# Patient Record
Sex: FEMALE | Race: WHITE | NOT HISPANIC OR LATINO | Employment: FULL TIME | URBAN - METROPOLITAN AREA
[De-identification: names, ages, dates, MRNs, and addresses within clinical notes are randomized per-mention and may not be internally consistent; named-entity substitution may affect disease eponyms.]

---

## 2017-09-21 ENCOUNTER — OFFICE VISIT (OUTPATIENT)
Dept: OBSTETRICS AND GYNECOLOGY | Facility: CLINIC | Age: 27
End: 2017-09-21
Attending: OBSTETRICS & GYNECOLOGY
Payer: COMMERCIAL

## 2017-09-21 VITALS
DIASTOLIC BLOOD PRESSURE: 80 MMHG | BODY MASS INDEX: 39.02 KG/M2 | SYSTOLIC BLOOD PRESSURE: 126 MMHG | HEIGHT: 61 IN | WEIGHT: 206.69 LBS

## 2017-09-21 DIAGNOSIS — Z12.4 ENCOUNTER FOR PAPANICOLAOU SMEAR FOR CERVICAL CANCER SCREENING: Primary | ICD-10-CM

## 2017-09-21 DIAGNOSIS — Z01.419 ENCOUNTER FOR GYNECOLOGICAL EXAMINATION (GENERAL) (ROUTINE) WITHOUT ABNORMAL FINDINGS: ICD-10-CM

## 2017-09-21 PROCEDURE — 88141 CYTOPATH C/V INTERPRET: CPT | Mod: ,,, | Performed by: PATHOLOGY

## 2017-09-21 PROCEDURE — 99999 PR PBB SHADOW E&M-EST. PATIENT-LVL III: CPT | Mod: PBBFAC,,, | Performed by: OBSTETRICS & GYNECOLOGY

## 2017-09-21 PROCEDURE — 88175 CYTOPATH C/V AUTO FLUID REDO: CPT | Performed by: PATHOLOGY

## 2017-09-21 PROCEDURE — 99395 PREV VISIT EST AGE 18-39: CPT | Mod: S$GLB,,, | Performed by: OBSTETRICS & GYNECOLOGY

## 2017-09-21 NOTE — PROGRESS NOTES
Subjective:       Patient ID: Rosa Limon is a 27 y.o. female.    Chief Complaint:  Annual Exam (last pap 2016 normal)      Patient Active Problem List   Diagnosis    Cerebral venous thrombosis    HA (headache)    Headache    Dural sinus thrombosis    Cephalalgia    Migraine aura, persistent, intractable, with status migrainosus    Major depressive disorder, recurrent episode, unspecified    Protracted URI    Encounter for therapeutic drug monitoring    Abnormal albumin    Cerebral venous sinus thrombosis    Long term (current) use of anticoagulants    Factor II deficiency       History of Present Illness  27 y.o. yo  here for annual exam. No gyn complaints. Doing well. No periods with Mirena. Previous CVA on Nuvaring, dx with Factor II deficiency. Mirena placed in       Past Medical History:   Diagnosis Date    Abnormal Pap smear of cervix 2015    (+) Low Grade Squamous Intraepithelial Lesion     Depression     Factor II deficiency     CVA on Nuvaring    Insomnia     Stroke     CVA on Nuvaring. Factor II deficiency        Past Surgical History:   Procedure Laterality Date    FRACTURE SURGERY      ORIF wrist left Left 2015    TONSILLECTOMY         OB History    Para Term  AB Living   0 0 0 0 0 0   SAB TAB Ectopic Multiple Live Births   0 0 0 0               No LMP recorded. Patient is not currently having periods (Reason: Birth Control).   Date of Last Pap: 2016    Review of Systems  Review of Systems   Constitutional: Negative for fatigue and unexpected weight change.   Respiratory: Negative for shortness of breath.    Cardiovascular: Negative for chest pain.   Gastrointestinal: Negative for abdominal pain, constipation, diarrhea, nausea and vomiting.   Genitourinary: Negative for dysuria.   Musculoskeletal: Negative for back pain.   Skin: Negative for rash.   Neurological: Negative for headaches.   Hematological: Does not bruise/bleed  easily.   Psychiatric/Behavioral: Negative for behavioral problems.        Objective:   Physical Exam:   Constitutional: She is oriented to person, place, and time. Vital signs are normal. She appears well-developed and well-nourished. No distress.        Pulmonary/Chest: She exhibits no mass. Right breast exhibits no mass, no nipple discharge, no skin change, no tenderness, no bleeding and no swelling. Left breast exhibits no mass, no nipple discharge, no skin change, no tenderness, no bleeding and no swelling. Breasts are symmetrical.        Abdominal: Soft. Normal appearance and bowel sounds are normal. She exhibits no distension and no mass. There is no tenderness. There is no rebound.     Genitourinary: Vagina normal and uterus normal. There is no rash, tenderness, lesion or injury on the right labia. There is no rash, tenderness, lesion or injury on the left labia. Uterus is not deviated, not enlarged, not fixed, not tender, not hosting fibroids and not experiencing uterine prolapse. Cervix is normal. Right adnexum displays no mass, no tenderness and no fullness. Left adnexum displays no mass, no tenderness and no fullness. No erythema, tenderness, rectocele, cystocele or unspecified prolapse of vaginal walls in the vagina. No vaginal discharge found. Cervix exhibits no motion tenderness, no discharge and no friability.           Musculoskeletal: Normal range of motion and moves all extremeties.      Lymphadenopathy:     She has no axillary adenopathy.        Right: No supraclavicular adenopathy present.        Left: No supraclavicular adenopathy present.    Neurological: She is alert and oriented to person, place, and time.    Skin: Skin is warm and dry.    Psychiatric: She has a normal mood and affect. Her behavior is normal. Judgment normal.        Assessment/ Plan:     1. Encounter for Papanicolaou smear for cervical cancer screening  Liquid-based pap smear, screening   2. Encounter for gynecological  examination (general) (routine) without abnormal findings       Strings not seen on exam, unofficial U/S done which confirmed Mirena is in place in uterus.   Follow-up with me in 1 year

## 2017-09-29 ENCOUNTER — TELEPHONE (OUTPATIENT)
Dept: OBSTETRICS AND GYNECOLOGY | Facility: CLINIC | Age: 27
End: 2017-09-29

## 2017-09-29 NOTE — TELEPHONE ENCOUNTER
----- Message from Radha Nino MD sent at 9/29/2017  2:28 PM CDT -----  I called pt. She did not answer. I left msg-- detailed, explaining results, and sent portal msg that she needs colpo. Call pt to schedule colpo

## 2017-10-03 ENCOUNTER — LAB VISIT (OUTPATIENT)
Dept: LAB | Facility: HOSPITAL | Age: 27
End: 2017-10-03
Attending: INTERNAL MEDICINE
Payer: COMMERCIAL

## 2017-10-03 ENCOUNTER — OFFICE VISIT (OUTPATIENT)
Dept: INTERNAL MEDICINE | Facility: CLINIC | Age: 27
End: 2017-10-03
Payer: COMMERCIAL

## 2017-10-03 VITALS
SYSTOLIC BLOOD PRESSURE: 126 MMHG | WEIGHT: 205.5 LBS | DIASTOLIC BLOOD PRESSURE: 98 MMHG | HEART RATE: 120 BPM | HEIGHT: 61 IN | BODY MASS INDEX: 38.8 KG/M2

## 2017-10-03 DIAGNOSIS — M79.661 BILATERAL CALF PAIN: Primary | ICD-10-CM

## 2017-10-03 DIAGNOSIS — M79.661 BILATERAL CALF PAIN: ICD-10-CM

## 2017-10-03 DIAGNOSIS — D68.52 PROTHROMBIN G20210A MUTATION: ICD-10-CM

## 2017-10-03 DIAGNOSIS — E55.9 VITAMIN D DEFICIENCY: ICD-10-CM

## 2017-10-03 DIAGNOSIS — M79.662 BILATERAL CALF PAIN: ICD-10-CM

## 2017-10-03 DIAGNOSIS — M79.662 BILATERAL CALF PAIN: Primary | ICD-10-CM

## 2017-10-03 LAB
25(OH)D3+25(OH)D2 SERPL-MCNC: 22 NG/ML
ALBUMIN SERPL BCP-MCNC: 3.5 G/DL
ALP SERPL-CCNC: 122 U/L
ALT SERPL W/O P-5'-P-CCNC: 19 U/L
ANION GAP SERPL CALC-SCNC: 10 MMOL/L
AST SERPL-CCNC: 17 U/L
BASOPHILS # BLD AUTO: 0.05 K/UL
BASOPHILS NFR BLD: 0.5 %
BILIRUB SERPL-MCNC: 0.7 MG/DL
BUN SERPL-MCNC: 9 MG/DL
CALCIUM SERPL-MCNC: 9.3 MG/DL
CHLORIDE SERPL-SCNC: 104 MMOL/L
CO2 SERPL-SCNC: 22 MMOL/L
CREAT SERPL-MCNC: 0.7 MG/DL
DIFFERENTIAL METHOD: ABNORMAL
EOSINOPHIL # BLD AUTO: 0.2 K/UL
EOSINOPHIL NFR BLD: 1.7 %
ERYTHROCYTE [DISTWIDTH] IN BLOOD BY AUTOMATED COUNT: 13.9 %
EST. GFR  (AFRICAN AMERICAN): >60 ML/MIN/1.73 M^2
EST. GFR  (NON AFRICAN AMERICAN): >60 ML/MIN/1.73 M^2
GLUCOSE SERPL-MCNC: 92 MG/DL
HCT VFR BLD AUTO: 42.1 %
HGB BLD-MCNC: 14 G/DL
INR PPP: 0.8
LYMPHOCYTES # BLD AUTO: 3 K/UL
LYMPHOCYTES NFR BLD: 30.1 %
MAGNESIUM SERPL-MCNC: 2.2 MG/DL
MCH RBC QN AUTO: 28.7 PG
MCHC RBC AUTO-ENTMCNC: 33.3 G/DL
MCV RBC AUTO: 86 FL
MONOCYTES # BLD AUTO: 0.7 K/UL
MONOCYTES NFR BLD: 7 %
NEUTROPHILS # BLD AUTO: 6 K/UL
NEUTROPHILS NFR BLD: 60.4 %
PLATELET # BLD AUTO: 370 K/UL
PMV BLD AUTO: 9.4 FL
POTASSIUM SERPL-SCNC: 4.1 MMOL/L
PROT SERPL-MCNC: 7.5 G/DL
PROTHROMBIN TIME: 9.4 SEC
RBC # BLD AUTO: 4.88 M/UL
SODIUM SERPL-SCNC: 136 MMOL/L
TSH SERPL DL<=0.005 MIU/L-ACNC: 1.14 UIU/ML
WBC # BLD AUTO: 9.93 K/UL

## 2017-10-03 PROCEDURE — 99214 OFFICE O/P EST MOD 30 MIN: CPT | Mod: S$GLB,,, | Performed by: INTERNAL MEDICINE

## 2017-10-03 PROCEDURE — 85025 COMPLETE CBC W/AUTO DIFF WBC: CPT

## 2017-10-03 PROCEDURE — 80053 COMPREHEN METABOLIC PANEL: CPT

## 2017-10-03 PROCEDURE — 84443 ASSAY THYROID STIM HORMONE: CPT

## 2017-10-03 PROCEDURE — 36415 COLL VENOUS BLD VENIPUNCTURE: CPT

## 2017-10-03 PROCEDURE — 99999 PR PBB SHADOW E&M-EST. PATIENT-LVL III: CPT | Mod: PBBFAC,,, | Performed by: INTERNAL MEDICINE

## 2017-10-03 PROCEDURE — 83735 ASSAY OF MAGNESIUM: CPT

## 2017-10-03 PROCEDURE — 82306 VITAMIN D 25 HYDROXY: CPT

## 2017-10-03 PROCEDURE — 85610 PROTHROMBIN TIME: CPT

## 2017-10-03 NOTE — PROGRESS NOTES
"Subjective:       Patient ID: Rosa Limon is a 27 y.o. female.    Chief Complaint: Leg Pain (Calves)    HPI   27-year-old female previously seen by me in 2015 with history of sagittal sinus thrombosis found to have prothrombin mutation anticoagulated on Coumadin (May-2015-Jan 2016, completed course per hematology) with presents for urgent eval of leg pain.    Walking causes her calves to cramp. Tried drinking lots of water and MVI without relief. Whole calf bilaterally cramps with short distances (often in store.)     Previous PCP Dr. Sammy Patino. Hasn't been seeing anyone for primary care.   wellbutrin  Duloxetine  Vit d  mirena      Review of Systems   Constitutional: Negative for fever.   Respiratory: Negative for shortness of breath.    Cardiovascular: Negative for chest pain.        Fast heart rate currently when nervous, reports has been normal at home   Musculoskeletal:        Calf pain with walking.      Skin: Negative.        Objective:   BP (!) 126/98 (BP Location: Right arm, Patient Position: Sitting, BP Method: Medium (Manual))   Pulse (!) 120   Ht 5' 1" (1.549 m)   Wt 93.2 kg (205 lb 7.5 oz)   BMI 38.82 kg/m²      Physical Exam   Constitutional: She is oriented to person, place, and time. She appears well-developed and well-nourished. No distress.   HENT:   Head: Normocephalic and atraumatic.   Cardiovascular: Normal rate and regular rhythm.    Pulmonary/Chest: Effort normal. No respiratory distress. She has no wheezes. She has no rales.   Musculoskeletal:   Somewhat tight musculature of calves bilaterally.  No asymmetry appreciated.  Palpable pulses bilateral lower extremities.  Left foot has several healing scars she states are mosquito bites   Neurological: She is alert and oriented to person, place, and time.   Skin: Skin is warm and dry. She is not diaphoretic.   Psychiatric: She has a normal mood and affect. Her behavior is normal.       Assessment:       1. Bilateral calf pain  "   2. 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 Vitamin D deficiency        Plan:       Rosa was seen today for leg pain.    Diagnoses and all orders for this visit:    Bilateral calf pain with exertion  Prothrombin [id number] mutation  -     Cardiology Lab Ankle Brachial Indices W Stress; Future  -     US Lower Extremity Veins Bilateral; Future  -     CBC auto differential; Future  -     Comprehensive metabolic panel; Future  -     TSH; Future  -     Magnesium; Future  -     Protime-INR; Future    Vitamin D deficiency  -     Vitamin D; Future   Taking 50 k weekly sporadically, rx by psyc    Tachycardia  Reports is nervous, has been normal range at home

## 2017-10-06 ENCOUNTER — HOSPITAL ENCOUNTER (OUTPATIENT)
Dept: RADIOLOGY | Facility: HOSPITAL | Age: 27
Discharge: HOME | End: 2017-10-06
Attending: INTERNAL MEDICINE
Payer: COMMERCIAL

## 2017-10-06 DIAGNOSIS — M79.661 BILATERAL CALF PAIN: ICD-10-CM

## 2017-10-06 DIAGNOSIS — M79.662 BILATERAL CALF PAIN: ICD-10-CM

## 2017-10-06 DIAGNOSIS — D68.52 PROTHROMBIN G20210A MUTATION: ICD-10-CM

## 2017-10-06 PROCEDURE — 93970 EXTREMITY STUDY: CPT | Mod: TC

## 2017-10-06 PROCEDURE — 93970 EXTREMITY STUDY: CPT | Mod: 26,,, | Performed by: RADIOLOGY

## 2017-10-09 ENCOUNTER — CLINICAL SUPPORT (OUTPATIENT)
Dept: CARDIOLOGY | Facility: CLINIC | Age: 27
End: 2017-10-09
Payer: COMMERCIAL

## 2017-10-09 DIAGNOSIS — M79.662 BILATERAL CALF PAIN: ICD-10-CM

## 2017-10-09 DIAGNOSIS — D68.52 PROTHROMBIN G20210A MUTATION: ICD-10-CM

## 2017-10-09 DIAGNOSIS — M79.661 BILATERAL CALF PAIN: ICD-10-CM

## 2017-10-09 LAB — VASCULAR ANKLE BRACHIAL INDEX (ABI) RIGHT: 1.1 (ref 0.9–1.2)

## 2017-10-09 PROCEDURE — 93924 LWR XTR VASC STDY BILAT: CPT | Mod: S$GLB,,, | Performed by: INTERNAL MEDICINE

## 2017-10-12 ENCOUNTER — PROCEDURE VISIT (OUTPATIENT)
Dept: OBSTETRICS AND GYNECOLOGY | Facility: CLINIC | Age: 27
End: 2017-10-12
Attending: OBSTETRICS & GYNECOLOGY
Payer: COMMERCIAL

## 2017-10-12 VITALS
SYSTOLIC BLOOD PRESSURE: 110 MMHG | HEIGHT: 61 IN | DIASTOLIC BLOOD PRESSURE: 64 MMHG | WEIGHT: 202.5 LBS | BODY MASS INDEX: 38.23 KG/M2

## 2017-10-12 DIAGNOSIS — R87.611 PAP SMEAR OF CERVIX WITH ASCUS, CANNOT EXCLUDE HGSIL: Primary | ICD-10-CM

## 2017-10-12 PROCEDURE — 88305 TISSUE EXAM BY PATHOLOGIST: CPT | Performed by: PATHOLOGY

## 2017-10-12 PROCEDURE — 88305 TISSUE EXAM BY PATHOLOGIST: CPT | Mod: 26,,, | Performed by: PATHOLOGY

## 2017-10-12 PROCEDURE — 57454 BX/CURETT OF CERVIX W/SCOPE: CPT | Mod: S$GLB,,, | Performed by: OBSTETRICS & GYNECOLOGY

## 2017-10-12 NOTE — PROCEDURES
"Procedures     COLPOSCOPY:    Rosa Limon is a 27 y.o. female   presents for colposcopy.  No LMP recorded (lmp unknown). Patient is not currently having periods (Reason: Birth Control)..  Her most recent pap smear shows ASC cannot exclude high grade lesion (ASCH).      The abnormal test results were discussed.  We also discussed HPV infection and its association with abnormal Pap tests and cervical cancer.  The risks and benefits of colposcopy were reviewed.  She agrees to proceed.      UPT is negative    Vitals:    10/12/17 1107   BP: 110/64   Weight: 91.8 kg (202 lb 7.9 oz)   Height: 5' 1" (1.549 m)   PainSc: 0-No pain       COLPOSCOPY EXAM:   TIME OUT PERFORMED.     no visible lesions, no mosaicism, no punctation and no abnormal vasculature    Biopsy was taken at 12 o'clock. Even though no AWFL. I took one biopsy because of favor high grade. ECC was performed    Hemostasis was adequate with application of Monsel's solution.  The speculum was removed.  The patient did tolerate the procedure well.    All collected specimens sent to pathology for histologic analysis.    Pap smear of cervix with ASCUS, cannot exclude HGSIL  -     Tissue Specimen To Pathology, Obstetrics/Gynecology  -     Tissue Specimen To Pathology, Obstetrics/Gynecology        Post-colposcopy counseling:  For cramping take NSAIDs, Tylenol, or a prescription pain medication per the medication card.    Avoid intercourse, douching, or tampons in the vagina for at least 7 days.   Expect a clumpy brown, orange, yellow, or black discharge due to Monsel's solution application for several days.   Call the office for heavy bleeding, significant pain, or a  foul-smelling vaginal discharge.  The HPV vaccine was  recommended according to FDA age guidelines.  The importance of keeping follow-up appointments was discussed.    Final plan and follow up based on colposcopy results.    "

## 2017-10-17 DIAGNOSIS — R79.89 HIGH PLATELET COUNT: Primary | ICD-10-CM

## 2017-10-17 DIAGNOSIS — E55.9 VITAMIN D INSUFFICIENCY: ICD-10-CM

## 2017-10-23 ENCOUNTER — PATIENT MESSAGE (OUTPATIENT)
Dept: INTERNAL MEDICINE | Facility: CLINIC | Age: 27
End: 2017-10-23

## 2017-10-24 ENCOUNTER — PATIENT MESSAGE (OUTPATIENT)
Dept: INTERNAL MEDICINE | Facility: CLINIC | Age: 27
End: 2017-10-24

## 2017-10-24 DIAGNOSIS — D68.52 PROTHROMBIN G20210A MUTATION: ICD-10-CM

## 2017-10-24 DIAGNOSIS — D68.2 FACTOR II DEFICIENCY: Primary | ICD-10-CM

## 2017-10-24 DIAGNOSIS — R52 PAIN AGGRAVATED BY WALKING: ICD-10-CM

## 2017-11-01 ENCOUNTER — PATIENT MESSAGE (OUTPATIENT)
Dept: INTERNAL MEDICINE | Facility: CLINIC | Age: 27
End: 2017-11-01

## 2017-11-01 DIAGNOSIS — Z00.00 HEALTH CARE MAINTENANCE: Primary | ICD-10-CM

## 2017-11-03 ENCOUNTER — TELEPHONE (OUTPATIENT)
Dept: OBSTETRICS AND GYNECOLOGY | Facility: CLINIC | Age: 27
End: 2017-11-03

## 2017-11-16 ENCOUNTER — HOSPITAL ENCOUNTER (EMERGENCY)
Facility: HOSPITAL | Age: 27
Discharge: HOME | End: 2017-11-16
Attending: EMERGENCY MEDICINE
Payer: COMMERCIAL

## 2017-11-16 VITALS
HEIGHT: 61 IN | SYSTOLIC BLOOD PRESSURE: 135 MMHG | OXYGEN SATURATION: 100 % | DIASTOLIC BLOOD PRESSURE: 80 MMHG | TEMPERATURE: 99 F | RESPIRATION RATE: 20 BRPM | BODY MASS INDEX: 37.76 KG/M2 | HEART RATE: 75 BPM | WEIGHT: 200 LBS

## 2017-11-16 DIAGNOSIS — R45.851 SUICIDAL THOUGHTS: ICD-10-CM

## 2017-11-16 DIAGNOSIS — D68.2 FACTOR II DEFICIENCY: ICD-10-CM

## 2017-11-16 DIAGNOSIS — F32.A DEPRESSION, UNSPECIFIED DEPRESSION TYPE: Primary | ICD-10-CM

## 2017-11-16 DIAGNOSIS — G08 CEREBRAL VENOUS SINUS THROMBOSIS: ICD-10-CM

## 2017-11-16 LAB
ALBUMIN SERPL BCP-MCNC: 3.7 G/DL
ALP SERPL-CCNC: 120 U/L
ALT SERPL W/O P-5'-P-CCNC: 23 U/L
AMPHET+METHAMPHET UR QL: NEGATIVE
ANION GAP SERPL CALC-SCNC: 15 MMOL/L
APAP SERPL-MCNC: <3 UG/ML
AST SERPL-CCNC: 21 U/L
B-HCG UR QL: NEGATIVE
BARBITURATES UR QL SCN>200 NG/ML: NEGATIVE
BASOPHILS # BLD AUTO: 0.03 K/UL
BASOPHILS NFR BLD: 0.4 %
BENZODIAZ UR QL SCN>200 NG/ML: NEGATIVE
BILIRUB SERPL-MCNC: 0.2 MG/DL
BILIRUB UR QL STRIP: NEGATIVE
BUN SERPL-MCNC: 7 MG/DL
BZE UR QL SCN: NEGATIVE
CALCIUM SERPL-MCNC: 8.7 MG/DL
CANNABINOIDS UR QL SCN: NEGATIVE
CHLORIDE SERPL-SCNC: 110 MMOL/L
CLARITY UR: ABNORMAL
CO2 SERPL-SCNC: 17 MMOL/L
COLOR UR: YELLOW
CORTIS SERPL-MCNC: 53.1 UG/DL
CREAT SERPL-MCNC: 0.7 MG/DL
CREAT UR-MCNC: 145.9 MG/DL
CTP QC/QA: YES
DIFFERENTIAL METHOD: ABNORMAL
EOSINOPHIL # BLD AUTO: 0.1 K/UL
EOSINOPHIL NFR BLD: 0.7 %
ERYTHROCYTE [DISTWIDTH] IN BLOOD BY AUTOMATED COUNT: 13.6 %
EST. GFR  (AFRICAN AMERICAN): >60 ML/MIN/1.73 M^2
EST. GFR  (NON AFRICAN AMERICAN): >60 ML/MIN/1.73 M^2
ETHANOL SERPL-MCNC: 130 MG/DL
GLUCOSE SERPL-MCNC: 93 MG/DL
GLUCOSE UR QL STRIP: NEGATIVE
HCT VFR BLD AUTO: 42.8 %
HGB BLD-MCNC: 14.4 G/DL
HGB UR QL STRIP: NEGATIVE
KETONES UR QL STRIP: NEGATIVE
LEUKOCYTE ESTERASE UR QL STRIP: NEGATIVE
LYMPHOCYTES # BLD AUTO: 2.5 K/UL
LYMPHOCYTES NFR BLD: 33.8 %
MCH RBC QN AUTO: 29.5 PG
MCHC RBC AUTO-ENTMCNC: 33.6 G/DL
MCV RBC AUTO: 88 FL
METHADONE UR QL SCN>300 NG/ML: NEGATIVE
MONOCYTES # BLD AUTO: 0.3 K/UL
MONOCYTES NFR BLD: 4.1 %
NEUTROPHILS # BLD AUTO: 4.6 K/UL
NEUTROPHILS NFR BLD: 60.6 %
NITRITE UR QL STRIP: NEGATIVE
OPIATES UR QL SCN: NEGATIVE
PCP UR QL SCN>25 NG/ML: NEGATIVE
PH UR STRIP: 6 [PH] (ref 5–8)
PLATELET # BLD AUTO: 319 K/UL
PMV BLD AUTO: 9.1 FL
POTASSIUM SERPL-SCNC: 4.8 MMOL/L
PROT SERPL-MCNC: 7.9 G/DL
PROT UR QL STRIP: NEGATIVE
RBC # BLD AUTO: 4.88 M/UL
SODIUM SERPL-SCNC: 142 MMOL/L
SP GR UR STRIP: >=1.03 (ref 1–1.03)
TOXICOLOGY INFORMATION: NORMAL
TSH SERPL DL<=0.005 MIU/L-ACNC: 1.08 UIU/ML
URN SPEC COLLECT METH UR: ABNORMAL
UROBILINOGEN UR STRIP-ACNC: NEGATIVE EU/DL
WBC # BLD AUTO: 7.52 K/UL

## 2017-11-16 PROCEDURE — 81025 URINE PREGNANCY TEST: CPT

## 2017-11-16 PROCEDURE — 80307 DRUG TEST PRSMV CHEM ANLYZR: CPT

## 2017-11-16 PROCEDURE — 80329 ANALGESICS NON-OPIOID 1 OR 2: CPT

## 2017-11-16 PROCEDURE — 99285 EMERGENCY DEPT VISIT HI MDM: CPT | Mod: 25

## 2017-11-16 PROCEDURE — 85025 COMPLETE CBC W/AUTO DIFF WBC: CPT

## 2017-11-16 PROCEDURE — 82533 TOTAL CORTISOL: CPT

## 2017-11-16 PROCEDURE — 80053 COMPREHEN METABOLIC PANEL: CPT

## 2017-11-16 PROCEDURE — 84443 ASSAY THYROID STIM HORMONE: CPT

## 2017-11-16 PROCEDURE — 80320 DRUG SCREEN QUANTALCOHOLS: CPT

## 2017-11-16 PROCEDURE — 81003 URINALYSIS AUTO W/O SCOPE: CPT

## 2017-11-16 RX ORDER — CLONAZEPAM 0.5 MG/1
0.5 TABLET ORAL 3 TIMES DAILY PRN
Qty: 5 TABLET | Refills: 0 | Status: SHIPPED | OUTPATIENT
Start: 2017-11-16 | End: 2017-11-29

## 2017-11-16 RX ORDER — BUPROPION HYDROCHLORIDE 200 MG/1
400 TABLET, EXTENDED RELEASE ORAL DAILY
Qty: 60 TABLET | Refills: 0 | Status: SHIPPED | OUTPATIENT
Start: 2017-11-16 | End: 2017-12-16

## 2017-11-16 RX ORDER — DULOXETIN HYDROCHLORIDE 60 MG/1
60 CAPSULE, DELAYED RELEASE ORAL 2 TIMES DAILY
Qty: 60 CAPSULE | Refills: 0 | Status: SHIPPED | OUTPATIENT
Start: 2017-11-16 | End: 2018-01-03 | Stop reason: SDUPTHER

## 2017-11-16 NOTE — ED PROVIDER NOTES
Encounter Date: 11/16/2017       History     Chief Complaint   Patient presents with    Suicidal     told boyfriend over the phone that she wanted to kill herself with plans to hang herself     27-year-old female with insomnia, CVA, factor II deficiency and depression with previous hospitalization and suicide attempts presents to the ED with worsening depression for the past some time.  She states that she recently had a breakup with her boyfriend and that since this time has been experiencing more depression.  She states that she has dealt with depression for some time and that previous suicide attempt was 2 years ago.  She does state that she is followed by psychiatrist and had an appointment yesterday however did not relay her increased depression to her.  She does report suicide ideation and a plan.  Per EMS, patient called boyfriend and told him that she had a plan to hang herself.  She denies any attempts today or recently.  She does report compliance with Cymbalta and Wellbutrin.      The history is provided by the patient.     Review of patient's allergies indicates:  No Known Allergies  Past Medical History:   Diagnosis Date    Abnormal Pap smear of cervix 09/14/2015    (+) Low Grade Squamous Intraepithelial Lesion     ASCUS with positive high risk HPV cervical 09/21/2017    Depression     Factor II deficiency     CVA on Nuvaring    Insomnia     Stroke     CVA on Nuvaring. Factor II deficiency      Past Surgical History:   Procedure Laterality Date    COLPOSCOPY  10/12/2017    FRACTURE SURGERY      ORIF wrist left Left 5/1/2015    TONSILLECTOMY       Family History   Problem Relation Age of Onset    Hypertension Maternal Grandmother     Diabetes Maternal Grandfather     Diabetes Paternal Uncle     Cancer Neg Hx     Heart disease Neg Hx     Stroke Neg Hx      Social History   Substance Use Topics    Smoking status: Never Smoker    Smokeless tobacco: Never Used    Alcohol use Yes       Comment: social     Review of Systems   Constitutional: Negative for activity change, appetite change, chills and fever.   HENT: Negative for congestion and sore throat.    Respiratory: Negative for cough and shortness of breath.    Cardiovascular: Negative for chest pain.   Gastrointestinal: Negative for nausea and vomiting.   Genitourinary: Negative for dysuria.   Musculoskeletal: Negative for back pain.   Skin: Negative for rash.   Neurological: Negative for dizziness, weakness, light-headedness and headaches.   Hematological: Does not bruise/bleed easily.   Psychiatric/Behavioral: Positive for dysphoric mood and suicidal ideas. Negative for self-injury.       Physical Exam     Initial Vitals [11/16/17 0940]   BP Pulse Resp Temp SpO2   128/86 (!) 120 (!) 22 98.7 °F (37.1 °C) 100 %      MAP       100         Physical Exam    Nursing note and vitals reviewed.  Constitutional: Vital signs are normal. She appears well-developed and well-nourished. She is cooperative.  Non-toxic appearance. She does not appear ill. No distress.   HENT:   Head: Normocephalic and atraumatic.   Eyes: Conjunctivae and lids are normal.   Neck: Neck supple. No neck rigidity.   Cardiovascular: Normal rate and regular rhythm.   Pulmonary/Chest: Breath sounds normal. No respiratory distress. She has no wheezes. She has no rhonchi.   Abdominal: Soft. Normal appearance and bowel sounds are normal. There is no tenderness. There is no rigidity and no guarding.   Musculoskeletal: Normal range of motion.   Neurological: She is alert and oriented to person, place, and time. She has normal strength. GCS eye subscore is 4. GCS verbal subscore is 5. GCS motor subscore is 6.   Skin: Skin is warm, dry and intact. No rash noted.   Psychiatric: Her speech is normal and behavior is normal. She exhibits a depressed mood. She expresses suicidal ideation. She expresses no homicidal ideation. She expresses suicidal plans.         ED Course   Procedures  Labs  Reviewed   CBC W/ AUTO DIFFERENTIAL   COMPREHENSIVE METABOLIC PANEL   TSH   URINALYSIS   DRUG SCREEN PANEL, URINE EMERGENCY   ALCOHOL,MEDICAL (ETHANOL)   ACETAMINOPHEN LEVEL   POCT URINE PREGNANCY             Medical Decision Making:   Initial Assessment:   19-year-old female with history of depression currently followed by psychiatrists and on Wellbutrin and Cymbalta presents to the ED with suicidal ideation and plan of suicide.  She does work as a  and states that she has a good support system.  That she has been more depressed since a recent breakup.  Per EMS, she did call her boyfriend and informed him that she had a plan.  She denies any attempt today or recently.  Physical exam reveals tearful patient expresses hopelessness, suicidal ideation and plan.  She does exhibit insight and does have a good support system and understanding of depression as she herself is a .  She denies any HI.  She does not appear to have any delusions or hallucinations or lesions.  No other significant findings at this time  Differential Diagnosis:   Suicidal ideation, plan of suicide, depression, UTI, acute intracranial abnormality  Clinical Tests:   Lab Tests: Ordered and Reviewed  Radiological Study: Ordered and Reviewed  Medical Tests: Ordered and Reviewed  ED Management:  Initial assessment by myself.  PEC was ordered until evaluation by psychiatrist.  The psychiatrist evaluate the patient in the emergency room and feels that a PEC is not necessary at this time and the patient is not a threat to herself at this time.  Recommendations are that patient should be discharged after cleared from a medical standpoint.  He will send patient home with increased Wellbutrin and Cymbalta.  Patient will be sent home was short course of Klonopin and instruct the patient to follow up with psychiatrist tomorrow. Patient was cautioned on when to return to ED. Patient verbalized understanding and agreement with the  treatment plan                     ED Course      Clinical Impression:   The primary encounter diagnosis was Depression, unspecified depression type. Diagnoses of Factor II deficiency, Suicidal thoughts, and Cerebral venous sinus thrombosis were also pertinent to this visit.                           SHERRY Hamilton  11/16/17 7634

## 2017-11-16 NOTE — CONSULTS
Discharge this patient to [facility] when medically stable  Increase Cymbalta to 60 mg po bid.  Increase Wellbutrin 200 mg po bid.  Klonopin 0.5 mg po prn q 12

## 2017-11-16 NOTE — ED NOTES
APPEARANCE: Alert, oriented and in no acute distress.  PERIPHERAL VASCULAR: peripheral pulses present. Normal cap refill. No edema. Warm to touch.    RESPIRATORY:Normal rate and effort, breath sounds clear bilaterally throughout chest. Respirations are equal and unlabored no obvious signs of distress.  GASTRO: soft, bowel sounds normal, no tenderness, no abdominal distention.  MUSC: Full ROM. No bony tenderness or soft tissue tenderness. No obvious deformity.  SKIN: Skin is warm and dry, normal skin turgor, mucous membranes moist.  EYE: PERRL, both eyes: pupils brisk and reactive to light. Normal size.  ENT: EARS: no obvious drainage. NOSE: no active bleeding.

## 2017-11-17 NOTE — PSYCH
"IDENTIFICATION DATA:  This is a 27-year-old single white female who was brought   in by EMS at the request of ex-boyfriend.  This consult is requested by Dr. Lane for psych evaluation.  The patient is not on a PEC status.    CHIEF COMPLAINT:  "My ex-boyfriend called because I was making suicidal   statements."    HISTORY OF PRESENT ILLNESS:  The patient states that she broke up with her   boyfriend last month and she started talking with another new boyfriend who   found her depressed and states that her depression is too much and she needs   help.  The patient states that she has been depressed since teenage.  She states   that she was seen by psychologist when she was 13.  Her depression was off and   on.  She has noted increase in depression for the last month.  She is depressed,   sad, tearful and getting suicidal thoughts for the last two days.  She broke up   with new boyfriend today, and she told that she wanted to kill herself.  She   had no plans.  The patient has a strong family history of depression.  The   patient's mom, brother, dad are all on antidepressants, and they have depression    The patient states that she is compliant with her Cymbalta and Wellbutrin   combination which works.  She was on Zoloft, Paxil, and Prozac in the past.  She   was drinking a bottle full of wine every day, but she quit in April.  She drank   a bottle of wine last night.  She does not get withdrawals.  She was clean   since April.  The patient is now focusing at work and she is distracted at work.    She denies flashbacks, nightmares or obsessive-compulsive features or social   phobia.  She picks on her skin.  The patient does not believe that she gets   nervous easily.  She denies any major panic attacks; however, she stays anxious   and she believes that her depression and anxiety go side by side.  She has no   phobias.  She denies use of drugs.  She is happy with her current job.  She has   some financial strains.  " She gets along well with supportive family.  She broke   up with boyfriend today.  She knows that depression affects her relationship.    She was diagnosed with  sinus and had two clots in her brain.  She has no   seizures.  She is consistent with her Cymbalta and Wellbutrin combination.  She   is not on any benzos and nothing for anxiety.  She still has sores over her face   and legs.  She denies hearing voices or seeing things.    PAST PSYCHIATRIC HISTORY:  The patient has no previous inpatient psychiatric   treatment.  She has been seen by a psychologist and psychiatrist.  She is under   the care of   who prescribed Cymbalta 90 mg p.o. per day and Wellbutrin   300 mg per day.  She was on Prozac, Paxil and Zoloft in the past.  She was tried   on Zoloft 150 and it was changed.  She has no history of suicide attempt, but   had suicidal thoughts in the past.  The patient has no history of arrest, DWIs   or rehabilitation.    SOCIAL AND FAMILY HISTORY:  The patient was born and raised in [place].  She   described her childhood as good.  She was raised by her parents.  She is a    by profession.  She has a good job.  She lives by herself.  She   has supportive family.  Her brother, dad, and mom suffer from depression, but no   suicide, psychosis and addiction in family.    MEDICAL HISTORY:  The patient has a history of VSD.  She is not on any medicine.    She has no history of seizures.  She had a stroke two years ago.  She is on   Cymbalta and Wellbutrin combination.  She is not allergic to any medicine or   food.  She has no endocrine workup in the past in spite of her cushingoid face.    Her blood pressure is 128/86, with 120 pulse and normal temperature.    MENTAL STATUS EXAMINATION:  This is a 27-year-old, short-statured, obese with   cushingoid face and with very good eye contact.  She is alert, cooperative and   oriented to day, date, month and year.  Mood is depressed with sad affect.  She    has tearful eyes.  She has sores on her leg and few on neck.  She is attentive   and able to concentrate.  She never had manic episode before.  She is able to recall 3 objects   out of 3 immediately, 3 out of 3 after 5 minutes and events of the past.  She   denies hearing voices or seeing things.  No delusional content noted.  She has   thoughts of harm to self and had passive suicidal statement like she wanted to   kill herself with no plans.  She has no thoughts of harm to self at this time   and has no plans.  She is willing to increase her medicines.  Insight and   judgment are better.  The patient knows her impulsive statement because she was   hurting this morning.  She has good fund of knowledge.  She had impaired   judgment this morning when she thought that she has no hope.  The patient is now   saying that she has good job and has supportive family and she would go to her   parents.  The patient has good abstract thinking.  She does not want to go to   the hospital because it may affect her career.  She is of average intelligence   person.    PSYCHIATRIC DIAGNOSES:  AXIS I:  Major depressive disorder, recurrent without psychotic features;   anxiety disorder NOS; adjustment disorder with depressed mood and anxiety.  AXIS II:  No diagnosis.  AXIS III:  Cerebrovascular accident with no residual deficit.  AXIS IV:  Broke up with boyfriend, chronic mental illness, partial response to   medication, poor coping skills.  AXIS V:  35.    RECOMMENDATIONS:  1.  We will check the patient's cortisone, thyroid function test and CT to rule   out medical causes.  2.  We will increase the patient's Wellbutrin SR to 200 mg in two divided doses   and Cymbalta 60 mg p.o. b.i.d.  We will use Klonopin 0.5 mg as a p.r.n. q. 6   hourly.  The patient has supportive family and family is comfortable taking her   home.  The patient is comfortable living with family as things are better.  The   patient has an appointment.  The  patient saw her psychiatrist just recently.    The patient will also continue to follow up with previously treating MD for medications.  Poor   coping skills discussed and case discussed with the patient's family.  The   patient could be discharged back to home as the patient has supportive family   and has no intention to kill herself and worried about her future with inpatient   treatment.  The patient has regrets about her statement and wanted to go back   to her mom's home.    ASSETS:  The patient is verbal, employed, compliant with medicine, and has   supportive family at home.            /felix [id number] eugenia(s)        AI/HN  dd: 11/16/2017 10:58:51 (CST)  td: 11/16/2017 21:53:46 (CST)  Doc ID   #[id number]  Job ID #[id number]    CC:

## 2017-11-22 ENCOUNTER — PATIENT MESSAGE (OUTPATIENT)
Dept: INTERNAL MEDICINE | Facility: CLINIC | Age: 27
End: 2017-11-22

## 2017-11-22 DIAGNOSIS — R79.89 ELEVATED CORTISOL LEVEL: Primary | ICD-10-CM

## 2017-11-27 ENCOUNTER — PATIENT MESSAGE (OUTPATIENT)
Dept: INTERNAL MEDICINE | Facility: CLINIC | Age: 27
End: 2017-11-27

## 2017-11-27 NOTE — TELEPHONE ENCOUNTER
Good morning Dr. John,    I went to [facility] ER on 11/16 for SI, and Dr. Romo, the psychiatrist, suggested I may have Cushing's syndrome. He ordered to have my cortisol level tested and the result came back 53.1 ug/dL. Thoughts? Let me know if you need more info.    Thanks,   Rosa Limon

## 2017-11-29 ENCOUNTER — PROCEDURE VISIT (OUTPATIENT)
Dept: OBSTETRICS AND GYNECOLOGY | Facility: CLINIC | Age: 27
End: 2017-11-29
Attending: OBSTETRICS & GYNECOLOGY
Payer: COMMERCIAL

## 2017-11-29 VITALS
DIASTOLIC BLOOD PRESSURE: 70 MMHG | WEIGHT: 195.69 LBS | BODY MASS INDEX: 36.94 KG/M2 | HEIGHT: 61 IN | SYSTOLIC BLOOD PRESSURE: 106 MMHG

## 2017-11-29 DIAGNOSIS — R87.810 ASCUS WITH POSITIVE HIGH RISK HPV CERVICAL: Primary | ICD-10-CM

## 2017-11-29 DIAGNOSIS — R87.610 ASCUS WITH POSITIVE HIGH RISK HPV CERVICAL: Primary | ICD-10-CM

## 2017-11-29 PROCEDURE — 88305 TISSUE EXAM BY PATHOLOGIST: CPT | Mod: 26,,, | Performed by: PATHOLOGY

## 2017-11-29 PROCEDURE — 57461 CONZ OF CERVIX W/SCOPE LEEP: CPT | Mod: S$GLB,,, | Performed by: OBSTETRICS & GYNECOLOGY

## 2017-11-29 PROCEDURE — 88307 TISSUE EXAM BY PATHOLOGIST: CPT | Mod: 26,,, | Performed by: PATHOLOGY

## 2017-11-29 PROCEDURE — 88307 TISSUE EXAM BY PATHOLOGIST: CPT | Performed by: PATHOLOGY

## 2017-11-29 PROCEDURE — 88305 TISSUE EXAM BY PATHOLOGIST: CPT | Performed by: PATHOLOGY

## 2017-11-29 RX ORDER — BUPROPION HYDROCHLORIDE 300 MG/1
450 TABLET ORAL DAILY
COMMUNITY
Start: 2017-11-06 | End: 2021-10-22

## 2017-11-30 NOTE — PROCEDURES
Procedures     LEEP in office procedure note:    Written consent obtained. The procedure was explained in detail. All questions answered.     The patient's pap smear was ASCUS favor high grade  The colposcopy biopsy was moderate dysplasia    Procedure in detail:    The patient was placed in lithotomy position. A protected coated speculum was placed without difficulty. Lugol's solution was placed on the cervix and there were no non-staining portions of the cervix.     Approximately 10 cc of 1% lidocaine without epinephrine was placed at the cervix at 12,2,4,6,8, and 10 o'clock. Patient tolerated well. No unusual side effects like ringing in the ear or metallic taste in mouth.    The Loop device was then hooked up to cautery and set at 70 cut. One pass was made over the cervix without difficulty. ECC was performed. The rollerball was used at 30 coag setting to cauterize the bed of the cervix.    There was an arterial vessel that was bleeding at   12 o'clock position. Patient had more bleeding than average. Approximately 100 cc total from whole procedure. Cauterized multiple times. 5 more cc of 1% lidocaine was placed for more comfort. Still had bleeding despite rollerball. Therefore one figure of eight suture of 2-0 chromic was placed at the 12 o'clock position which slowed the bleeding. Monsel's was placed and pressure was held. She had no active bleeding. After monitoring for a few minutes.     The patient tolerated the procedure well.   The speculum was removed from the vagina.     I had the patient sit in a room, clothed and dressed for 30 minutes to make sure she did not continue to bleed. After 30 minutes the patient went to the bathroom and had minimal bleeding therefore I did not examine her again.      The specimen was sent to pathology. I will call the patient with the results.     Instructed to go to ER if heavy vaginal bleeding. Pt aware. Answered all questions.     Follow up in 1 month for post op visit.    All questions were answered  Post-op directions explained.

## 2017-12-05 ENCOUNTER — PATIENT MESSAGE (OUTPATIENT)
Dept: OBSTETRICS AND GYNECOLOGY | Facility: CLINIC | Age: 27
End: 2017-12-05

## 2017-12-06 ENCOUNTER — LAB VISIT (OUTPATIENT)
Dept: LAB | Facility: HOSPITAL | Age: 27
End: 2017-12-06
Attending: INTERNAL MEDICINE
Payer: COMMERCIAL

## 2017-12-06 DIAGNOSIS — R79.89 ELEVATED CORTISOL LEVEL: ICD-10-CM

## 2017-12-06 LAB — CORTIS SERPL-MCNC: 13.8 UG/DL

## 2017-12-06 PROCEDURE — 82533 TOTAL CORTISOL: CPT

## 2017-12-06 PROCEDURE — 36415 COLL VENOUS BLD VENIPUNCTURE: CPT

## 2018-01-03 ENCOUNTER — OFFICE VISIT (OUTPATIENT)
Dept: INTERNAL MEDICINE | Facility: CLINIC | Age: 28
End: 2018-01-03
Payer: COMMERCIAL

## 2018-01-03 VITALS
DIASTOLIC BLOOD PRESSURE: 88 MMHG | HEIGHT: 61 IN | SYSTOLIC BLOOD PRESSURE: 117 MMHG | WEIGHT: 191 LBS | OXYGEN SATURATION: 98 % | BODY MASS INDEX: 36.06 KG/M2 | HEART RATE: 105 BPM

## 2018-01-03 DIAGNOSIS — D68.52 PROTHROMBIN G20210A MUTATION: ICD-10-CM

## 2018-01-03 DIAGNOSIS — R00.0 TACHYCARDIA: ICD-10-CM

## 2018-01-03 DIAGNOSIS — F33.0 MILD EPISODE OF RECURRENT MAJOR DEPRESSIVE DISORDER: Primary | ICD-10-CM

## 2018-01-03 DIAGNOSIS — Z23 NEED FOR TDAP VACCINATION: ICD-10-CM

## 2018-01-03 DIAGNOSIS — G08 CEREBRAL VENOUS SINUS THROMBOSIS: ICD-10-CM

## 2018-01-03 PROCEDURE — 90715 TDAP VACCINE 7 YRS/> IM: CPT | Mod: S$GLB,,, | Performed by: INTERNAL MEDICINE

## 2018-01-03 PROCEDURE — 93005 ELECTROCARDIOGRAM TRACING: CPT | Mod: S$GLB,,, | Performed by: INTERNAL MEDICINE

## 2018-01-03 PROCEDURE — 93010 ELECTROCARDIOGRAM REPORT: CPT | Mod: S$GLB,,, | Performed by: INTERNAL MEDICINE

## 2018-01-03 PROCEDURE — 90471 IMMUNIZATION ADMIN: CPT | Mod: S$GLB,,, | Performed by: INTERNAL MEDICINE

## 2018-01-03 PROCEDURE — 99214 OFFICE O/P EST MOD 30 MIN: CPT | Mod: 25,S$GLB,, | Performed by: INTERNAL MEDICINE

## 2018-01-03 PROCEDURE — 99999 PR PBB SHADOW E&M-EST. PATIENT-LVL III: CPT | Mod: PBBFAC,,, | Performed by: INTERNAL MEDICINE

## 2018-01-03 RX ORDER — BUPROPION HYDROCHLORIDE 150 MG/1
150 TABLET ORAL EVERY MORNING
Refills: 0 | COMMUNITY
Start: 2017-12-30 | End: 2021-10-22

## 2018-01-03 RX ORDER — DULOXETIN HYDROCHLORIDE 60 MG/1
CAPSULE, DELAYED RELEASE ORAL
Qty: 60 CAPSULE | Refills: 0 | COMMUNITY
Start: 2018-01-03 | End: 2020-07-15

## 2018-01-03 RX ORDER — TRAZODONE HYDROCHLORIDE 50 MG/1
50 TABLET ORAL NIGHTLY
Refills: 0 | COMMUNITY
Start: 2017-12-30 | End: 2020-07-15

## 2018-01-03 NOTE — PROGRESS NOTES
"Subjective:       Patient ID: Rosa Limon is a 27 y.o. female.    Chief Complaint: Follow-up (3 mo follow up.)    HPI 28 yo F with hx of sagittal sinus thrombosis found to have prothrombin mutation anticoagulated on Coumadin (May-2015-Jan 2016, completed course per hematology) presents for follow up of this.   Was previously having calf cramps with walking.   ADRIANE stress - nl except drop after exercise  US veins - neg DVT  Feels fine walking on treadmill.    Seen in ED 12/15/17. Increased wellbutrin 450 xr and cymbalta 90.   Psychiatrist Dr. Moni Dang.   Review of Systems   Constitutional: Negative for fever.   HENT: Negative.    Eyes: Negative.    Respiratory: Negative for shortness of breath.    Cardiovascular: Negative for chest pain and leg swelling.   Gastrointestinal: Negative for abdominal pain, diarrhea, nausea and vomiting.   Genitourinary: Negative.    Musculoskeletal: Negative for arthralgias.   Skin: Negative for rash.   Psychiatric/Behavioral: Negative.        Objective:   /88 (BP Location: Right arm, Patient Position: Sitting, BP Method: Large (Manual))   Pulse 105   Ht 5' 1" (1.549 m)   Wt 86.6 kg (191 lb)   SpO2 98%   BMI 36.09 kg/m²      Physical Exam   Cardiovascular: Regular rhythm.    tachycardia       Assessment:       1. Mild episode of recurrent major depressive disorder    2. Prothrombin [id number] mutation    3. Cerebral venous sinus thrombosis    4. Need for Tdap vaccination    5. Tachycardia        Plan:       Rosa was seen today for follow-up.    Diagnoses and all orders for this visit:    Mild episode of recurrent major depressive disorder  Doing well on current meds    Prothrombin [id number] mutation  Cerebral venous sinus thrombosis  Monitor    Need for Tdap vaccination  -     (In Office Administered) Tdap Vaccine    Tachycardia  -     EKG 12-lead; Future  Bupropion side effect?          "

## 2018-01-04 ENCOUNTER — OFFICE VISIT (OUTPATIENT)
Dept: OBSTETRICS AND GYNECOLOGY | Facility: CLINIC | Age: 28
End: 2018-01-04
Attending: OBSTETRICS & GYNECOLOGY
Payer: COMMERCIAL

## 2018-01-04 VITALS
DIASTOLIC BLOOD PRESSURE: 68 MMHG | WEIGHT: 192.88 LBS | SYSTOLIC BLOOD PRESSURE: 102 MMHG | HEIGHT: 61 IN | BODY MASS INDEX: 36.42 KG/M2

## 2018-01-04 DIAGNOSIS — N87.1 MODERATE DYSPLASIA OF CERVIX: Primary | ICD-10-CM

## 2018-01-04 PROCEDURE — 99024 POSTOP FOLLOW-UP VISIT: CPT | Mod: S$GLB,,, | Performed by: OBSTETRICS & GYNECOLOGY

## 2018-01-04 PROCEDURE — 99999 PR PBB SHADOW E&M-EST. PATIENT-LVL III: CPT | Mod: PBBFAC,,, | Performed by: OBSTETRICS & GYNECOLOGY

## 2018-01-04 NOTE — PROGRESS NOTES
"Subjective:       Rosa Limon is a 27 y.o. female who presents to the clinic 4 weeks status post LEEP for DONALD 2. . Eating a regular diet without difficulty. Bowel movements are normal. The patient is not having any pain.    Path- benign. No dysplasia    Objective:      /68   Ht 5' 1" (1.549 m)   Wt 87.5 kg (192 lb 14.4 oz)   LMP  (LMP Unknown)   BMI 36.45 kg/m²   General:  alert and cooperative   Abdomen: soft, bowel sounds active, non-tender   Cervix :       healing well, no drainage, no erythema         Assessment:      Doing well postoperatively.  Operative findings again reviewed. Pathology report discussed.      Plan:      1. Continue any current medications.  2. Wound care discussed.  3. Activity restrictions: none  4. Anticipated return to work: now.  5. Follow up: . 4-6 months for repeat pap  "

## 2018-05-03 ENCOUNTER — OFFICE VISIT (OUTPATIENT)
Dept: URGENT CARE | Facility: CLINIC | Age: 28
End: 2018-05-03
Payer: COMMERCIAL

## 2018-05-03 VITALS
DIASTOLIC BLOOD PRESSURE: 84 MMHG | TEMPERATURE: 99 F | OXYGEN SATURATION: 97 % | WEIGHT: 185 LBS | RESPIRATION RATE: 18 BRPM | HEIGHT: 61 IN | BODY MASS INDEX: 34.93 KG/M2 | HEART RATE: 108 BPM | SYSTOLIC BLOOD PRESSURE: 122 MMHG

## 2018-05-03 DIAGNOSIS — J32.9 SINOBRONCHITIS: Primary | ICD-10-CM

## 2018-05-03 DIAGNOSIS — J40 SINOBRONCHITIS: Primary | ICD-10-CM

## 2018-05-03 PROCEDURE — 96372 THER/PROPH/DIAG INJ SC/IM: CPT | Mod: S$GLB,,, | Performed by: FAMILY MEDICINE

## 2018-05-03 PROCEDURE — 99214 OFFICE O/P EST MOD 30 MIN: CPT | Mod: 25,S$GLB,, | Performed by: PHYSICIAN ASSISTANT

## 2018-05-03 RX ORDER — AZITHROMYCIN 250 MG/1
TABLET, FILM COATED ORAL
Qty: 6 TABLET | Refills: 0 | Status: SHIPPED | OUTPATIENT
Start: 2018-05-03 | End: 2018-07-31

## 2018-05-03 RX ORDER — BETAMETHASONE SODIUM PHOSPHATE AND BETAMETHASONE ACETATE 3; 3 MG/ML; MG/ML
6 INJECTION, SUSPENSION INTRA-ARTICULAR; INTRALESIONAL; INTRAMUSCULAR; SOFT TISSUE
Status: COMPLETED | OUTPATIENT
Start: 2018-05-03 | End: 2018-05-03

## 2018-05-03 RX ADMIN — BETAMETHASONE SODIUM PHOSPHATE AND BETAMETHASONE ACETATE 6 MG: 3; 3 INJECTION, SUSPENSION INTRA-ARTICULAR; INTRALESIONAL; INTRAMUSCULAR; SOFT TISSUE at 09:05

## 2018-05-03 NOTE — PROGRESS NOTES
"Subjective:       Patient ID: Rosa Limon is a 28 y.o. female.    Vitals:  height is 5' 1" (1.549 m) and weight is 83.9 kg (185 lb). Her temperature is 98.7 °F (37.1 °C). Her blood pressure is 122/84 and her pulse is 108. Her respiration is 18 and oxygen saturation is 97%.     Chief Complaint: Sinus Problem    Sinus Problem   This is a new problem. The current episode started in the past 7 days. The problem has been gradually worsening since onset. The maximum temperature recorded prior to her arrival was 101 - 101.9 F. The fever has been present for less than 1 day. Her pain is at a severity of 0/10. She is experiencing no pain. Associated symptoms include congestion, coughing and a sore throat. Pertinent negatives include no chills, ear pain, headaches, hoarse voice or shortness of breath. Treatments tried: Ibuprofen  The treatment provided significant relief.     Review of Systems   Constitution: Positive for fever. Negative for chills and malaise/fatigue.   HENT: Positive for congestion and sore throat. Negative for ear pain and hoarse voice.    Eyes: Negative for discharge and redness.   Cardiovascular: Negative for chest pain, dyspnea on exertion and leg swelling.   Respiratory: Positive for cough and sputum production. Negative for shortness of breath and wheezing.    Musculoskeletal: Negative for myalgias.   Gastrointestinal: Negative for abdominal pain and nausea.   Neurological: Negative for headaches.       Objective:      Physical Exam   Constitutional: She is oriented to person, place, and time. She appears well-developed and well-nourished. She is cooperative.  Non-toxic appearance. She does not appear ill. No distress.   HENT:   Head: Normocephalic and atraumatic.   Right Ear: Hearing, tympanic membrane, external ear and ear canal normal.   Left Ear: Hearing, tympanic membrane, external ear and ear canal normal.   Nose: Mucosal edema, rhinorrhea and sinus tenderness present. No nasal " deformity. No epistaxis. Right sinus exhibits maxillary sinus tenderness. Right sinus exhibits no frontal sinus tenderness. Left sinus exhibits maxillary sinus tenderness. Left sinus exhibits no frontal sinus tenderness.   Mouth/Throat: Uvula is midline, oropharynx is clear and moist and mucous membranes are normal. No trismus in the jaw. Normal dentition. No uvula swelling. No posterior oropharyngeal erythema. Tonsils are 0 on the right. Tonsils are 0 on the left.   Eyes: Conjunctivae and lids are normal. No scleral icterus.   Sclera clear bilat   Neck: Trachea normal, full passive range of motion without pain and phonation normal. Neck supple.   Cardiovascular: Normal rate, regular rhythm, normal heart sounds, intact distal pulses and normal pulses.    Pulmonary/Chest: Effort normal. No accessory muscle usage. No respiratory distress. She has no decreased breath sounds. She has no wheezes. She has no rhonchi. She has no rales.   Moderate upper airway congestion with occasional nonproductive cough   Abdominal: Soft. Normal appearance and bowel sounds are normal. She exhibits no distension. There is no tenderness.   Musculoskeletal: Normal range of motion. She exhibits no edema or deformity.   Neurological: She is alert and oriented to person, place, and time. She exhibits normal muscle tone. Coordination normal.   Skin: Skin is warm, dry and intact. She is not diaphoretic. No pallor.   Psychiatric: She has a normal mood and affect. Her speech is normal and behavior is normal. Judgment and thought content normal. Cognition and memory are normal.   Nursing note and vitals reviewed.      Assessment:       1. Sinobronchitis        Plan:         Sinobronchitis  -     betamethasone acetate-betamethasone sodium phosphate injection 6 mg; Inject 1 mL (6 mg total) into the muscle one time.  -     azithromycin (ZITHROMAX Z-ALANNA) 250 MG tablet; Take 2 tablets (500 mg) on  Day 1,  followed by 1 tablet (250 mg) once daily on Days  2 through 5.  Dispense: 6 tablet; Refill: 0        Sinusitis (Antibiotic Treatment)    The sinuses are air-filled spaces within the bones of the face. They connect to the inside of the nose. Sinusitis is an inflammation of the tissue lining the sinus cavity. Sinus inflammation can occur during a cold. It can also be due to allergies to pollens and other particles in the air. Sinusitis can cause symptoms of sinus congestion and fullness. A sinus infection causes fever, headache and facial pain. There is often green or yellow drainage from the nose or into the back of the throat (post-nasal drip). You have been given antibiotics to treat this condition.  Home care:  · Take the full course of antibiotics as instructed. Do not stop taking them, even if you feel better.  · Drink plenty of water, hot tea, and other liquids. This may help thin mucus. It also may promote sinus drainage.  · Heat may help soothe painful areas of the face. Use a towel soaked in hot water. Or,  the shower and direct the hot spray onto your face. Using a vaporizer along with a menthol rub at night may also help.   · An expectorant containing guaifenesin may help thin the mucus and promote drainage from the sinuses.  · Over-the-counter decongestants may be used unless a similar medicine was prescribed. Nasal sprays work the fastest. Use one that contains phenylephrine or oxymetazoline. First blow the nose gently. Then use the spray. Do not use these medicines more often than directed on the label or symptoms may get worse. You may also use tablets containing pseudoephedrine. Avoid products that combine ingredients, because side effects may be increased. Read labels. You can also ask the pharmacist for help. (NOTE: Persons with high blood pressure should not use decongestants. They can raise blood pressure.)  · Over-the-counter antihistamines may help if allergies contributed to your sinusitis.    · Do not use nasal rinses or irrigation  during an acute sinus infection, unless told to by your health care provider. Rinsing may spread the infection to other sinuses.  · Use acetaminophen or ibuprofen to control pain, unless another pain medicine was prescribed. (If you have chronic liver or kidney disease or ever had a stomach ulcer, talk with your doctor before using these medicines. Aspirin should never be used in anyone under 18 years of age who is ill with a fever. It may cause severe liver damage.)  · Don't smoke. This can worsen symptoms.  Follow-up care  Follow up with your healthcare provider or our staff if you are not improving within the next week.  When to seek medical advice  Call your healthcare provider if any of these occur:  · Facial pain or headache becoming more severe  · Stiff neck  · Unusual drowsiness or confusion  · Swelling of the forehead or eyelids  · Vision problems, including blurred or double vision  · Fever of 100.4ºF (38ºC) or higher, or as directed by your healthcare provider  · Seizure  · Breathing problems  · Symptoms not resolving within 10 days  Date Last Reviewed: 4/13/2015  © 4009-0930 [employer]. [address] [place] [address]. All rights reserved. This information is not intended as a substitute for professional medical care. Always follow your healthcare professional's instructions.        Bronchitis, Antibiotic Treatment (Adult)    Bronchitis is an infection of the air passages (bronchial tubes) in your lungs. It often occurs when you have a cold. This illness is contagious during the first few days and is spread through the air by coughing and sneezing, or by direct contact (touching the sick person and then touching your own eyes, nose, or mouth).  Symptoms of bronchitis include cough with mucus (phlegm) and low-grade fever. Bronchitis usually lasts 7 to 14 days. Mild cases can be treated with simple home remedies. More severe infection is treated with an antibiotic.  Home  care  Follow these guidelines when caring for yourself at home:  · If your symptoms are severe, rest at home for the first 2 to 3 days. When you go back to your usual activities, don't let yourself get too tired.  · Do not smoke. Also avoid being exposed to secondhand smoke.  · You may use over-the-counter medicines to control fever or pain, unless another medicine was prescribed. (Note: If you have chronic liver or kidney disease or have ever had a stomach ulcer or gastrointestinal bleeding, talk with your healthcare provider before using these medicines. Also talk to your provider if you are taking medicine to prevent blood clots.) Aspirin should never be given to anyone younger than 18 years of age who is ill with a viral infection or fever. It may cause severe liver or brain damage.  · Your appetite may be poor, so a light diet is fine. Avoid dehydration by drinking 6 to 8 glasses of fluids per day (such as water, soft drinks, sports drinks, juices, tea, or soup). Extra fluids will help loosen secretions in the nose and lungs.  · Over-the-counter cough, cold, and sore-throat medicines will not shorten the length of the illness, but they may be helpful to reduce symptoms. (Note: Do not use decongestants if you have high blood pressure.)  · Finish all antibiotic medicine. Do this even if you are feeling better after only a few days.  Follow-up care  Follow up with your healthcare provider, or as advised. If you had an X-ray or ECG (electrocardiogram), a specialist will review it. You will be notified of any new findings that may affect your care.  Note: If you are age 65 or older, or if you have a chronic lung disease or condition that affects your immune system, or you smoke, talk to your healthcare provider about having pneumococcal vaccinations and a yearly influenza vaccination (flu shot).  When to seek medical advice  Call your healthcare provider right away if any of these occur:  · Fever of 100.4°F (38°C)  or higher  · Coughing up increased amounts of colored sputum  · Weakness, drowsiness, headache, facial pain, ear pain, or a stiff neck  Call 911, or get immediate medical care  Contact emergency services right away if any of these occur.  · Coughing up blood  · Worsening weakness, drowsiness, headache, or stiff neck  · Trouble breathing, wheezing, or pain with breathing  Date Last Reviewed: 9/13/2015  © 9890-5725 [employer]. [address] [place] [address]. All rights reserved. This information is not intended as a substitute for professional medical care. Always follow your healthcare professional's instructions.      Please follow up with your Primary care provider within 2-5 days if your signs and symptoms have not resolved or worsen.     If your condition worsens or fails to improve we recommend that you receive another evaluation at the emergency room immediately or contact your primary medical clinic to discuss your concerns.   You must understand that you have received an Urgent Care treatment only and that you may be released before all of your medical problems are known or treated. You, the patient, will arrange for follow up care as instructed.

## 2018-05-03 NOTE — PATIENT INSTRUCTIONS
Sinusitis (Antibiotic Treatment)    The sinuses are air-filled spaces within the bones of the face. They connect to the inside of the nose. Sinusitis is an inflammation of the tissue lining the sinus cavity. Sinus inflammation can occur during a cold. It can also be due to allergies to pollens and other particles in the air. Sinusitis can cause symptoms of sinus congestion and fullness. A sinus infection causes fever, headache and facial pain. There is often green or yellow drainage from the nose or into the back of the throat (post-nasal drip). You have been given antibiotics to treat this condition.  Home care:  · Take the full course of antibiotics as instructed. Do not stop taking them, even if you feel better.  · Drink plenty of water, hot tea, and other liquids. This may help thin mucus. It also may promote sinus drainage.  · Heat may help soothe painful areas of the face. Use a towel soaked in hot water. Or,  the shower and direct the hot spray onto your face. Using a vaporizer along with a menthol rub at night may also help.   · An expectorant containing guaifenesin may help thin the mucus and promote drainage from the sinuses.  · Over-the-counter decongestants may be used unless a similar medicine was prescribed. Nasal sprays work the fastest. Use one that contains phenylephrine or oxymetazoline. First blow the nose gently. Then use the spray. Do not use these medicines more often than directed on the label or symptoms may get worse. You may also use tablets containing pseudoephedrine. Avoid products that combine ingredients, because side effects may be increased. Read labels. You can also ask the pharmacist for help. (NOTE: Persons with high blood pressure should not use decongestants. They can raise blood pressure.)  · Over-the-counter antihistamines may help if allergies contributed to your sinusitis.    · Do not use nasal rinses or irrigation during an acute sinus infection, unless told to by  your health care provider. Rinsing may spread the infection to other sinuses.  · Use acetaminophen or ibuprofen to control pain, unless another pain medicine was prescribed. (If you have chronic liver or kidney disease or ever had a stomach ulcer, talk with your doctor before using these medicines. Aspirin should never be used in anyone under 18 years of age who is ill with a fever. It may cause severe liver damage.)  · Don't smoke. This can worsen symptoms.  Follow-up care  Follow up with your healthcare provider or our staff if you are not improving within the next week.  When to seek medical advice  Call your healthcare provider if any of these occur:  · Facial pain or headache becoming more severe  · Stiff neck  · Unusual drowsiness or confusion  · Swelling of the forehead or eyelids  · Vision problems, including blurred or double vision  · Fever of 100.4ºF (38ºC) or higher, or as directed by your healthcare provider  · Seizure  · Breathing problems  · Symptoms not resolving within 10 days  Date Last Reviewed: 4/13/2015  © 9669-7050 [employer]. [address] [place] [address]. All rights reserved. This information is not intended as a substitute for professional medical care. Always follow your healthcare professional's instructions.        Bronchitis, Antibiotic Treatment (Adult)    Bronchitis is an infection of the air passages (bronchial tubes) in your lungs. It often occurs when you have a cold. This illness is contagious during the first few days and is spread through the air by coughing and sneezing, or by direct contact (touching the sick person and then touching your own eyes, nose, or mouth).  Symptoms of bronchitis include cough with mucus (phlegm) and low-grade fever. Bronchitis usually lasts 7 to 14 days. Mild cases can be treated with simple home remedies. More severe infection is treated with an antibiotic.  Home care  Follow these guidelines when caring for yourself at  home:  · If your symptoms are severe, rest at home for the first 2 to 3 days. When you go back to your usual activities, don't let yourself get too tired.  · Do not smoke. Also avoid being exposed to secondhand smoke.  · You may use over-the-counter medicines to control fever or pain, unless another medicine was prescribed. (Note: If you have chronic liver or kidney disease or have ever had a stomach ulcer or gastrointestinal bleeding, talk with your healthcare provider before using these medicines. Also talk to your provider if you are taking medicine to prevent blood clots.) Aspirin should never be given to anyone younger than 18 years of age who is ill with a viral infection or fever. It may cause severe liver or brain damage.  · Your appetite may be poor, so a light diet is fine. Avoid dehydration by drinking 6 to 8 glasses of fluids per day (such as water, soft drinks, sports drinks, juices, tea, or soup). Extra fluids will help loosen secretions in the nose and lungs.  · Over-the-counter cough, cold, and sore-throat medicines will not shorten the length of the illness, but they may be helpful to reduce symptoms. (Note: Do not use decongestants if you have high blood pressure.)  · Finish all antibiotic medicine. Do this even if you are feeling better after only a few days.  Follow-up care  Follow up with your healthcare provider, or as advised. If you had an X-ray or ECG (electrocardiogram), a specialist will review it. You will be notified of any new findings that may affect your care.  Note: If you are age 65 or older, or if you have a chronic lung disease or condition that affects your immune system, or you smoke, talk to your healthcare provider about having pneumococcal vaccinations and a yearly influenza vaccination (flu shot).  When to seek medical advice  Call your healthcare provider right away if any of these occur:  · Fever of 100.4°F (38°C) or higher  · Coughing up increased amounts of colored  sputum  · Weakness, drowsiness, headache, facial pain, ear pain, or a stiff neck  Call 911, or get immediate medical care  Contact emergency services right away if any of these occur.  · Coughing up blood  · Worsening weakness, drowsiness, headache, or stiff neck  · Trouble breathing, wheezing, or pain with breathing  Date Last Reviewed: 9/13/2015  © 5284-2786 [employer]. [address] [place] [address], [place] [address]. All rights reserved. This information is not intended as a substitute for professional medical care. Always follow your healthcare professional's instructions.      Please follow up with your Primary care provider within 2-5 days if your signs and symptoms have not resolved or worsen.     If your condition worsens or fails to improve we recommend that you receive another evaluation at the emergency room immediately or contact your primary medical clinic to discuss your concerns.   You must understand that you have received an Urgent Care treatment only and that you may be released before all of your medical problems are known or treated. You, the patient, will arrange for follow up care as instructed.

## 2018-05-03 NOTE — LETTER
May 3, 2018      [facility] [place] [address]  Phone: [phone]  Fax: [phone]       Patient: Rosa Limon   YOB: 1990  Date of Visit: 05/03/2018    To Whom It May Concern:    Mary Limon  was at [facility] on 05/03/2018. She may return to work/school on 5/4/2018 with no restrictions. If you have any questions or concerns, or if I can be of further assistance, please do not hesitate to contact me.    Sincerely,    Sherine Ruiz PA-C

## 2018-05-10 ENCOUNTER — TELEPHONE (OUTPATIENT)
Dept: OBSTETRICS AND GYNECOLOGY | Facility: CLINIC | Age: 28
End: 2018-05-10

## 2018-05-10 NOTE — TELEPHONE ENCOUNTER
Called pt to see if she forgot appt this morning and see if she would like to come or be rescheduled. Left message.

## 2018-07-31 ENCOUNTER — OFFICE VISIT (OUTPATIENT)
Dept: OBSTETRICS AND GYNECOLOGY | Facility: CLINIC | Age: 28
End: 2018-07-31
Attending: OBSTETRICS & GYNECOLOGY
Payer: COMMERCIAL

## 2018-07-31 VITALS
HEIGHT: 61 IN | BODY MASS INDEX: 36.98 KG/M2 | SYSTOLIC BLOOD PRESSURE: 110 MMHG | DIASTOLIC BLOOD PRESSURE: 70 MMHG | WEIGHT: 195.88 LBS

## 2018-07-31 DIAGNOSIS — N87.1 MODERATE DYSPLASIA OF CERVIX (CIN II): ICD-10-CM

## 2018-07-31 DIAGNOSIS — Z12.4 ENCOUNTER FOR PAPANICOLAOU SMEAR FOR CERVICAL CANCER SCREENING: Primary | ICD-10-CM

## 2018-07-31 DIAGNOSIS — Z20.2 POSSIBLE EXPOSURE TO STD: ICD-10-CM

## 2018-07-31 PROCEDURE — 3008F BODY MASS INDEX DOCD: CPT | Mod: CPTII,S$GLB,, | Performed by: OBSTETRICS & GYNECOLOGY

## 2018-07-31 PROCEDURE — 99213 OFFICE O/P EST LOW 20 MIN: CPT | Mod: S$GLB,,, | Performed by: OBSTETRICS & GYNECOLOGY

## 2018-07-31 PROCEDURE — 87491 CHLMYD TRACH DNA AMP PROBE: CPT

## 2018-07-31 PROCEDURE — 99999 PR PBB SHADOW E&M-EST. PATIENT-LVL III: CPT | Mod: PBBFAC,,, | Performed by: OBSTETRICS & GYNECOLOGY

## 2018-07-31 PROCEDURE — 88175 CYTOPATH C/V AUTO FLUID REDO: CPT

## 2018-07-31 RX ORDER — DULOXETIN HYDROCHLORIDE 30 MG/1
30 CAPSULE, DELAYED RELEASE ORAL DAILY
Refills: 2 | COMMUNITY
Start: 2018-05-15 | End: 2020-07-15

## 2018-07-31 NOTE — PROGRESS NOTES
Patient presents for follow up pap #1 of 3. Had LEEP for moderate dysplasia on colpo, LEEP was benign. Mirena in place no periods, string lost with LEEP. Wants meds for removal and replacement. Patient is without complaints today.    ROS - pertinent ROS is negative    Exam:  Gen NAD  Vulva - no lesions  Cervix - no lesions  Uterus - small, nontender  Adnexa - no masses, nontender    Assessment:  Encounter Diagnoses   Name Primary?    Encounter for Papanicolaou smear for cervical cancer screening Yes    Possible exposure to STD     Moderate dysplasia of cervix (DONALD II)          Plan  Pap in 6 months unless today's pap is abnormal

## 2018-08-01 ENCOUNTER — PATIENT MESSAGE (OUTPATIENT)
Dept: OBSTETRICS AND GYNECOLOGY | Facility: CLINIC | Age: 28
End: 2018-08-01

## 2018-08-01 LAB
C TRACH DNA SPEC QL NAA+PROBE: NOT DETECTED
N GONORRHOEA DNA SPEC QL NAA+PROBE: NOT DETECTED

## 2018-08-18 ENCOUNTER — PATIENT MESSAGE (OUTPATIENT)
Dept: OBSTETRICS AND GYNECOLOGY | Facility: CLINIC | Age: 28
End: 2018-08-18

## 2019-03-28 ENCOUNTER — OFFICE VISIT (OUTPATIENT)
Dept: OBSTETRICS AND GYNECOLOGY | Facility: CLINIC | Age: 29
End: 2019-03-28
Attending: OBSTETRICS & GYNECOLOGY
Payer: COMMERCIAL

## 2019-03-28 ENCOUNTER — LAB VISIT (OUTPATIENT)
Dept: LAB | Facility: OTHER | Age: 29
End: 2019-03-28
Attending: OBSTETRICS & GYNECOLOGY
Payer: COMMERCIAL

## 2019-03-28 VITALS
DIASTOLIC BLOOD PRESSURE: 86 MMHG | HEIGHT: 61 IN | WEIGHT: 190.56 LBS | BODY MASS INDEX: 35.98 KG/M2 | SYSTOLIC BLOOD PRESSURE: 112 MMHG

## 2019-03-28 DIAGNOSIS — Z11.51 ENCOUNTER FOR SCREENING FOR HUMAN PAPILLOMAVIRUS (HPV): ICD-10-CM

## 2019-03-28 DIAGNOSIS — Z11.3 SCREENING EXAMINATION FOR STD (SEXUALLY TRANSMITTED DISEASE): ICD-10-CM

## 2019-03-28 DIAGNOSIS — Z12.4 ENCOUNTER FOR PAPANICOLAOU SMEAR FOR CERVICAL CANCER SCREENING: Primary | ICD-10-CM

## 2019-03-28 DIAGNOSIS — Z01.419 ENCOUNTER FOR GYNECOLOGICAL EXAMINATION (GENERAL) (ROUTINE) WITHOUT ABNORMAL FINDINGS: ICD-10-CM

## 2019-03-28 DIAGNOSIS — Z20.2 POSSIBLE EXPOSURE TO STD: ICD-10-CM

## 2019-03-28 PROCEDURE — 99395 PREV VISIT EST AGE 18-39: CPT | Mod: S$GLB,,, | Performed by: OBSTETRICS & GYNECOLOGY

## 2019-03-28 PROCEDURE — 99999 PR PBB SHADOW E&M-EST. PATIENT-LVL III: ICD-10-PCS | Mod: PBBFAC,,, | Performed by: OBSTETRICS & GYNECOLOGY

## 2019-03-28 PROCEDURE — 86592 SYPHILIS TEST NON-TREP QUAL: CPT

## 2019-03-28 PROCEDURE — 99395 PR PREVENTIVE VISIT,EST,18-39: ICD-10-PCS | Mod: S$GLB,,, | Performed by: OBSTETRICS & GYNECOLOGY

## 2019-03-28 PROCEDURE — 88141 LIQUID-BASED PAP SMEAR, SCREENING: ICD-10-PCS | Mod: ,,, | Performed by: PATHOLOGY

## 2019-03-28 PROCEDURE — 88141 CYTOPATH C/V INTERPRET: CPT | Mod: ,,, | Performed by: PATHOLOGY

## 2019-03-28 PROCEDURE — 87340 HEPATITIS B SURFACE AG IA: CPT

## 2019-03-28 PROCEDURE — 36415 COLL VENOUS BLD VENIPUNCTURE: CPT

## 2019-03-28 PROCEDURE — 86696 HERPES SIMPLEX TYPE 2 TEST: CPT

## 2019-03-28 PROCEDURE — 87624 HPV HI-RISK TYP POOLED RSLT: CPT

## 2019-03-28 PROCEDURE — 88175 CYTOPATH C/V AUTO FLUID REDO: CPT | Performed by: PATHOLOGY

## 2019-03-28 PROCEDURE — 99999 PR PBB SHADOW E&M-EST. PATIENT-LVL III: CPT | Mod: PBBFAC,,, | Performed by: OBSTETRICS & GYNECOLOGY

## 2019-03-28 PROCEDURE — 87491 CHLMYD TRACH DNA AMP PROBE: CPT

## 2019-03-28 PROCEDURE — 86703 HIV-1/HIV-2 1 RESULT ANTBDY: CPT

## 2019-03-28 NOTE — PROGRESS NOTES
Subjective:       Patient ID: Rosa Limon is a 29 y.o. female.    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 yo  here for annual exam. No gyn complaints. Doing well. Had LEEP for DONALD on colpo and the LEEP was normal. Will repeat pap and HPV today. Has Mirena, lost strings with LEEP. 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 Recently she and her mom did [place] and were told BrCA positive. After confirmation, test for mom came back negative. All questions answered.       Past Medical History:   Diagnosis Date    Abnormal Pap smear of cervix 2015    (+) Low Grade Squamous Intraepithelial Lesion     ASCUS with positive high risk HPV cervical 2017    Depression     Factor II deficiency     CVA on Nuvaring    HPV in female     Insomnia     Stroke     CVA on Nuvaring. Factor II deficiency        Past Surgical History:   Procedure Laterality Date    CERVICAL BIOPSY  W/ LOOP ELECTRODE EXCISION  2017    COLPOSCOPY  10/12/2017    FRACTURE SURGERY      ORIF wrist left Left 2015    TONSILLECTOMY         OB History    Para Term  AB Living   0 0 0 0 0 0   SAB TAB Ectopic Multiple Live Births   0 0 0 0         No LMP recorded. (Menstrual status: Birth Control).   Date of Last Pap: 8/3/2018    Review of Systems  Review of Systems   Constitutional: Negative for fatigue and  unexpected weight change.   Respiratory: Negative for shortness of breath.    Cardiovascular: Negative for chest pain.   Gastrointestinal: Negative for abdominal pain, constipation, diarrhea, nausea and vomiting.   Genitourinary: Negative for dysuria.   Musculoskeletal: Negative for back pain.   Skin: Negative for rash.   Neurological: Negative for headaches.   Hematological: Does not bruise/bleed easily.   Psychiatric/Behavioral: Negative for behavioral problems.        Objective:   Physical Exam:   Constitutional: She is oriented to person, place, and time. Vital signs are normal. She appears well-developed and well-nourished. No distress.        Pulmonary/Chest: She exhibits no mass. Right breast exhibits no mass, no nipple discharge, no skin change, no tenderness, no bleeding and no swelling. Left breast exhibits no mass, no nipple discharge, no skin change, no tenderness, no bleeding and no swelling. Breasts are symmetrical.        Abdominal: Soft. Normal appearance and bowel sounds are normal. She exhibits no distension and no mass. There is no tenderness. There is no rebound.     Genitourinary: Vagina normal and uterus normal. There is no rash, tenderness, lesion or injury on the right labia. There is no rash, tenderness, lesion or injury on the left labia. Uterus is not deviated, not enlarged, not fixed, not tender, not hosting fibroids and not experiencing uterine prolapse. Cervix is normal. Right adnexum displays no mass, no tenderness and no fullness. Left adnexum displays no mass, no tenderness and no fullness. No erythema, tenderness, rectocele, cystocele or unspecified prolapse of vaginal walls in the vagina. No vaginal discharge found. Cervix exhibits no motion tenderness, no discharge and no friability.           Musculoskeletal: Normal range of motion and moves all extremeties.      Lymphadenopathy:     She has no axillary adenopathy.        Right: No supraclavicular adenopathy present.        Left:  No supraclavicular adenopathy present.    Neurological: She is alert and oriented to person, place, and time.    Skin: Skin is warm and dry.    Psychiatric: She has a normal mood and affect. Her behavior is normal. Judgment normal.        Assessment/ Plan:     1. Encounter for Papanicolaou smear for cervical cancer screening  Liquid-based pap smear, screening   2. Encounter for screening for human papillomavirus (HPV)  HPV High Risk Genotypes, PCR   3. Screening examination for STD (sexually transmitted disease)  C. trachomatis/N. gonorrhoeae by AMP DNA    RPR    Hepatitis B surface antigen    HIV 1/2 Ag/Ab (4th Gen)   4. Encounter for gynecological examination (general) (routine) without abnormal findings         Follow-up with me in 1 year

## 2019-03-29 LAB
C TRACH DNA SPEC QL NAA+PROBE: NOT DETECTED
HBV SURFACE AG SERPL QL IA: NEGATIVE
HIV 1+2 AB+HIV1 P24 AG SERPL QL IA: NEGATIVE
HSV1 IGG SERPL QL IA: NEGATIVE
HSV2 IGG SERPL QL IA: NEGATIVE
N GONORRHOEA DNA SPEC QL NAA+PROBE: NOT DETECTED

## 2019-04-01 LAB — RPR SER QL: NORMAL

## 2019-04-03 LAB
HPV HR 12 DNA CVX QL NAA+PROBE: POSITIVE
HPV16 AG SPEC QL: NEGATIVE
HPV18 DNA SPEC QL NAA+PROBE: NEGATIVE

## 2019-05-13 ENCOUNTER — PROCEDURE VISIT (OUTPATIENT)
Dept: OBSTETRICS AND GYNECOLOGY | Facility: CLINIC | Age: 29
End: 2019-05-13
Payer: COMMERCIAL

## 2019-05-13 VITALS
BODY MASS INDEX: 35.29 KG/M2 | WEIGHT: 186.94 LBS | HEIGHT: 61 IN | DIASTOLIC BLOOD PRESSURE: 80 MMHG | SYSTOLIC BLOOD PRESSURE: 120 MMHG

## 2019-05-13 DIAGNOSIS — Z32.02 NEGATIVE PREGNANCY TEST: Primary | ICD-10-CM

## 2019-05-13 DIAGNOSIS — R87.612 LGSIL ON PAP SMEAR OF CERVIX: ICD-10-CM

## 2019-05-13 LAB
B-HCG UR QL: NEGATIVE
CTP QC/QA: YES

## 2019-05-13 PROCEDURE — 57454 COLPOSCOPY: ICD-10-PCS | Mod: S$GLB,,, | Performed by: OBSTETRICS & GYNECOLOGY

## 2019-05-13 PROCEDURE — 81025 URINE PREGNANCY TEST: CPT | Mod: S$GLB,,, | Performed by: OBSTETRICS & GYNECOLOGY

## 2019-05-13 PROCEDURE — 81025 POCT URINE PREGNANCY: ICD-10-PCS | Mod: S$GLB,,, | Performed by: OBSTETRICS & GYNECOLOGY

## 2019-05-13 PROCEDURE — 88305 TISSUE EXAM BY PATHOLOGIST: CPT | Performed by: PATHOLOGY

## 2019-05-13 PROCEDURE — 88305 TISSUE SPECIMEN TO PATHOLOGY, OBSTETRICS/GYNECOLOGY: ICD-10-PCS | Mod: 26,,, | Performed by: PATHOLOGY

## 2019-05-13 PROCEDURE — 57454 BX/CURETT OF CERVIX W/SCOPE: CPT | Mod: S$GLB,,, | Performed by: OBSTETRICS & GYNECOLOGY

## 2019-05-13 PROCEDURE — 88305 TISSUE EXAM BY PATHOLOGIST: CPT | Mod: 26,,, | Performed by: PATHOLOGY

## 2019-05-13 NOTE — PROCEDURES
Colposcopy  Date/Time: 5/13/2019 11:24 AM  Performed by: Radha Nino MD  Authorized by: Radha Nino MD     Consent Done?:  Yes (Written)  Assistants?: No      Colposcopy Site:  Cervix  Position:  Supine  Acrowhite Lesion? Yes    Atypical Vessels: No    Transformation Zone Adequate?: Yes    Biopsy?: Yes         Location:  Cervix ((8 00))  ECC Performed?: Yes    LEEP Performed?: No    Estimated blood loss (cc):  2   Patient tolerated the procedure well with no immediate complications.   Post-operative instructions were provided for the patient.   Patient was discharged and will follow up if any complications occur     Post surgical changes from LEEP. Appears to be metaplasia. Biopsy done as precaution. Vasovagal with ECC.

## 2020-03-24 ENCOUNTER — TELEPHONE (OUTPATIENT)
Dept: OBSTETRICS AND GYNECOLOGY | Facility: CLINIC | Age: 30
End: 2020-03-24

## 2020-03-24 DIAGNOSIS — Z30.014 ENCOUNTER FOR INITIAL PRESCRIPTION OF INTRAUTERINE CONTRACEPTIVE DEVICE (IUD): Primary | ICD-10-CM

## 2020-07-15 ENCOUNTER — OFFICE VISIT (OUTPATIENT)
Dept: OBSTETRICS AND GYNECOLOGY | Facility: CLINIC | Age: 30
End: 2020-07-15
Attending: OBSTETRICS & GYNECOLOGY
Payer: COMMERCIAL

## 2020-07-15 VITALS
WEIGHT: 185.88 LBS | DIASTOLIC BLOOD PRESSURE: 82 MMHG | BODY MASS INDEX: 35.09 KG/M2 | HEIGHT: 61 IN | SYSTOLIC BLOOD PRESSURE: 124 MMHG

## 2020-07-15 DIAGNOSIS — Z12.4 ENCOUNTER FOR PAPANICOLAOU SMEAR FOR CERVICAL CANCER SCREENING: Primary | ICD-10-CM

## 2020-07-15 DIAGNOSIS — Z11.3 SCREENING EXAMINATION FOR STD (SEXUALLY TRANSMITTED DISEASE): ICD-10-CM

## 2020-07-15 DIAGNOSIS — Z11.51 ENCOUNTER FOR SCREENING FOR HUMAN PAPILLOMAVIRUS (HPV): ICD-10-CM

## 2020-07-15 PROCEDURE — 99395 PR PREVENTIVE VISIT,EST,18-39: ICD-10-PCS | Mod: S$GLB,,, | Performed by: OBSTETRICS & GYNECOLOGY

## 2020-07-15 PROCEDURE — 88141 PR  CYTOPATH CERV/VAG INTERPRET: ICD-10-PCS | Mod: ,,, | Performed by: PATHOLOGY

## 2020-07-15 PROCEDURE — 88175 CYTOPATH C/V AUTO FLUID REDO: CPT | Performed by: PATHOLOGY

## 2020-07-15 PROCEDURE — 88141 CYTOPATH C/V INTERPRET: CPT | Mod: ,,, | Performed by: PATHOLOGY

## 2020-07-15 PROCEDURE — 99999 PR PBB SHADOW E&M-EST. PATIENT-LVL III: ICD-10-PCS | Mod: PBBFAC,,, | Performed by: OBSTETRICS & GYNECOLOGY

## 2020-07-15 PROCEDURE — 87624 HPV HI-RISK TYP POOLED RSLT: CPT

## 2020-07-15 PROCEDURE — 99999 PR PBB SHADOW E&M-EST. PATIENT-LVL III: CPT | Mod: PBBFAC,,, | Performed by: OBSTETRICS & GYNECOLOGY

## 2020-07-15 PROCEDURE — 99395 PREV VISIT EST AGE 18-39: CPT | Mod: S$GLB,,, | Performed by: OBSTETRICS & GYNECOLOGY

## 2020-07-15 PROCEDURE — 87491 CHLMYD TRACH DNA AMP PROBE: CPT

## 2020-07-15 RX ORDER — ASPIRIN 325 MG
TABLET, DELAYED RELEASE (ENTERIC COATED) ORAL
COMMUNITY
Start: 2020-05-19 | End: 2022-09-28

## 2020-07-15 RX ORDER — LAMOTRIGINE 25 MG/1
TABLET ORAL
COMMUNITY
Start: 2020-07-07 | End: 2020-12-10

## 2020-07-15 RX ORDER — VENLAFAXINE HYDROCHLORIDE 150 MG/1
150 CAPSULE, EXTENDED RELEASE ORAL DAILY
COMMUNITY
Start: 2020-06-19 | End: 2020-12-10

## 2020-07-15 NOTE — PROGRESS NOTES
Subjective:       Patient ID: Rosa Limon is a 30 y.o. female.    Chief Complaint:  Annual Exam (last pap/hpv  LGSIL-HPV+other; colpo normal )      Patient Active Problem List   Diagnosis    Cerebral venous thrombosis    HA (headache)    Headache    Dural sinus thrombosis    Cephalalgia    Migraine aura, persistent, intractable, with status migrainosus    Mild episode of recurrent major depressive disorder    Protracted URI    Encounter for therapeutic drug monitoring    Abnormal albumin    Cerebral venous sinus thrombosis    Long term (current) use of anticoagulants    Factor II deficiency    Prothrombin [id number] mutation    Vitamin D insufficiency       History of Present Illness  30 y.o. yo  here for annual exam. No gyn complaints. Doing well. Will repeat pap and HPV today. Happy with Mirena, rare periods.       Past Medical History:   Diagnosis Date    Abnormal Pap smear of cervix 2015    (+) Low Grade Squamous Intraepithelial Lesion     ASCUS with positive high risk HPV cervical 2017    Depression     Factor II deficiency     CVA on Nuvaring    HPV in female     Insomnia     Stroke     CVA on Nuvaring. Factor II deficiency        Past Surgical History:   Procedure Laterality Date    CERVICAL BIOPSY  W/ LOOP ELECTRODE EXCISION  2017    COLPOSCOPY  10/12/2017    FRACTURE SURGERY      ORIF wrist left Left 2015    TONSILLECTOMY         OB History    Para Term  AB Living   0 0 0 0 0 0   SAB TAB Ectopic Multiple Live Births   0 0 0 0         No LMP recorded. (Menstrual status: Birth Control).   Date of Last Pap: 2019    Review of Systems  Review of Systems   Constitutional: Negative for fatigue and unexpected weight change.   Respiratory: Negative for shortness of breath.    Cardiovascular: Negative for chest pain.   Gastrointestinal: Negative for abdominal pain, constipation, diarrhea, nausea and vomiting.   Genitourinary: Negative  for dysuria.   Musculoskeletal: Negative for back pain.   Skin: Negative for rash.   Neurological: Negative for headaches.   Hematological: Does not bruise/bleed easily.   Psychiatric/Behavioral: Negative for behavioral problems.        Objective:   Physical Exam:   Constitutional: She is oriented to person, place, and time. She appears well-developed and well-nourished. No distress.        Pulmonary/Chest: She exhibits no mass. Right breast exhibits no mass, no nipple discharge, no skin change, no tenderness, no bleeding and no swelling. Left breast exhibits no mass, no nipple discharge, no skin change, no tenderness, no bleeding and no swelling. Breasts are symmetrical.        Abdominal: Soft. Normal appearance and bowel sounds are normal. She exhibits no distension and no mass. There is no abdominal tenderness. There is no rebound.     Genitourinary:    Vagina and uterus normal.   There is no rash, tenderness, lesion or injury on the right labia. There is no rash, tenderness, lesion or injury on the left labia. Uterus is not deviated, not enlarged, not fixed, not tender, not hosting fibroids and not experiencing uterine prolapse. Cervix is normal. Right adnexum displays no mass, no tenderness and no fullness. Left adnexum displays no mass, no tenderness and no fullness. No erythema, tenderness, rectocele, cystocele or unspecified prolapse of vaginal walls in the vagina. Cervix exhibits no motion tenderness, no discharge and no friability. Additional cervical findings: IUD strings visualizednegative for vaginal discharge          Musculoskeletal: Normal range of motion and moves all extremeties.      Lymphadenopathy:        Right: No supraclavicular adenopathy present.        Left: No supraclavicular adenopathy present.    Neurological: She is alert and oriented to person, place, and time.    Skin: Skin is warm and dry.    Psychiatric: She has a normal mood and affect. Her behavior is normal. Judgment normal.         Assessment/ Plan:     1. Encounter for Papanicolaou smear for cervical cancer screening  Liquid-Based Pap Smear, Screening   2. Encounter for screening for human papillomavirus (HPV)  HPV High Risk Genotypes, PCR   3. Screening examination for STD (sexually transmitted disease)  C. trachomatis/N. gonorrhoeae by AMP DNA    Hepatitis B Surface Antigen    HIV 1/2 Ag/Ab (4th Gen)    RPR       Follow-up with me in 1 year

## 2020-07-20 LAB
C TRACH DNA SPEC QL NAA+PROBE: NOT DETECTED
N GONORRHOEA DNA SPEC QL NAA+PROBE: NOT DETECTED

## 2020-07-23 LAB
HPV HR 12 DNA SPEC QL NAA+PROBE: NEGATIVE
HPV16 AG SPEC QL: NEGATIVE
HPV18 DNA SPEC QL NAA+PROBE: NEGATIVE

## 2020-07-25 ENCOUNTER — LAB VISIT (OUTPATIENT)
Dept: LAB | Facility: HOSPITAL | Age: 30
End: 2020-07-25
Attending: OBSTETRICS & GYNECOLOGY
Payer: COMMERCIAL

## 2020-07-25 DIAGNOSIS — Z11.3 SCREENING EXAMINATION FOR STD (SEXUALLY TRANSMITTED DISEASE): ICD-10-CM

## 2020-07-25 DIAGNOSIS — Z00.00 ROUTINE MEDICAL EXAM: ICD-10-CM

## 2020-07-25 LAB
ABO + RH BLD: NORMAL
BLD GP AB SCN CELLS X3 SERPL QL: NORMAL

## 2020-07-25 PROCEDURE — 86901 BLOOD TYPING SEROLOGIC RH(D): CPT

## 2020-07-25 PROCEDURE — 86703 HIV-1/HIV-2 1 RESULT ANTBDY: CPT

## 2020-07-25 PROCEDURE — 36415 COLL VENOUS BLD VENIPUNCTURE: CPT | Mod: PO

## 2020-07-25 PROCEDURE — 87340 HEPATITIS B SURFACE AG IA: CPT

## 2020-07-25 PROCEDURE — 86592 SYPHILIS TEST NON-TREP QUAL: CPT

## 2020-07-27 LAB
FINAL PATHOLOGIC DIAGNOSIS: NORMAL
HBV SURFACE AG SERPL QL IA: NEGATIVE
HIV 1+2 AB+HIV1 P24 AG SERPL QL IA: NEGATIVE
Lab: NORMAL
RPR SER QL: NORMAL

## 2020-07-29 ENCOUNTER — TELEPHONE (OUTPATIENT)
Dept: OBSTETRICS AND GYNECOLOGY | Facility: CLINIC | Age: 30
End: 2020-07-29

## 2020-11-02 ENCOUNTER — PATIENT MESSAGE (OUTPATIENT)
Dept: OBSTETRICS AND GYNECOLOGY | Facility: CLINIC | Age: 30
End: 2020-11-02

## 2020-11-02 DIAGNOSIS — Z30.014 ENCOUNTER FOR INITIAL PRESCRIPTION OF INTRAUTERINE CONTRACEPTIVE DEVICE (IUD): Primary | ICD-10-CM

## 2020-11-24 ENCOUNTER — TELEPHONE (OUTPATIENT)
Dept: OBSTETRICS AND GYNECOLOGY | Facility: CLINIC | Age: 30
End: 2020-11-24

## 2020-11-24 DIAGNOSIS — Z30.430 ENCOUNTER FOR IUD INSERTION: Primary | ICD-10-CM

## 2020-11-25 ENCOUNTER — PATIENT MESSAGE (OUTPATIENT)
Dept: OBSTETRICS AND GYNECOLOGY | Facility: CLINIC | Age: 30
End: 2020-11-25

## 2020-11-25 NOTE — TELEPHONE ENCOUNTER
----- Message from [facility] Patient Care Assistant sent at 11/24/2020  5:06 PM CST -----  iud

## 2020-12-08 ENCOUNTER — PATIENT MESSAGE (OUTPATIENT)
Dept: OBSTETRICS AND GYNECOLOGY | Facility: CLINIC | Age: 30
End: 2020-12-08

## 2020-12-08 DIAGNOSIS — F41.9 ANXIETY DUE TO INVASIVE PROCEDURE: Primary | ICD-10-CM

## 2020-12-08 NOTE — TELEPHONE ENCOUNTER
Pt states you are sending in something for pain and Xanax?     Mother is driving her to her appointment.    See messages.  She had a concern about mirena causing people to lose vision?

## 2020-12-09 RX ORDER — MISOPROSTOL 200 UG/1
TABLET ORAL
Qty: 2 TABLET | Refills: 0 | Status: SHIPPED | OUTPATIENT
Start: 2020-12-09 | End: 2021-02-18

## 2020-12-09 RX ORDER — ALPRAZOLAM 0.5 MG/1
0.5 TABLET ORAL 3 TIMES DAILY PRN
Qty: 5 TABLET | Refills: 0 | Status: SHIPPED | OUTPATIENT
Start: 2020-12-09 | End: 2021-02-18

## 2020-12-10 ENCOUNTER — PATIENT MESSAGE (OUTPATIENT)
Dept: OBSTETRICS AND GYNECOLOGY | Facility: CLINIC | Age: 30
End: 2020-12-10

## 2020-12-10 ENCOUNTER — PROCEDURE VISIT (OUTPATIENT)
Dept: OBSTETRICS AND GYNECOLOGY | Facility: CLINIC | Age: 30
End: 2020-12-10
Attending: OBSTETRICS & GYNECOLOGY
Payer: COMMERCIAL

## 2020-12-10 VITALS
DIASTOLIC BLOOD PRESSURE: 84 MMHG | SYSTOLIC BLOOD PRESSURE: 122 MMHG | WEIGHT: 183.19 LBS | BODY MASS INDEX: 34.58 KG/M2 | HEIGHT: 61 IN

## 2020-12-10 DIAGNOSIS — Z30.432 ENCOUNTER FOR IUD REMOVAL: Primary | ICD-10-CM

## 2020-12-10 RX ORDER — LAMOTRIGINE 200 MG/1
200 TABLET ORAL DAILY
COMMUNITY
Start: 2020-11-30 | End: 2023-01-20 | Stop reason: CLARIF

## 2020-12-10 RX ORDER — ESCITALOPRAM OXALATE 5 MG/1
TABLET ORAL
COMMUNITY
End: 2021-02-18

## 2020-12-10 NOTE — Clinical Note
Requested Date: 2021  Case Length:30 minutes  Surgeon: Radha Nino      Co- Surgeon: None   Visit Type: Outpatient  PROCEDURE:Hysteroscopy IUD removal and reinsertion (lyletta)  Diagnosis:retained IUD  Anesthesia type: General   Comments/Special Equipment Needed:  Rep needed: no  U/S needed at pre-op: no  PCP clearance: Not Needed

## 2020-12-11 NOTE — PROCEDURES
Procedures     Here for IUD removal and reinsertion, not able to see strings.   Under u/s guidance, with Motrin, cytotec and xanax, attempted to grasp strings inside cervix and it was too uncomfortable. Aborted procedure. Will schedule in OR

## 2020-12-18 ENCOUNTER — TELEPHONE (OUTPATIENT)
Dept: OBSTETRICS AND GYNECOLOGY | Facility: CLINIC | Age: 30
End: 2020-12-18

## 2020-12-18 DIAGNOSIS — Z01.818 PRE-PROCEDURAL EXAMINATION: ICD-10-CM

## 2020-12-18 DIAGNOSIS — Z30.432 ENCOUNTER FOR IUD REMOVAL: Primary | ICD-10-CM

## 2020-12-18 DIAGNOSIS — Z30.430 VISIT FOR INSERTION OF INTRAUTERINE DEVICE: ICD-10-CM

## 2020-12-18 DIAGNOSIS — T83.39XA RETAINED INTRAUTERINE CONTRACEPTIVE DEVICE (IUD): ICD-10-CM

## 2021-02-18 ENCOUNTER — HOSPITAL ENCOUNTER (OUTPATIENT)
Dept: PREADMISSION TESTING | Facility: OTHER | Age: 31
Discharge: HOME | End: 2021-02-18
Attending: OBSTETRICS & GYNECOLOGY
Payer: COMMERCIAL

## 2021-02-18 ENCOUNTER — ANESTHESIA EVENT (OUTPATIENT)
Dept: SURGERY | Facility: OTHER | Age: 31
End: 2021-02-18
Payer: COMMERCIAL

## 2021-02-18 ENCOUNTER — OFFICE VISIT (OUTPATIENT)
Dept: OBSTETRICS AND GYNECOLOGY | Facility: CLINIC | Age: 31
End: 2021-02-18
Attending: OBSTETRICS & GYNECOLOGY
Payer: COMMERCIAL

## 2021-02-18 VITALS
WEIGHT: 193.81 LBS | HEIGHT: 61 IN | BODY MASS INDEX: 36.59 KG/M2 | DIASTOLIC BLOOD PRESSURE: 70 MMHG | SYSTOLIC BLOOD PRESSURE: 116 MMHG

## 2021-02-18 VITALS
SYSTOLIC BLOOD PRESSURE: 127 MMHG | HEIGHT: 61 IN | BODY MASS INDEX: 34.93 KG/M2 | WEIGHT: 185 LBS | HEART RATE: 98 BPM | DIASTOLIC BLOOD PRESSURE: 79 MMHG | TEMPERATURE: 97 F | OXYGEN SATURATION: 99 %

## 2021-02-18 DIAGNOSIS — Z30.433 ENCOUNTER FOR IUD REMOVAL AND REINSERTION: ICD-10-CM

## 2021-02-18 DIAGNOSIS — T83.39XA RETAINED INTRAUTERINE CONTRACEPTIVE DEVICE (IUD): Primary | ICD-10-CM

## 2021-02-18 DIAGNOSIS — T83.39XA RETAINED INTRAUTERINE CONTRACEPTIVE DEVICE (IUD): ICD-10-CM

## 2021-02-18 LAB
ABO + RH BLD: NORMAL
BASOPHILS # BLD AUTO: 0.07 K/UL (ref 0–0.2)
BASOPHILS NFR BLD: 0.7 % (ref 0–1.9)
BLD GP AB SCN CELLS X3 SERPL QL: NORMAL
DIFFERENTIAL METHOD: ABNORMAL
EOSINOPHIL # BLD AUTO: 0.2 K/UL (ref 0–0.5)
EOSINOPHIL NFR BLD: 1.9 % (ref 0–8)
ERYTHROCYTE [DISTWIDTH] IN BLOOD BY AUTOMATED COUNT: 13.2 % (ref 11.5–14.5)
HCT VFR BLD AUTO: 40.4 % (ref 37–48.5)
HGB BLD-MCNC: 13.3 G/DL (ref 12–16)
IMM GRANULOCYTES # BLD AUTO: 0.03 K/UL (ref 0–0.04)
IMM GRANULOCYTES NFR BLD AUTO: 0.3 % (ref 0–0.5)
LYMPHOCYTES # BLD AUTO: 3.1 K/UL (ref 1–4.8)
LYMPHOCYTES NFR BLD: 29.2 % (ref 18–48)
MCH RBC QN AUTO: 30 PG (ref 27–31)
MCHC RBC AUTO-ENTMCNC: 32.9 G/DL (ref 32–36)
MCV RBC AUTO: 91 FL (ref 82–98)
MONOCYTES # BLD AUTO: 0.8 K/UL (ref 0.3–1)
MONOCYTES NFR BLD: 7 % (ref 4–15)
NEUTROPHILS # BLD AUTO: 6.6 K/UL (ref 1.8–7.7)
NEUTROPHILS NFR BLD: 60.9 % (ref 38–73)
NRBC BLD-RTO: 0 /100 WBC
PLATELET # BLD AUTO: 335 K/UL (ref 150–350)
PMV BLD AUTO: 8.9 FL (ref 9.2–12.9)
RBC # BLD AUTO: 4.43 M/UL (ref 4–5.4)
WBC # BLD AUTO: 10.74 K/UL (ref 3.9–12.7)

## 2021-02-18 PROCEDURE — 36415 COLL VENOUS BLD VENIPUNCTURE: CPT

## 2021-02-18 PROCEDURE — 99499 NO LOS: ICD-10-PCS | Mod: S$GLB,,, | Performed by: OBSTETRICS & GYNECOLOGY

## 2021-02-18 PROCEDURE — 86900 BLOOD TYPING SEROLOGIC ABO: CPT

## 2021-02-18 PROCEDURE — 99499 UNLISTED E&M SERVICE: CPT | Mod: S$GLB,,, | Performed by: OBSTETRICS & GYNECOLOGY

## 2021-02-18 PROCEDURE — 99999 PR PBB SHADOW E&M-EST. PATIENT-LVL III: CPT | Mod: PBBFAC,,, | Performed by: OBSTETRICS & GYNECOLOGY

## 2021-02-18 PROCEDURE — 85025 COMPLETE CBC W/AUTO DIFF WBC: CPT

## 2021-02-18 PROCEDURE — 99999 PR PBB SHADOW E&M-EST. PATIENT-LVL III: ICD-10-PCS | Mod: PBBFAC,,, | Performed by: OBSTETRICS & GYNECOLOGY

## 2021-02-18 RX ORDER — SODIUM CHLORIDE, SODIUM LACTATE, POTASSIUM CHLORIDE, CALCIUM CHLORIDE 600; 310; 30; 20 MG/100ML; MG/100ML; MG/100ML; MG/100ML
INJECTION, SOLUTION INTRAVENOUS CONTINUOUS
Status: CANCELLED | OUTPATIENT
Start: 2021-02-18

## 2021-02-18 RX ORDER — LIDOCAINE HYDROCHLORIDE 10 MG/ML
0.5 INJECTION, SOLUTION EPIDURAL; INFILTRATION; INTRACAUDAL; PERINEURAL ONCE
Status: CANCELLED | OUTPATIENT
Start: 2021-02-18 | End: 2021-02-18

## 2021-02-18 RX ORDER — ACETAMINOPHEN 500 MG
1000 TABLET ORAL
Status: CANCELLED | OUTPATIENT
Start: 2021-02-18 | End: 2021-02-18

## 2021-02-18 RX ORDER — ESCITALOPRAM OXALATE 20 MG/1
20 TABLET ORAL DAILY
COMMUNITY
End: 2021-10-22

## 2021-02-23 ENCOUNTER — LAB VISIT (OUTPATIENT)
Dept: FAMILY MEDICINE | Facility: CLINIC | Age: 31
End: 2021-02-23
Payer: COMMERCIAL

## 2021-02-23 DIAGNOSIS — Z01.818 PRE-PROCEDURAL EXAMINATION: ICD-10-CM

## 2021-02-23 PROCEDURE — U0003 INFECTIOUS AGENT DETECTION BY NUCLEIC ACID (DNA OR RNA); SEVERE ACUTE RESPIRATORY SYNDROME CORONAVIRUS 2 (SARS-COV-2) (CORONAVIRUS DISEASE [COVID-19]), AMPLIFIED PROBE TECHNIQUE, MAKING USE OF HIGH THROUGHPUT TECHNOLOGIES AS DESCRIBED BY CMS-2020-01-R: HCPCS

## 2021-02-23 PROCEDURE — U0005 INFEC AGEN DETEC AMPLI PROBE: HCPCS

## 2021-02-24 LAB — SARS-COV-2 RNA RESP QL NAA+PROBE: NOT DETECTED

## 2021-02-26 ENCOUNTER — ANESTHESIA (OUTPATIENT)
Dept: SURGERY | Facility: OTHER | Age: 31
End: 2021-02-26
Payer: COMMERCIAL

## 2021-02-26 ENCOUNTER — HOSPITAL ENCOUNTER (OUTPATIENT)
Facility: OTHER | Age: 31
Discharge: HOME | End: 2021-02-26
Attending: OBSTETRICS & GYNECOLOGY | Admitting: OBSTETRICS & GYNECOLOGY
Payer: COMMERCIAL

## 2021-02-26 VITALS
BODY MASS INDEX: 34.93 KG/M2 | RESPIRATION RATE: 16 BRPM | WEIGHT: 185 LBS | OXYGEN SATURATION: 95 % | TEMPERATURE: 98 F | SYSTOLIC BLOOD PRESSURE: 110 MMHG | HEART RATE: 87 BPM | DIASTOLIC BLOOD PRESSURE: 68 MMHG | HEIGHT: 61 IN

## 2021-02-26 DIAGNOSIS — T83.39XA RETAINED INTRAUTERINE CONTRACEPTIVE DEVICE (IUD): Primary | ICD-10-CM

## 2021-02-26 DIAGNOSIS — Z30.433 ENCOUNTER FOR IUD REMOVAL AND REINSERTION: ICD-10-CM

## 2021-02-26 LAB
B-HCG UR QL: NEGATIVE
CTP QC/QA: YES

## 2021-02-26 PROCEDURE — 88300 PR  SURG PATH,GROSS,LEVEL I: ICD-10-PCS | Mod: 26,,, | Performed by: PATHOLOGY

## 2021-02-26 PROCEDURE — 58301 PR REMOVE, INTRAUTERINE DEVICE: ICD-10-PCS | Mod: ,,, | Performed by: OBSTETRICS & GYNECOLOGY

## 2021-02-26 PROCEDURE — 88300 SURGICAL PATH GROSS: CPT | Mod: 26,,, | Performed by: PATHOLOGY

## 2021-02-26 PROCEDURE — 81025 URINE PREGNANCY TEST: CPT | Performed by: OBSTETRICS & GYNECOLOGY

## 2021-02-26 PROCEDURE — 58301 REMOVE INTRAUTERINE DEVICE: CPT | Mod: ,,, | Performed by: OBSTETRICS & GYNECOLOGY

## 2021-02-26 PROCEDURE — 27201423 OPTIME MED/SURG SUP & DEVICES STERILE SUPPLY: Performed by: OBSTETRICS & GYNECOLOGY

## 2021-02-26 PROCEDURE — 71000033 HC RECOVERY, INTIAL HOUR: Performed by: OBSTETRICS & GYNECOLOGY

## 2021-02-26 PROCEDURE — 36000706: Performed by: OBSTETRICS & GYNECOLOGY

## 2021-02-26 PROCEDURE — 25000003 PHARM REV CODE 250: Performed by: OBSTETRICS & GYNECOLOGY

## 2021-02-26 PROCEDURE — 63600175 PHARM REV CODE 636 W HCPCS

## 2021-02-26 PROCEDURE — 63600175 PHARM REV CODE 636 W HCPCS: Performed by: OBSTETRICS & GYNECOLOGY

## 2021-02-26 PROCEDURE — 63600175 PHARM REV CODE 636 W HCPCS: Performed by: ANESTHESIOLOGY

## 2021-02-26 PROCEDURE — 71000016 HC POSTOP RECOV ADDL HR: Performed by: OBSTETRICS & GYNECOLOGY

## 2021-02-26 PROCEDURE — 25000003 PHARM REV CODE 250: Performed by: ANESTHESIOLOGY

## 2021-02-26 PROCEDURE — 25000003 PHARM REV CODE 250: Performed by: NURSE ANESTHETIST, CERTIFIED REGISTERED

## 2021-02-26 PROCEDURE — 58300 INSERT INTRAUTERINE DEVICE: CPT | Mod: ,,, | Performed by: OBSTETRICS & GYNECOLOGY

## 2021-02-26 PROCEDURE — 37000009 HC ANESTHESIA EA ADD 15 MINS: Performed by: OBSTETRICS & GYNECOLOGY

## 2021-02-26 PROCEDURE — 36000707: Performed by: OBSTETRICS & GYNECOLOGY

## 2021-02-26 PROCEDURE — 63600175 PHARM REV CODE 636 W HCPCS: Performed by: NURSE ANESTHETIST, CERTIFIED REGISTERED

## 2021-02-26 PROCEDURE — 58300 PR INSERT INTRAUTERINE DEVICE: ICD-10-PCS | Mod: ,,, | Performed by: OBSTETRICS & GYNECOLOGY

## 2021-02-26 PROCEDURE — 37000008 HC ANESTHESIA 1ST 15 MINUTES: Performed by: OBSTETRICS & GYNECOLOGY

## 2021-02-26 PROCEDURE — 71000015 HC POSTOP RECOV 1ST HR: Performed by: OBSTETRICS & GYNECOLOGY

## 2021-02-26 PROCEDURE — 88300 SURGICAL PATH GROSS: CPT | Performed by: PATHOLOGY

## 2021-02-26 RX ORDER — IBUPROFEN 600 MG/1
600 TABLET ORAL EVERY 6 HOURS PRN
Status: CANCELLED | OUTPATIENT
Start: 2021-02-26

## 2021-02-26 RX ORDER — ACETAMINOPHEN 500 MG
1000 TABLET ORAL
Status: COMPLETED | OUTPATIENT
Start: 2021-02-26 | End: 2021-02-26

## 2021-02-26 RX ORDER — MEPERIDINE HYDROCHLORIDE 25 MG/ML
12.5 INJECTION INTRAMUSCULAR; INTRAVENOUS; SUBCUTANEOUS ONCE AS NEEDED
Status: DISCONTINUED | OUTPATIENT
Start: 2021-02-26 | End: 2021-02-26 | Stop reason: HOSPADM

## 2021-02-26 RX ORDER — DEXTROSE, SODIUM CHLORIDE, SODIUM LACTATE, POTASSIUM CHLORIDE, AND CALCIUM CHLORIDE 5; .6; .31; .03; .02 G/100ML; G/100ML; G/100ML; G/100ML; G/100ML
INJECTION, SOLUTION INTRAVENOUS CONTINUOUS
Status: ACTIVE | OUTPATIENT
Start: 2021-02-26

## 2021-02-26 RX ORDER — OXYCODONE HYDROCHLORIDE 5 MG/1
10 TABLET ORAL EVERY 4 HOURS PRN
Status: CANCELLED | OUTPATIENT
Start: 2021-02-26

## 2021-02-26 RX ORDER — AMOXICILLIN 250 MG
1 CAPSULE ORAL 2 TIMES DAILY
Status: CANCELLED | OUTPATIENT
Start: 2021-02-26

## 2021-02-26 RX ORDER — PROPOFOL 10 MG/ML
VIAL (ML) INTRAVENOUS
Status: DISCONTINUED | OUTPATIENT
Start: 2021-02-26 | End: 2021-02-26

## 2021-02-26 RX ORDER — ONDANSETRON 8 MG/1
8 TABLET, ORALLY DISINTEGRATING ORAL EVERY 8 HOURS PRN
Status: DISCONTINUED | OUTPATIENT
Start: 2021-02-26 | End: 2021-02-26 | Stop reason: HOSPADM

## 2021-02-26 RX ORDER — ONDANSETRON 2 MG/ML
INJECTION INTRAMUSCULAR; INTRAVENOUS
Status: DISCONTINUED | OUTPATIENT
Start: 2021-02-26 | End: 2021-02-26

## 2021-02-26 RX ORDER — OXYCODONE HYDROCHLORIDE 5 MG/1
5 TABLET ORAL
Status: DISCONTINUED | OUTPATIENT
Start: 2021-02-26 | End: 2021-02-26 | Stop reason: HOSPADM

## 2021-02-26 RX ORDER — FENTANYL CITRATE 50 UG/ML
INJECTION, SOLUTION INTRAMUSCULAR; INTRAVENOUS
Status: DISCONTINUED | OUTPATIENT
Start: 2021-02-26 | End: 2021-02-26

## 2021-02-26 RX ORDER — ENOXAPARIN SODIUM 100 MG/ML
40 INJECTION SUBCUTANEOUS
Status: DISCONTINUED | OUTPATIENT
Start: 2021-02-26 | End: 2021-02-26

## 2021-02-26 RX ORDER — KETOROLAC TROMETHAMINE 30 MG/ML
INJECTION, SOLUTION INTRAMUSCULAR; INTRAVENOUS
Status: DISCONTINUED | OUTPATIENT
Start: 2021-02-26 | End: 2021-02-26

## 2021-02-26 RX ORDER — LIDOCAINE HYDROCHLORIDE 20 MG/ML
INJECTION INTRAVENOUS
Status: DISCONTINUED | OUTPATIENT
Start: 2021-02-26 | End: 2021-02-26

## 2021-02-26 RX ORDER — SODIUM CHLORIDE, SODIUM LACTATE, POTASSIUM CHLORIDE, CALCIUM CHLORIDE 600; 310; 30; 20 MG/100ML; MG/100ML; MG/100ML; MG/100ML
INJECTION, SOLUTION INTRAVENOUS CONTINUOUS
Status: DISCONTINUED | OUTPATIENT
Start: 2021-02-26 | End: 2021-02-26 | Stop reason: HOSPADM

## 2021-02-26 RX ORDER — ENOXAPARIN SODIUM 100 MG/ML
40 INJECTION SUBCUTANEOUS
Status: DISCONTINUED | OUTPATIENT
Start: 2021-02-26 | End: 2021-02-26 | Stop reason: HOSPADM

## 2021-02-26 RX ORDER — DIPHENHYDRAMINE HCL 25 MG
25 CAPSULE ORAL EVERY 4 HOURS PRN
Status: CANCELLED | OUTPATIENT
Start: 2021-02-26

## 2021-02-26 RX ORDER — ENOXAPARIN SODIUM 100 MG/ML
40 INJECTION SUBCUTANEOUS DAILY
Qty: 0.8 ML | Refills: 0 | Status: SHIPPED | OUTPATIENT
Start: 2021-02-26 | End: 2021-02-28

## 2021-02-26 RX ORDER — DEXTROSE MONOHYDRATE AND SODIUM CHLORIDE 5; .45 G/100ML; G/100ML
INJECTION, SOLUTION INTRAVENOUS CONTINUOUS
Status: CANCELLED | OUTPATIENT
Start: 2021-02-26

## 2021-02-26 RX ORDER — LIDOCAINE HYDROCHLORIDE 10 MG/ML
0.5 INJECTION, SOLUTION EPIDURAL; INFILTRATION; INTRACAUDAL; PERINEURAL ONCE
Status: DISCONTINUED | OUTPATIENT
Start: 2021-02-26 | End: 2021-02-26 | Stop reason: HOSPADM

## 2021-02-26 RX ORDER — DIPHENHYDRAMINE HYDROCHLORIDE 50 MG/ML
25 INJECTION INTRAMUSCULAR; INTRAVENOUS EVERY 6 HOURS PRN
Status: DISCONTINUED | OUTPATIENT
Start: 2021-02-26 | End: 2021-02-26 | Stop reason: HOSPADM

## 2021-02-26 RX ORDER — MIDAZOLAM HYDROCHLORIDE 1 MG/ML
INJECTION INTRAMUSCULAR; INTRAVENOUS
Status: DISCONTINUED | OUTPATIENT
Start: 2021-02-26 | End: 2021-02-26

## 2021-02-26 RX ORDER — DEXAMETHASONE SODIUM PHOSPHATE 4 MG/ML
INJECTION, SOLUTION INTRA-ARTICULAR; INTRALESIONAL; INTRAMUSCULAR; INTRAVENOUS; SOFT TISSUE
Status: DISCONTINUED | OUTPATIENT
Start: 2021-02-26 | End: 2021-02-26

## 2021-02-26 RX ORDER — OXYCODONE HYDROCHLORIDE 5 MG/1
5 TABLET ORAL EVERY 4 HOURS PRN
Status: CANCELLED | OUTPATIENT
Start: 2021-02-26

## 2021-02-26 RX ORDER — HYDROMORPHONE HYDROCHLORIDE 2 MG/ML
0.4 INJECTION, SOLUTION INTRAMUSCULAR; INTRAVENOUS; SUBCUTANEOUS EVERY 5 MIN PRN
Status: DISCONTINUED | OUTPATIENT
Start: 2021-02-26 | End: 2021-02-26 | Stop reason: HOSPADM

## 2021-02-26 RX ORDER — ONDANSETRON 2 MG/ML
4 INJECTION INTRAMUSCULAR; INTRAVENOUS DAILY PRN
Status: DISCONTINUED | OUTPATIENT
Start: 2021-02-26 | End: 2021-02-26 | Stop reason: HOSPADM

## 2021-02-26 RX ORDER — SODIUM CHLORIDE 0.9 % (FLUSH) 0.9 %
3 SYRINGE (ML) INJECTION
Status: DISCONTINUED | OUTPATIENT
Start: 2021-02-26 | End: 2021-02-26 | Stop reason: HOSPADM

## 2021-02-26 RX ADMIN — ENOXAPARIN SODIUM 40 MG: 40 INJECTION SUBCUTANEOUS at 07:02

## 2021-02-26 RX ADMIN — FENTANYL CITRATE 25 MCG: 50 INJECTION, SOLUTION INTRAMUSCULAR; INTRAVENOUS at 08:02

## 2021-02-26 RX ADMIN — FENTANYL CITRATE 75 MCG: 50 INJECTION, SOLUTION INTRAMUSCULAR; INTRAVENOUS at 08:02

## 2021-02-26 RX ADMIN — DOXYCYCLINE 100 MG: 100 INJECTION, POWDER, LYOPHILIZED, FOR SOLUTION INTRAVENOUS at 08:02

## 2021-02-26 RX ADMIN — SODIUM CHLORIDE, SODIUM LACTATE, POTASSIUM CHLORIDE, AND CALCIUM CHLORIDE: 600; 310; 30; 20 INJECTION, SOLUTION INTRAVENOUS at 07:02

## 2021-02-26 RX ADMIN — MIDAZOLAM HYDROCHLORIDE 2 MG: 1 INJECTION, SOLUTION INTRAMUSCULAR; INTRAVENOUS at 08:02

## 2021-02-26 RX ADMIN — LIDOCAINE HYDROCHLORIDE 100 MG: 20 INJECTION, SOLUTION INTRAVENOUS at 08:02

## 2021-02-26 RX ADMIN — PROPOFOL 200 MG: 10 INJECTION, EMULSION INTRAVENOUS at 08:02

## 2021-02-26 RX ADMIN — KETOROLAC TROMETHAMINE 30 MG: 30 INJECTION, SOLUTION INTRAMUSCULAR; INTRAVENOUS at 08:02

## 2021-02-26 RX ADMIN — DEXAMETHASONE SODIUM PHOSPHATE 8 MG: 4 INJECTION, SOLUTION INTRAMUSCULAR; INTRAVENOUS at 08:02

## 2021-02-26 RX ADMIN — ONDANSETRON HYDROCHLORIDE 4 MG: 2 INJECTION INTRAMUSCULAR; INTRAVENOUS at 08:02

## 2021-02-26 RX ADMIN — ACETAMINOPHEN 1000 MG: 500 TABLET ORAL at 07:02

## 2021-03-02 LAB
FINAL PATHOLOGIC DIAGNOSIS: NORMAL
GROSS: NORMAL
Lab: NORMAL

## 2021-04-16 ENCOUNTER — PATIENT MESSAGE (OUTPATIENT)
Dept: RESEARCH | Facility: HOSPITAL | Age: 31
End: 2021-04-16

## 2021-08-25 ENCOUNTER — TELEPHONE (OUTPATIENT)
Dept: INTERNAL MEDICINE | Facility: CLINIC | Age: 31
End: 2021-08-25

## 2021-10-22 ENCOUNTER — OFFICE VISIT (OUTPATIENT)
Dept: OBSTETRICS AND GYNECOLOGY | Facility: CLINIC | Age: 31
End: 2021-10-22
Attending: OBSTETRICS & GYNECOLOGY
Payer: COMMERCIAL

## 2021-10-22 VITALS — BODY MASS INDEX: 37.55 KG/M2 | WEIGHT: 198.88 LBS | HEIGHT: 61 IN

## 2021-10-22 DIAGNOSIS — Z01.419 ENCOUNTER FOR GYNECOLOGICAL EXAMINATION (GENERAL) (ROUTINE) WITHOUT ABNORMAL FINDINGS: Primary | ICD-10-CM

## 2021-10-22 PROCEDURE — 99999 PR PBB SHADOW E&M-EST. PATIENT-LVL III: ICD-10-PCS | Mod: PBBFAC,,, | Performed by: OBSTETRICS & GYNECOLOGY

## 2021-10-22 PROCEDURE — 99395 PR PREVENTIVE VISIT,EST,18-39: ICD-10-PCS | Mod: S$GLB,,, | Performed by: OBSTETRICS & GYNECOLOGY

## 2021-10-22 PROCEDURE — 99999 PR PBB SHADOW E&M-EST. PATIENT-LVL III: CPT | Mod: PBBFAC,,, | Performed by: OBSTETRICS & GYNECOLOGY

## 2021-10-22 PROCEDURE — 99395 PREV VISIT EST AGE 18-39: CPT | Mod: S$GLB,,, | Performed by: OBSTETRICS & GYNECOLOGY

## 2021-10-22 RX ORDER — ESCITALOPRAM OXALATE 10 MG/1
20 TABLET ORAL DAILY
COMMUNITY
End: 2023-03-06

## 2021-10-22 RX ORDER — VORTIOXETINE 10 MG/1
1 TABLET, FILM COATED ORAL DAILY
COMMUNITY
Start: 2021-08-29 | End: 2022-05-29

## 2021-10-22 RX ORDER — VORTIOXETINE 5 MG/1
5 TABLET, FILM COATED ORAL
COMMUNITY
End: 2022-05-29

## 2021-12-16 ENCOUNTER — PATIENT OUTREACH (OUTPATIENT)
Dept: ADMINISTRATIVE | Facility: HOSPITAL | Age: 31
End: 2021-12-16
Payer: COMMERCIAL

## 2022-03-13 ENCOUNTER — OFFICE VISIT (OUTPATIENT)
Dept: URGENT CARE | Facility: CLINIC | Age: 32
End: 2022-03-13
Payer: COMMERCIAL

## 2022-03-13 VITALS
RESPIRATION RATE: 16 BRPM | BODY MASS INDEX: 37.38 KG/M2 | TEMPERATURE: 98 F | HEIGHT: 61 IN | WEIGHT: 198 LBS | HEART RATE: 99 BPM | DIASTOLIC BLOOD PRESSURE: 77 MMHG | OXYGEN SATURATION: 97 % | SYSTOLIC BLOOD PRESSURE: 108 MMHG

## 2022-03-13 DIAGNOSIS — J02.9 SORE THROAT: ICD-10-CM

## 2022-03-13 DIAGNOSIS — J02.9 VIRAL PHARYNGITIS: Primary | ICD-10-CM

## 2022-03-13 LAB
CTP QC/QA: YES
MOLECULAR STREP A: NEGATIVE

## 2022-03-13 PROCEDURE — 87651 STREP A DNA AMP PROBE: CPT | Mod: QW,S$GLB,, | Performed by: NURSE PRACTITIONER

## 2022-03-13 PROCEDURE — 99213 PR OFFICE/OUTPT VISIT, EST, LEVL III, 20-29 MIN: ICD-10-PCS | Mod: S$GLB,,, | Performed by: NURSE PRACTITIONER

## 2022-03-13 PROCEDURE — 87651 POCT STREP A MOLECULAR: ICD-10-PCS | Mod: QW,S$GLB,, | Performed by: NURSE PRACTITIONER

## 2022-03-13 PROCEDURE — 99213 OFFICE O/P EST LOW 20 MIN: CPT | Mod: S$GLB,,, | Performed by: NURSE PRACTITIONER

## 2022-03-13 NOTE — PROGRESS NOTES
"Subjective:       Patient ID: Rosa Limon is a 32 y.o. female.    Vitals:  height is 5' 1" (1.549 m) and weight is 89.8 kg (198 lb). Her temperature is 98 °F (36.7 °C). Her blood pressure is 108/77 and her pulse is 99. Her respiration is 16 and oxygen saturation is 97%.     Chief Complaint: Sore Throat    This is a 32 y.o. female who presents today with a chief complaint of sore throat started yesterday, patient reports she recently came back from vacation from [place], patient reports she is vaccinated, patient reports she did the at home test yesterday at home and it came back negative, denies fever, body aches or chills, denies wheezing or shortness of breath, denies nausea, vomiting, diarrhea or abdominal pain, denies chest pain or dizziness positional lightheadedness, denies loss of taste or smell, or any other symptoms          Sore Throat   This is a new problem. The current episode started in the past 7 days. The problem has been unchanged. Neither side of throat is experiencing more pain than the other. There has been no fever. The pain is at a severity of 9/10. The pain is severe. Associated symptoms include coughing, swollen glands and trouble swallowing. Pertinent negatives include no abdominal pain, shortness of breath or vomiting. She has tried NSAIDs (ibuprofen) for the symptoms. The treatment provided mild relief.       Constitution: Negative for chills, sweating, fatigue and fever.   HENT: Positive for sore throat and trouble swallowing.    Respiratory: Positive for cough. Negative for chest tightness, sputum production, shortness of breath and wheezing.    Gastrointestinal: Negative for abdominal pain, nausea, vomiting and constipation.   Allergic/Immunologic: Negative for environmental allergies and seasonal allergies.   Neurological: Negative for dizziness and light-headedness.       Objective:      Physical Exam   Constitutional: She is oriented to person, place, and time. She " appears well-developed. She is cooperative.  Non-toxic appearance. She does not appear ill. No distress.   HENT:   Head: Normocephalic and atraumatic.   Ears:   Right Ear: Hearing, tympanic membrane, external ear and ear canal normal. Tympanic membrane is not injected. No middle ear effusion.   Left Ear: Hearing, tympanic membrane, external ear and ear canal normal. Tympanic membrane is not injected.  No middle ear effusion.   Nose: Nose normal. No mucosal edema, rhinorrhea or nasal deformity. No epistaxis. Right sinus exhibits no maxillary sinus tenderness and no frontal sinus tenderness. Left sinus exhibits no maxillary sinus tenderness and no frontal sinus tenderness.   Mouth/Throat: Uvula is midline, oropharynx is clear and moist and mucous membranes are normal. No trismus in the jaw. Normal dentition. No uvula swelling. No oropharyngeal exudate, posterior oropharyngeal edema, posterior oropharyngeal erythema, tonsillar abscesses or cobblestoning.   Eyes: Conjunctivae and lids are normal. No scleral icterus.   Neck: Trachea normal and phonation normal. Neck supple. No edema present. No erythema present. No neck rigidity present.   Cardiovascular: Normal rate, regular rhythm, normal heart sounds and normal pulses.   Pulmonary/Chest: Effort normal and breath sounds normal. No respiratory distress. She has no decreased breath sounds. She has no rhonchi.   Abdominal: Normal appearance.   Musculoskeletal: Normal range of motion.         General: No deformity. Normal range of motion.   Lymphadenopathy:     She has no cervical adenopathy.   Neurological: She is alert and oriented to person, place, and time. She exhibits normal muscle tone. Coordination normal.   Skin: Skin is warm, dry, intact, not diaphoretic and not pale.   Psychiatric: Her speech is normal and behavior is normal. Judgment and thought content normal.   Nursing note and vitals reviewed.        Results for orders placed or performed in visit on  03/13/22   POCT Strep A, Molecular   Result Value Ref Range    Molecular Strep A, POC Negative Negative     Acceptable Yes          Patient in no acute distress.  Vitals reassuring.  Discussed results/diagnosis/plan in depth with patient in clinic. Strict precautions given to patient to monitor for worsening signs and symptoms. Advised to follow up with primary.All questions answered. Strict ER precautions given. If your symptoms worsens or fail to improve you should go to the Emergency Room. Discharge and follow-up instructions given verbally/printed. Discharge and follow-up instructions discussed with the patient who expressed understanding and willingness to comply with my recommendations.Patient voiced understanding and in agreement with current treatment plan.     Please be advised this text was dictated with [employer] software and may contain errors due to translation.      Assessment:       1. Viral pharyngitis    2. Sore throat          Plan:         Viral pharyngitis    Sore throat  -     POCT Strep A, Molecular    Other orders  -     (Magic mouthwash) 1:1:1 diphenhydramine(Benadryl) 12.5mg/5ml liq, aluminum & magnesium hydroxide-simethicone (Maalox), LIDOcaine viscous 2%; Swish and spit 10 mLs every 4 (four) hours as needed (sore throat). for sore throat  Dispense: 360 mL; Refill: 0           Medical Decision Making:   Differential Diagnosis:   Differential diagnoses included but not limited to acute URI, allergic or seasonal rhinitis, viral pharyngitis, bacterial pharyngitis or tonsillitis, tonsillitis, influenza, acute bacterial rhinosinusitis  Urgent Care Management:  Patient in no acute distress.  Vitals reassuring.  Negative strep test results discussed with patient detail.  Patient tested negative for COVID-19 at home tests yesterday.  Physical examination benign.  No lymphadenopathy.  Lungs CTA.  Magic mouthwash prescribed for supportive treatment.  Over-the-counter medications  discussed with patient in depth.Patient voiced understanding and in agreement with current treatment plan.         Patient Instructions   PLEASE READ YOUR DISCHARGE INSTRUCTIONS ENTIRELY AS IT CONTAINS IMPORTANT INFORMATION.      Please drink plenty of fluids.    Please get plenty of rest.    Please return here or go to the Emergency Department for any concerns or worsening of condition.    Please take an over the counter antihistamine medication (allegra/Claritin/Zyrtec) of your choice as directed.    Try an over the counter decongestant like Mucinex D or Sudafed. You buy this behind the pharmacy counter    If you do have Hypertension or palpitations, it is safe to take Coricidin HBP for relief of sinus symptoms.    If not allergic, please take over the counter Tylenol (Acetaminophen) and/or Motrin (Ibuprofen) as directed for control of pain and/or fever.  Please follow up with your primary care doctor or specialist as needed.    Sore throat recommendations: Warm fluids, warm salt water gargles, throat lozenges, tea, honey, soup, rest, hydration.    Use over the counter flonase: one spray each nostril twice daily OR two sprays each nostril once daily.     If you  smoke, please stop smoking.      Please return or see your primary care doctor if you develop new or worsening symptoms.     Please arrange follow up with your primary medical clinic as soon as possible. You must understand that you've received an Urgent Care treatment only and that you may be released before all of your medical problems are known or treated. You, the patient, will arrange for follow up as instructed. If your symptoms worsen or fail to improve you should go to the Emergency Room.

## 2022-05-03 ENCOUNTER — OFFICE VISIT (OUTPATIENT)
Dept: URGENT CARE | Facility: CLINIC | Age: 32
End: 2022-05-03
Payer: COMMERCIAL

## 2022-05-03 VITALS
TEMPERATURE: 98 F | SYSTOLIC BLOOD PRESSURE: 128 MMHG | RESPIRATION RATE: 16 BRPM | HEART RATE: 82 BPM | OXYGEN SATURATION: 98 % | BODY MASS INDEX: 37.38 KG/M2 | DIASTOLIC BLOOD PRESSURE: 89 MMHG | WEIGHT: 198 LBS | HEIGHT: 61 IN

## 2022-05-03 DIAGNOSIS — M54.9 BACK PAIN, UNSPECIFIED BACK LOCATION, UNSPECIFIED BACK PAIN LATERALITY, UNSPECIFIED CHRONICITY: Primary | ICD-10-CM

## 2022-05-03 LAB
B-HCG UR QL: NEGATIVE
BILIRUB UR QL STRIP: NEGATIVE
CTP QC/QA: YES
GLUCOSE UR QL STRIP: NEGATIVE
KETONES UR QL STRIP: NEGATIVE
LEUKOCYTE ESTERASE UR QL STRIP: NEGATIVE
PH, POC UA: 5 (ref 5–8)
POC BLOOD, URINE: POSITIVE
POC NITRATES, URINE: NEGATIVE
PROT UR QL STRIP: NEGATIVE
SP GR UR STRIP: 1.02 (ref 1–1.03)
UROBILINOGEN UR STRIP-ACNC: NORMAL (ref 0.1–1.1)

## 2022-05-03 PROCEDURE — 81025 URINE PREGNANCY TEST: CPT | Mod: S$GLB,,, | Performed by: NURSE PRACTITIONER

## 2022-05-03 PROCEDURE — 87086 URINE CULTURE/COLONY COUNT: CPT | Performed by: NURSE PRACTITIONER

## 2022-05-03 PROCEDURE — 81025 POCT URINE PREGNANCY: ICD-10-PCS | Mod: S$GLB,,, | Performed by: NURSE PRACTITIONER

## 2022-05-03 PROCEDURE — 99213 PR OFFICE/OUTPT VISIT, EST, LEVL III, 20-29 MIN: ICD-10-PCS | Mod: S$GLB,,, | Performed by: NURSE PRACTITIONER

## 2022-05-03 PROCEDURE — 81003 URINALYSIS AUTO W/O SCOPE: CPT | Mod: QW,S$GLB,, | Performed by: NURSE PRACTITIONER

## 2022-05-03 PROCEDURE — 99213 OFFICE O/P EST LOW 20 MIN: CPT | Mod: S$GLB,,, | Performed by: NURSE PRACTITIONER

## 2022-05-03 PROCEDURE — 81003 POCT URINALYSIS, DIPSTICK, AUTOMATED, W/O SCOPE: ICD-10-PCS | Mod: QW,S$GLB,, | Performed by: NURSE PRACTITIONER

## 2022-05-03 RX ORDER — NAPROXEN 500 MG/1
500 TABLET ORAL 2 TIMES DAILY PRN
Qty: 15 TABLET | Refills: 0 | Status: SHIPPED | OUTPATIENT
Start: 2022-05-03 | End: 2022-05-03 | Stop reason: CLARIF

## 2022-05-03 NOTE — PROGRESS NOTES
"Subjective:       Patient ID: Rosa Limon is a 32 y.o. female.    Vitals:  height is 5' 1" (1.549 m) and weight is 89.8 kg (198 lb). Her temperature is 98.2 °F (36.8 °C). Her blood pressure is 128/89 and her pulse is 82. Her respiration is 16 and oxygen saturation is 98%.     Chief Complaint: Back Pain    This is a 32 y.o. female who presents today with a chief complaint of  right side back pain  last night, patient reports she had pain last night which resolved on its own later on, after taking ibuprofen, patient reports currently her pain is 2/10, Denies urinary frequency, urgency, dysuria or hematuria denies flank pain, denies any trauma fall or injury, denies numbness or tingling,denies fever, body aches or chills, denies cough, wheezing or shortness of breath, denies nausea, vomiting, diarrhea or abdominal pain, denies chest pain or dizziness positional lightheadedness, denies sore throat or trouble swallowing, denies loss of taste or smell, or any other symptoms      Back Pain  This is a new problem. The current episode started yesterday. The problem occurs constantly. The problem is unchanged. The quality of the pain is described as aching and burning. The pain does not radiate. The pain is at a severity of 9/10. The pain is severe. The pain is the same all the time. The symptoms are aggravated by position and bending. Stiffness is present all day. Pertinent negatives include no abdominal pain, chest pain or fever. She has tried nothing for the symptoms. The treatment provided no relief.       Constitution: Negative for chills, sweating, fatigue and fever.   HENT: Negative for postnasal drip, sinus pain, sinus pressure and sore throat.    Cardiovascular: Negative for chest pain.   Respiratory: Negative for chest tightness, cough, sputum production, shortness of breath and wheezing.    Gastrointestinal: Negative for abdominal pain, nausea, vomiting, constipation and diarrhea.   Musculoskeletal: " Positive for back pain.   Allergic/Immunologic: Negative for environmental allergies and seasonal allergies.   Neurological: Negative for dizziness and light-headedness.       Objective:      Physical Exam   Constitutional: She is oriented to person, place, and time. She appears well-developed. She is cooperative. No distress.   HENT:   Head: Normocephalic and atraumatic.   Nose: Nose normal.   Mouth/Throat: Oropharynx is clear and moist and mucous membranes are normal.   Eyes: Conjunctivae and lids are normal.   Neck: Trachea normal and phonation normal. Neck supple.   Cardiovascular: Normal rate, regular rhythm, normal heart sounds and normal pulses.   Pulmonary/Chest: Effort normal and breath sounds normal.   Abdominal: Normal appearance and bowel sounds are normal. She exhibits no abdominal bruit, no pulsatile midline mass and no mass. Soft. There is no abdominal tenderness. There is no left CVA tenderness and no right CVA tenderness.      Comments: NO CVA tenderness   Musculoskeletal:         General: No deformity.      Lumbar back: Normal.      Comments: No midline spine tenderness.  Full range of motion bilaterally with 5/5 strength  Able to ambulate with smooth rhythmic gait       Neurological: She is alert and oriented to person, place, and time. She has normal strength and normal reflexes. No sensory deficit.   Skin: Skin is warm, dry, intact and not diaphoretic.   Psychiatric: Her speech is normal and behavior is normal. Judgment and thought content normal.   Nursing note and vitals reviewed.        Results for orders placed or performed in visit on 05/03/22   POCT Urinalysis, Dipstick, Automated, W/O Scope   Result Value Ref Range    POC Blood, Urine Positive (A) Negative    POC Bilirubin, Urine Negative Negative    POC Urobilinogen, Urine normal 0.1 - 1.1    POC Ketones, Urine Negative Negative    POC Protein, Urine Negative Negative    POC Nitrates, Urine Negative Negative    POC Glucose, Urine  Negative Negative    pH, UA 5.0 5 - 8    POC Specific Gravity, Urine 1.025 1.003 - 1.029    POC Leukocytes, Urine Negative Negative   POCT urine pregnancy   Result Value Ref Range    POC Preg Test, Ur Negative Negative     Acceptable Yes        Patient in no acute distress.  Vitals reassuring.  Discussed results/diagnosis/plan in depth with patient in clinic. Strict precautions given to patient to monitor for worsening signs and symptoms. Advised to follow up with primary.All questions answered. Strict ER precautions given. If your symptoms worsens or fail to improve you should go to the Emergency Room. Discharge and follow-up instructions given verbally/printed. Discharge and follow-up instructions discussed with the patient who expressed understanding and willingness to comply with my recommendations.Patient voiced understanding and in agreement with current treatment plan.     Please be advised this text was dictated with [employer] software and may contain errors due to translation.      Assessment:       1. Back pain, unspecified back location, unspecified back pain laterality, unspecified chronicity          Plan:         Back pain, unspecified back location, unspecified back pain laterality, unspecified chronicity  -     POCT Urinalysis, Dipstick, Automated, W/O Scope  -     POCT urine pregnancy  -     Discontinue: naproxen (NAPROSYN) 500 MG tablet; Take 1 tablet (500 mg total) by mouth 2 (two) times daily as needed (For pain, take with meals).  Dispense: 15 tablet; Refill: 0  -     Urine culture           Medical Decision Making:   Differential Diagnosis:   Differential diagnoses included but not limited to radicular low back pain, lumbar radiculopathy, lumbar spinal stenosis, cauda equina syndrome, UTI, pyelonephritis  Clinical Tests:   Lab Tests: Reviewed  Urgent Care Management:   Patient in no acute distress.  Vitals reassuring.  Urinalysis with trace blood.  No nitrates or leukocytes.  Urine  culture sent off.  No UTI symptoms.  Will call back with urine culture results in 3-5 days.      Physical examination benign.  No midline lumbar spine tenderness.Proper hydration advised.  I reiterated the importance of further evaluation if no improvement symptoms.Patient voiced understanding and in agreement with current treatment plan.           Patient Instructions     Alternate heat and ice for first 48 hours then  apply heat. You may do gently stretching if tolerable.    Moist warm compresss to area several times daily.  May use a heating pad on LOW to provide heat over a towel which was dampended with warm water. DO NOT FALL ASLEEP WITH HEATING PAD ON.  Do not stay in one position to long.  When sleeping on your back place a pillow under knees to reduce tension on back.  If sleeping on your side, place pillow between knees to keep spine in better alinement.  Wear supportive shoes such as tennis shoes for support of the lower back.  Take any medication as directed.    If you were not prescribed an anti-inflammatory medication, and if you do not have any history of stomach/intestinal ulcers, or kidney disease, or are not taking a blood thinner such as Coumadin, Plavix, Pradaxa, Eloquis, or Xaralta for example, it is OK to take over the counter Ibuprofen or Advil or Motrin or Aleve as directed.  Do not take these medications on an empty stomach.    If you were prescribed a narcotic medication, do not drive or operate heavy equipment or machinery while taking these medications.    If you lose control of your bowel and/or bladder, please go to the nearest Emergency Department immediately.  If you lose sensation in between your legs by your genitalia and/or rectum, please go to the nearest Emergency Department immediately.  If you lose control or sensation of any extremity, please go to the nearest Emergency Department immediately.

## 2022-05-03 NOTE — PATIENT INSTRUCTIONS
Alternate heat and ice for first 48 hours then  apply heat. You may do gently stretching if tolerable.    Moist warm compresss to area several times daily.  May use a heating pad on LOW to provide heat over a towel which was dampended with warm water. DO NOT FALL ASLEEP WITH HEATING PAD ON.  Do not stay in one position to long.  When sleeping on your back place a pillow under knees to reduce tension on back.  If sleeping on your side, place pillow between knees to keep spine in better alinement.  Wear supportive shoes such as tennis shoes for support of the lower back.  Take any medication as directed.    If you were not prescribed an anti-inflammatory medication, and if you do not have any history of stomach/intestinal ulcers, or kidney disease, or are not taking a blood thinner such as Coumadin, Plavix, Pradaxa, Eloquis, or Xaralta for example, it is OK to take over the counter Ibuprofen or Advil or Motrin or Aleve as directed.  Do not take these medications on an empty stomach.    If you were prescribed a narcotic medication, do not drive or operate heavy equipment or machinery while taking these medications.    If you lose control of your bowel and/or bladder, please go to the nearest Emergency Department immediately.  If you lose sensation in between your legs by your genitalia and/or rectum, please go to the nearest Emergency Department immediately.  If you lose control or sensation of any extremity, please go to the nearest Emergency Department immediately.

## 2022-05-05 ENCOUNTER — TELEPHONE (OUTPATIENT)
Dept: URGENT CARE | Facility: CLINIC | Age: 32
End: 2022-05-05
Payer: COMMERCIAL

## 2022-05-05 LAB — BACTERIA UR CULT: NORMAL

## 2022-05-08 ENCOUNTER — TELEPHONE (OUTPATIENT)
Dept: URGENT CARE | Facility: CLINIC | Age: 32
End: 2022-05-08
Payer: COMMERCIAL

## 2022-05-08 NOTE — TELEPHONE ENCOUNTER
Attempted to call patient to discuss negative urine culture results.  No answer.  Left voicemail to call back.

## 2022-05-11 ENCOUNTER — TELEPHONE (OUTPATIENT)
Dept: URGENT CARE | Facility: CLINIC | Age: 32
End: 2022-05-11
Payer: COMMERCIAL

## 2022-05-11 NOTE — TELEPHONE ENCOUNTER
Attempted to call patient to discuss negative urine culture result, but was unable to reach. Left message to call clinic.      ----- Message from Yunior Rizvi NP sent at 5/8/2022 10:01 AM CDT -----  Please call patient and notify about negative UC results. Attempted to call patient to discuss negative urine culture results.  No answer.  Left voicemail to call back.

## 2022-05-19 ENCOUNTER — HOSPITAL ENCOUNTER (EMERGENCY)
Facility: HOSPITAL | Age: 32
Discharge: HOME | End: 2022-05-19
Attending: EMERGENCY MEDICINE
Payer: COMMERCIAL

## 2022-05-19 VITALS
RESPIRATION RATE: 20 BRPM | SYSTOLIC BLOOD PRESSURE: 124 MMHG | HEART RATE: 95 BPM | DIASTOLIC BLOOD PRESSURE: 90 MMHG | OXYGEN SATURATION: 98 % | TEMPERATURE: 98 F

## 2022-05-19 DIAGNOSIS — R10.9 RIGHT FLANK PAIN: ICD-10-CM

## 2022-05-19 DIAGNOSIS — N20.0 KIDNEY STONE: Primary | ICD-10-CM

## 2022-05-19 PROBLEM — D68.52 PROTHROMBIN G20210A MUTATION: Chronic | Status: ACTIVE | Noted: 2017-10-03

## 2022-05-19 LAB
ALBUMIN SERPL BCP-MCNC: 3.5 G/DL (ref 3.5–5.2)
ALP SERPL-CCNC: 79 U/L (ref 55–135)
ALT SERPL W/O P-5'-P-CCNC: 21 U/L (ref 10–44)
ANION GAP SERPL CALC-SCNC: 10 MMOL/L (ref 8–16)
AST SERPL-CCNC: 14 U/L (ref 10–40)
B-HCG UR QL: NEGATIVE
BACTERIA #/AREA URNS AUTO: ABNORMAL /HPF
BASOPHILS # BLD AUTO: 0.06 K/UL (ref 0–0.2)
BASOPHILS NFR BLD: 0.6 % (ref 0–1.9)
BILIRUB SERPL-MCNC: 0.3 MG/DL (ref 0.1–1)
BILIRUB UR QL STRIP: NEGATIVE
BUN SERPL-MCNC: 8 MG/DL (ref 6–20)
CALCIUM SERPL-MCNC: 8.5 MG/DL (ref 8.7–10.5)
CAOX CRY UR QL COMP ASSIST: ABNORMAL
CHLORIDE SERPL-SCNC: 110 MMOL/L (ref 95–110)
CLARITY UR REFRACT.AUTO: ABNORMAL
CO2 SERPL-SCNC: 21 MMOL/L (ref 23–29)
COLOR UR AUTO: YELLOW
CREAT SERPL-MCNC: 0.7 MG/DL (ref 0.5–1.4)
CTP QC/QA: YES
DIFFERENTIAL METHOD: NORMAL
EOSINOPHIL # BLD AUTO: 0.2 K/UL (ref 0–0.5)
EOSINOPHIL NFR BLD: 1.8 % (ref 0–8)
ERYTHROCYTE [DISTWIDTH] IN BLOOD BY AUTOMATED COUNT: 13.2 % (ref 11.5–14.5)
EST. GFR  (AFRICAN AMERICAN): >60 ML/MIN/1.73 M^2
EST. GFR  (NON AFRICAN AMERICAN): >60 ML/MIN/1.73 M^2
GLUCOSE SERPL-MCNC: 159 MG/DL (ref 70–110)
GLUCOSE UR QL STRIP: ABNORMAL
HCT VFR BLD AUTO: 38.8 % (ref 37–48.5)
HGB BLD-MCNC: 13 G/DL (ref 12–16)
HGB UR QL STRIP: ABNORMAL
HYALINE CASTS UR QL AUTO: 0 /LPF
IMM GRANULOCYTES # BLD AUTO: 0.04 K/UL (ref 0–0.04)
IMM GRANULOCYTES NFR BLD AUTO: 0.4 % (ref 0–0.5)
KETONES UR QL STRIP: NEGATIVE
LEUKOCYTE ESTERASE UR QL STRIP: NEGATIVE
LYMPHOCYTES # BLD AUTO: 2.1 K/UL (ref 1–4.8)
LYMPHOCYTES NFR BLD: 23 % (ref 18–48)
MCH RBC QN AUTO: 28.6 PG (ref 27–31)
MCHC RBC AUTO-ENTMCNC: 33.5 G/DL (ref 32–36)
MCV RBC AUTO: 85 FL (ref 82–98)
MICROSCOPIC COMMENT: ABNORMAL
MONOCYTES # BLD AUTO: 0.5 K/UL (ref 0.3–1)
MONOCYTES NFR BLD: 5.7 % (ref 4–15)
NEUTROPHILS # BLD AUTO: 6.3 K/UL (ref 1.8–7.7)
NEUTROPHILS NFR BLD: 68.5 % (ref 38–73)
NITRITE UR QL STRIP: NEGATIVE
NRBC BLD-RTO: 0 /100 WBC
PH UR STRIP: 5 [PH] (ref 5–8)
PLATELET # BLD AUTO: 351 K/UL (ref 150–450)
PMV BLD AUTO: 9.3 FL (ref 9.2–12.9)
POTASSIUM SERPL-SCNC: 4.1 MMOL/L (ref 3.5–5.1)
PROT SERPL-MCNC: 6.4 G/DL (ref 6–8.4)
PROT UR QL STRIP: ABNORMAL
RBC # BLD AUTO: 4.55 M/UL (ref 4–5.4)
RBC #/AREA URNS AUTO: >100 /HPF (ref 0–4)
SODIUM SERPL-SCNC: 141 MMOL/L (ref 136–145)
SP GR UR STRIP: 1.02 (ref 1–1.03)
SQUAMOUS #/AREA URNS AUTO: 5 /HPF
URN SPEC COLLECT METH UR: ABNORMAL
WBC # BLD AUTO: 9.27 K/UL (ref 3.9–12.7)
WBC #/AREA URNS AUTO: 0 /HPF (ref 0–5)

## 2022-05-19 PROCEDURE — 80053 COMPREHEN METABOLIC PANEL: CPT | Performed by: EMERGENCY MEDICINE

## 2022-05-19 PROCEDURE — 99285 PR EMERGENCY DEPT VISIT,LEVEL V: ICD-10-PCS | Mod: ,,, | Performed by: EMERGENCY MEDICINE

## 2022-05-19 PROCEDURE — 96374 THER/PROPH/DIAG INJ IV PUSH: CPT

## 2022-05-19 PROCEDURE — 25000003 PHARM REV CODE 250: Performed by: EMERGENCY MEDICINE

## 2022-05-19 PROCEDURE — 96375 TX/PRO/DX INJ NEW DRUG ADDON: CPT

## 2022-05-19 PROCEDURE — 96361 HYDRATE IV INFUSION ADD-ON: CPT

## 2022-05-19 PROCEDURE — 81001 URINALYSIS AUTO W/SCOPE: CPT | Performed by: EMERGENCY MEDICINE

## 2022-05-19 PROCEDURE — 99285 EMERGENCY DEPT VISIT HI MDM: CPT | Mod: 25

## 2022-05-19 PROCEDURE — 99285 EMERGENCY DEPT VISIT HI MDM: CPT | Mod: ,,, | Performed by: EMERGENCY MEDICINE

## 2022-05-19 PROCEDURE — 63600175 PHARM REV CODE 636 W HCPCS: Performed by: EMERGENCY MEDICINE

## 2022-05-19 PROCEDURE — 81025 URINE PREGNANCY TEST: CPT | Performed by: EMERGENCY MEDICINE

## 2022-05-19 PROCEDURE — 85025 COMPLETE CBC W/AUTO DIFF WBC: CPT | Performed by: EMERGENCY MEDICINE

## 2022-05-19 RX ORDER — KETOROLAC TROMETHAMINE 30 MG/ML
10 INJECTION, SOLUTION INTRAMUSCULAR; INTRAVENOUS
Status: COMPLETED | OUTPATIENT
Start: 2022-05-19 | End: 2022-05-19

## 2022-05-19 RX ORDER — HYDROCODONE BITARTRATE AND ACETAMINOPHEN 5; 325 MG/1; MG/1
1 TABLET ORAL EVERY 6 HOURS PRN
Qty: 12 TABLET | Refills: 0 | Status: SHIPPED | OUTPATIENT
Start: 2022-05-19 | End: 2022-05-22

## 2022-05-19 RX ORDER — ONDANSETRON 4 MG/1
4 TABLET, FILM COATED ORAL EVERY 6 HOURS PRN
Qty: 12 TABLET | Refills: 0 | Status: SHIPPED | OUTPATIENT
Start: 2022-05-19 | End: 2022-08-19

## 2022-05-19 RX ORDER — MORPHINE SULFATE 2 MG/ML
6 INJECTION, SOLUTION INTRAMUSCULAR; INTRAVENOUS
Status: COMPLETED | OUTPATIENT
Start: 2022-05-19 | End: 2022-05-19

## 2022-05-19 RX ORDER — IBUPROFEN 600 MG/1
600 TABLET ORAL EVERY 6 HOURS PRN
Qty: 20 TABLET | Refills: 0 | Status: SHIPPED | OUTPATIENT
Start: 2022-05-19 | End: 2022-05-24

## 2022-05-19 RX ORDER — HYDROMORPHONE HYDROCHLORIDE 1 MG/ML
0.5 INJECTION, SOLUTION INTRAMUSCULAR; INTRAVENOUS; SUBCUTANEOUS
Status: COMPLETED | OUTPATIENT
Start: 2022-05-19 | End: 2022-05-19

## 2022-05-19 RX ORDER — ONDANSETRON 2 MG/ML
4 INJECTION INTRAMUSCULAR; INTRAVENOUS
Status: COMPLETED | OUTPATIENT
Start: 2022-05-19 | End: 2022-05-19

## 2022-05-19 RX ORDER — TAMSULOSIN HYDROCHLORIDE 0.4 MG/1
0.4 CAPSULE ORAL DAILY
Qty: 10 CAPSULE | Refills: 0 | OUTPATIENT
Start: 2022-05-19 | End: 2022-05-26

## 2022-05-19 RX ADMIN — SODIUM CHLORIDE 1000 ML: 0.9 INJECTION, SOLUTION INTRAVENOUS at 03:05

## 2022-05-19 RX ADMIN — HYDROMORPHONE HYDROCHLORIDE 0.5 MG: 1 INJECTION, SOLUTION INTRAMUSCULAR; INTRAVENOUS; SUBCUTANEOUS at 04:05

## 2022-05-19 RX ADMIN — ONDANSETRON 4 MG: 2 INJECTION INTRAMUSCULAR; INTRAVENOUS at 03:05

## 2022-05-19 RX ADMIN — KETOROLAC TROMETHAMINE 10 MG: 30 INJECTION, SOLUTION INTRAMUSCULAR at 04:05

## 2022-05-19 RX ADMIN — MORPHINE SULFATE 6 MG: 2 INJECTION, SOLUTION INTRAMUSCULAR; INTRAVENOUS at 03:05

## 2022-05-19 NOTE — DISCHARGE INSTRUCTIONS
Diagnosis: Kidney stone    Tests today showed:   Labs Reviewed   URINALYSIS, REFLEX TO URINE CULTURE - Abnormal; Notable for the following components:       Result Value    Appearance, UA Cloudy (*)     Protein, UA 1+ (*)     Glucose, UA 1+ (*)     Occult Blood UA 3+ (*)     All other components within normal limits    Narrative:     Specimen Source->Urine   COMPREHENSIVE METABOLIC PANEL - Abnormal; Notable for the following components:    CO2 21 (*)     Glucose 159 (*)     Calcium 8.5 (*)     All other components within normal limits   URINALYSIS MICROSCOPIC - Abnormal; Notable for the following components:    RBC, UA >100 (*)     Bacteria Few (*)     Ca Oxalate Selena, UA Many (*)     All other components within normal limits    Narrative:     Specimen Source->Urine   CBC W/ AUTO DIFFERENTIAL   POCT URINE PREGNANCY     Imaging Results               CT Renal Stone Study ABD Pelvis WO (Final result)  Result time 05/19/22 05:39:24      Final result by José Miguel Gonzalez MD (05/19/22 05:39:24)                   Impression:      Distal right ureteral calculus with moderate obstructive uropathy changes.    Additional findings as above.    This report was flagged in Epic as abnormal.    Electronically signed by resident: Agus Del Rosario  Date:    05/19/2022  Time:    05:15    Electronically signed by: José Miguel Gonzalez  Date:    05/19/2022  Time:    05:39               Narrative:    EXAMINATION:  CT RENAL STONE STUDY ABD PELVIS WO    CLINICAL HISTORY:  Flank pain, kidney stone suspected;    TECHNIQUE:  Low dose axial images, sagittal and coronal reformations were obtained from the lung bases to the pubic symphysis.  Contrast was not administered.    COMPARISON:  None    FINDINGS:  Heart: Normal in size. No pericardial effusion.    Lung Bases: Well aerated, without consolidation or pleural fluid.    Liver: Normal in size and attenuation, with no focal hepatic lesions.    Gallbladder: No calcified gallstones.    Bile Ducts: No  evidence of dilated ducts.    Pancreas: No mass or peripancreatic fat stranding.    Spleen: Unremarkable.    Adrenals: Unremarkable.    Kidneys/ Ureters/bladder:    Kidneys are normal in location.    Distal right ureteral calculus near the level of the right ureterovesicular junction 4 mm calculus with moderate hydroureteronephrosis enlargement of the right kidney with mild surrounding stranding about the renal pelvis and cortex.    Nonobstructing nephrolithiasis of the left kidney is noted, there is no evidence for left-sided ureteral calculus or obstructive uropathy.    Urinary bladder is nondistended limiting visualization.    Reproductive organs: IUD in place.    GI Tract/Mesentery: No evidence of bowel obstruction or inflammation.  The appendix is identified, it does not appear inflamed.    Peritoneal Space: No ascites. No free air.    Retroperitoneum: No significant adenopathy.    Abdominal wall: Unremarkable.    Vasculature: No significant atherosclerosis or aneurysm.  Incidental retroaortic course of the left renal vein.    Bones: Small nonspecific scattered sclerotic foci about the pelvis and proximal femurs measure up to 9 mm, possibly bone islands.  No acute fracture.  Mild degenerative changes of the spine.                                      Treatments you had today:   Medications   sodium chloride 0.9% bolus 1,000 mL (0 mLs Intravenous Stopped 5/19/22 0433)   morphine injection 6 mg (6 mg Intravenous Given 5/19/22 0332)   ondansetron injection 4 mg (4 mg Intravenous Given 5/19/22 0332)   HYDROmorphone injection 0.5 mg (0.5 mg Intravenous Given 5/19/22 0408)   ketorolac injection 9.999 mg (9.999 mg Intravenous Given 5/19/22 0407)       Home Care Instructions:  - Strain your urine as instructed  - Stay well hydrated  - Continue taking your home medications as prescribed    Take ibuprofen (also called Advil, Motrin) for your pain. This medicine is available over-the-counter in 200 mg tablets.  - Take up  to 600 mg every 6 hours, or 800 mg every 8 hours as needed   - Do not take more than this amount, as it can cause kidney problems, bleeding in your stomach, and other serious problems.   - Do not also take naproxen (Aleve) at the same time or on the same day  - If you have heart problems or uncontrolled high blood pressure, you should not take ibuprofen for more than 3 days without discussing with your doctor    If your pain is not controlled with ibuprofen, you may also take Norco (acetaminophen-hydrocodone).  - Take this medication only when needed for breakthrough pain.   - Do not take more than the prescribed amount to control your pain.  - Do not operate machinery, drive, exercise, perform caregiving, make important decisions or perform important tasks while taking Norco. It can make you drowsy or forgetful.  - Norco is addictive in as little as 3 days, so take only as needed.  - Norco can dangerously slow or stop your breathing if you take too much, or if you combine it with alcohol or sedating medicines (including Xanex, Ativan) or illegal drugs.   - Norco can make you constipated (hard to poop), so take fiber supplements and a stool softener while taking this medicine.   - This medication contains acetaminophen (Tylenol). Each pill has 325 mg of acetaminophen. Do not take more than 4,000 mg of acetaminophen within 24 hours as this may lead to liver damage.  - Return to the emergency department if Melvin does not control your pain.    For nausea and vomiting, take the ondansetron (Zofran) as prescribed.  Place it under your tongue and let the medication dissolve on its own  Do not swallow whole  Give the medication 30 minutes to work before eating or drinking  Drink clear fluids (water, gatorade, broth, etc) and eat simple foods  Do not eat fatty, greasy, heavy meals when using this medication    Follow-up plan:  - Follow-up with: Primary care doctor within 3 - 5  days  - Follow-up for additional testing  and/or evaluation as directed by your primary doctor  - Follow up with the urologist as directed    Return to the Emergency Department for symptoms including but not limited to: worsening symptoms, severe abdominal or back pain, shortness of breath or chest pain, vomiting with inability to hold down fluids, blood in your stool, fevers greater than 100.4°F, dizziness or passing out/fainting/unconsciousness, or other concerning symptoms.

## 2022-05-19 NOTE — Clinical Note
"Rosa Martins" Braxton was seen and treated in our emergency department on 5/19/2022.  She may return to work on 05/23/2022.       If you have any questions or concerns, please don't hesitate to call.      Sri Hassan MD"

## 2022-05-19 NOTE — ED PROVIDER NOTES
"Source of History:  Patient  Mother  Chart    Chief complaint:  Flank Pain (Pt reports right sided flank pain. Seen a UC a week ago, it resolved, and now has come back. )      HPI:  Rosa Limon is a 32 y.o. female with history of dural venous sinus thrombosis, no longer on anticoagulation, presenting to emergency department with complaint of atraumatic flank pain.    Patient states that she had a similar episode 1 week ago that spontaneously resolved.  She was seen for this at Urgent Care, and had a urinalysis which shows hematuria and nothing else.    This evening, she developed severe, constant, right-sided flank pain that radiates to her right groin.  He took ibuprofen around 1145 without relief of her pain.  She has had nausea and vomiting but no fevers.  No dysuria but has noticed small clots of blood in her urine.  She denies the possibility of pregnancy, and states that she is not currently on her menstrual cycle.  No pain elsewhere in the abdomen.  No personal or family history of kidney stones.    ROS: As per HPI and below:  Review of Systems   Constitutional: Negative for fever.   HENT: Negative for sore throat.    Eyes: Negative for double vision.   Respiratory: Negative for cough and shortness of breath.    Cardiovascular: Negative for chest pain.   Gastrointestinal: Negative for abdominal pain and vomiting.   Genitourinary: Positive for flank pain. Negative for dysuria.   Musculoskeletal: Negative for falls.   Skin: Negative for rash.   Neurological: Negative for headaches.       Review of patient's allergies indicates:   Allergen Reactions    Metoclopramide Other (See Comments)     Makes her irratated. "feels like she is jumping out of her skin" per patient.       Current Facility-Administered Medications on File Prior to Encounter   Medication Dose Route Frequency Provider Last Rate Last Admin    dextrose 5 % in lactated ringers infusion   Intravenous Continuous Radha Nino MD        " doxycycline (VIBRAMYCIN) 100mg in dextrose 5% 250 mL IVPB (ready to mix)  100 mg Intravenous On Call Procedure Radha Nino MD   100 mg at 02/26/21 0833     Current Outpatient Medications on File Prior to Encounter   Medication Sig Dispense Refill    (Magic mouthwash) 1:1:1 diphenhydramine(Benadryl) 12.5mg/5ml liq, aluminum & magnesium hydroxide-simethicone (Maalox), LIDOcaine viscous 2% Swish and spit 10 mLs every 4 (four) hours as needed (sore throat). for sore throat 360 mL 0    cholecalciferol, vitamin D3, 1,250 mcg (50,000 unit) capsule TAKE 1 CAPSULE BY MOUTH ONCE A WEEK. TAKE AFTER A FATTY MEAL.      EScitalopram oxalate (LEXAPRO) 10 MG tablet Take 10 mg by mouth once daily.      lamoTRIgine (LAMICTAL) 200 MG tablet Take 200 mg by mouth once daily.      MULTIVITAMIN ORAL Take by mouth.      TRINTELLIX 10 mg Tab Take 1 tablet by mouth once daily.      vortioxetine (TRINTELLIX) 5 mg Tab Take 5 mg by mouth.         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 Factor II deficiency      Past Surgical History:   Procedure Laterality Date    CERVICAL BIOPSY  W/ LOOP ELECTRODE EXCISION  11/2017    COLPOSCOPY  10/12/2017    FRACTURE SURGERY      HYSTEROSCOPY N/A 2/26/2021    Procedure: HYSTEROSCOPY;  Surgeon: Radha Nino MD;  Location: [facility];  Service: OB/GYN;  Laterality: N/A;    ORIF wrist left Left 5/1/2015    TONSILLECTOMY         Social History     Socioeconomic History    Marital status: Single   Tobacco Use    Smoking status: Never Smoker    Smokeless tobacco: Never Used   Substance and Sexual Activity    Alcohol use: Yes     Comment: social    Drug use: No     Sexual activity: Yes     Partners: Male     Birth control/protection: I.U.D.       Family History   Problem Relation Age of Onset    Hypertension Maternal Grandmother     Diabetes Maternal Grandfather     Diabetes Paternal Uncle     Cancer Neg Hx     Heart disease Neg Hx     Stroke Neg Hx        Physical Exam:      Vitals:    05/19/22 0408   BP:    Pulse:    Resp: 20   Temp:      Gen:  Uncomfortable but nontoxic  Mental Status:  Alert and oriented .  Appropriate, conversant.  Skin: Warm, dry. No rashes seen.  Eyes: No conjunctival injection.  Pulm: CTAB. No increased work of breathing.  No significant tachypnea.  No audible stridor or wheezing.  No conversational dyspnea.    CV: Regular rate. Regular rhythm.   Abd: Soft.  Not distended.  Right lower quadrant tenderness to palpation without rebound tenderness or guarding  MSK: Good range of motion all joints.  No deformities.  Right CVA tenderness  Neuro: Awake. Speech normal. No focal neuro deficit observed.      Laboratory Studies:  Labs Reviewed   URINALYSIS, REFLEX TO URINE CULTURE - Abnormal; Notable for the following components:       Result Value    Appearance, UA Cloudy (*)     Protein, UA 1+ (*)     Glucose, UA 1+ (*)     Occult Blood UA 3+ (*)     All other components within normal limits    Narrative:     Specimen Source->Urine   COMPREHENSIVE METABOLIC PANEL - Abnormal; Notable for the following components:    CO2 21 (*)     Glucose 159 (*)     Calcium 8.5 (*)     All other components within normal limits   URINALYSIS MICROSCOPIC - Abnormal; Notable for the following components:    RBC, UA >100 (*)     Bacteria Few (*)     Ca Oxalate Selena, UA Many (*)     All other components within normal limits    Narrative:     Specimen Source->Urine   CBC W/ AUTO DIFFERENTIAL   POCT URINE PREGNANCY     Chart reviewed.  See summary in HPI    Imaging Results           CT Renal Stone Study ABD Pelvis WO (Final result)  Result time 05/19/22 05:39:24    Final result  by José Miguel Gonzalez MD (05/19/22 05:39:24)                 Impression:      Distal right ureteral calculus with moderate obstructive uropathy changes.    Additional findings as above.    This report was flagged in Epic as abnormal.    Electronically signed by resident: Agus Del Rosario  Date:    05/19/2022  Time:    05:15    Electronically signed by: José Miguel Gonzalez  Date:    05/19/2022  Time:    05:39             Narrative:    EXAMINATION:  CT RENAL STONE STUDY ABD PELVIS WO    CLINICAL HISTORY:  Flank pain, kidney stone suspected;    TECHNIQUE:  Low dose axial images, sagittal and coronal reformations were obtained from the lung bases to the pubic symphysis.  Contrast was not administered.    COMPARISON:  None    FINDINGS:  Heart: Normal in size. No pericardial effusion.    Lung Bases: Well aerated, without consolidation or pleural fluid.    Liver: Normal in size and attenuation, with no focal hepatic lesions.    Gallbladder: No calcified gallstones.    Bile Ducts: No evidence of dilated ducts.    Pancreas: No mass or peripancreatic fat stranding.    Spleen: Unremarkable.    Adrenals: Unremarkable.    Kidneys/ Ureters/bladder:    Kidneys are normal in location.    Distal right ureteral calculus near the level of the right ureterovesicular junction 4 mm calculus with moderate hydroureteronephrosis enlargement of the right kidney with mild surrounding stranding about the renal pelvis and cortex.    Nonobstructing nephrolithiasis of the left kidney is noted, there is no evidence for left-sided ureteral calculus or obstructive uropathy.    Urinary bladder is nondistended limiting visualization.    Reproductive organs: IUD in place.    GI Tract/Mesentery: No evidence of bowel obstruction or inflammation.  The appendix is identified, it does not appear inflamed.    Peritoneal Space: No ascites. No free air.    Retroperitoneum: No significant adenopathy.    Abdominal wall: Unremarkable.    Vasculature: No significant  atherosclerosis or aneurysm.  Incidental retroaortic course of the left renal vein.    Bones: Small nonspecific scattered sclerotic foci about the pelvis and proximal femurs measure up to 9 mm, possibly bone islands.  No acute fracture.  Mild degenerative changes of the spine.                                Medications Given:  Medications   sodium chloride 0.9% bolus 1,000 mL (0 mLs Intravenous Stopped 5/19/22 0433)   morphine injection 6 mg (6 mg Intravenous Given 5/19/22 0332)   ondansetron injection 4 mg (4 mg Intravenous Given 5/19/22 0332)   HYDROmorphone injection 0.5 mg (0.5 mg Intravenous Given 5/19/22 0408)   ketorolac injection 9.999 mg (9.999 mg Intravenous Given 5/19/22 0407)     MDM:    32 y.o. female with atraumatic right flank pain.  Afebrile, stable, nontoxic but extremely uncomfortable.    Differential diagnosis including but not limited to:  Kidney stone, pyelonephritis, gas pain, intra-abdominal infection, dehydration, pregnancy    Will obtain labs, urinalysis, C T. Will give Zofran, morphine.    Urine pregnancy test negative.  Urinalysis with hematuria but no evidence of infection.    Patient states pain is 10/10 after morphine administration.  Will give Toradol and Dilaudid.    Labs reviewed.  No acute abnormality.  CT scan demonstrates 4 mm kidney stone.    Patient is pain-free on reassessment.  She is provided with a strainer.  Information given regarding follow-up plans with Urology and referral ordered.  She was prescribed medications instructed on their use including safe use of opioids.  Discharged home in stable condition.  Return precautions discussed at bedside.    Diagnostic Impression:    1. Kidney stone    2. Right flank pain         ED Disposition Condition    Discharge Stable        ED Prescriptions     Medication Sig Dispense Start Date End Date Auth. Provider    ibuprofen (ADVIL,MOTRIN) 600 MG tablet Take 1 tablet (600 mg total) by mouth every 6 (six) hours as needed for Pain. 20  tablet 5/19/2022 5/24/2022 Sri Hassan MD    HYDROcodone-acetaminophen (NORCO) 5-325 mg per tablet Take 1 tablet by mouth every 6 (six) hours as needed for Pain. 12 tablet 5/19/2022 5/22/2022 Sri Hassan MD    ondansetron (ZOFRAN) 4 MG tablet Take 1 tablet (4 mg total) by mouth every 6 (six) hours as needed for Nausea. 12 tablet 5/19/2022  Sri Hassan MD    tamsulosin (FLOMAX) 0.4 mg Cap Take 1 capsule (0.4 mg total) by mouth once daily. for 10 days 10 capsule 5/19/2022 5/29/2022 Sri Hassan MD        Follow-up Information     Follow up With Specialties Details Why Contact Info Additional Information    Eileen John MD Internal Medicine Schedule an appointment as soon as possible for a visit   [address]  [place] [address]  [phone]       [facility] - Urology [facility] Urology Schedule an appointment as soon as possible for a visit   [address] [facility] [id number] [phone] Main Building, 4th Floor Please park in [facility] and take Atrium elevator          Patient and family understand the plan and is in agreement, verbalized understanding, questions answered    Sri Hassan MD  Emergency Medicine       Sri Hassan MD  05/19/22 1060

## 2022-05-23 ENCOUNTER — OFFICE VISIT (OUTPATIENT)
Dept: UROLOGY | Facility: CLINIC | Age: 32
End: 2022-05-23
Payer: COMMERCIAL

## 2022-05-23 ENCOUNTER — TELEPHONE (OUTPATIENT)
Dept: UROLOGY | Facility: CLINIC | Age: 32
End: 2022-05-23

## 2022-05-23 ENCOUNTER — PATIENT MESSAGE (OUTPATIENT)
Dept: UROLOGY | Facility: CLINIC | Age: 32
End: 2022-05-23

## 2022-05-23 VITALS
WEIGHT: 198.63 LBS | HEIGHT: 61 IN | BODY MASS INDEX: 37.5 KG/M2 | DIASTOLIC BLOOD PRESSURE: 80 MMHG | SYSTOLIC BLOOD PRESSURE: 110 MMHG | HEART RATE: 83 BPM

## 2022-05-23 DIAGNOSIS — N20.0 NEPHROLITHIASIS: ICD-10-CM

## 2022-05-23 PROBLEM — T83.39XA RETAINED INTRAUTERINE CONTRACEPTIVE DEVICE (IUD): Status: RESOLVED | Noted: 2021-02-26 | Resolved: 2022-05-23

## 2022-05-23 PROCEDURE — 99999 PR PBB SHADOW E&M-EST. PATIENT-LVL V: CPT | Mod: PBBFAC,,, | Performed by: UROLOGY

## 2022-05-23 PROCEDURE — 99204 OFFICE O/P NEW MOD 45 MIN: CPT | Mod: S$GLB,,, | Performed by: UROLOGY

## 2022-05-23 PROCEDURE — 99204 PR OFFICE/OUTPT VISIT, NEW, LEVL IV, 45-59 MIN: ICD-10-PCS | Mod: S$GLB,,, | Performed by: UROLOGY

## 2022-05-23 PROCEDURE — 99999 PR PBB SHADOW E&M-EST. PATIENT-LVL V: ICD-10-PCS | Mod: PBBFAC,,, | Performed by: UROLOGY

## 2022-05-23 NOTE — LETTER
May 23, 2022        Sri Hassan MD  [address] [facility] [address]             [facility] - Urology [facility] 4th Fl  [address]  [facility] [address]  Phone: [phone]   Patient: Rosa Limon   MR Number: [id number]   YOB: 1990   Date of Visit: 5/23/2022       Dear Dr. Hassan:    Thank you for referring Rosa Limon to me for evaluation. Attached you will find relevant portions of my assessment and plan of care.    If you have questions, please do not hesitate to call me. I look forward to following Rosa Limon along with you.    Sincerely,      Shreyas Scott MD            CC  No Recipients    Enclosure

## 2022-05-23 NOTE — H&P (VIEW-ONLY)
CHIEF COMPLAINT:    Mrs. Limon is a 32 y.o. female presenting for a consultation at the request of Dr. Hassan. Patient presents with nephrolithiasis.    PRESENTING ILLNESS:    Rosa Limon is a 32 y.o. female who went to the ER on 5/19/22 c/o R flank pain.  A CT RSS was done that I independently reviewed.  There is a 3 mm R UVJ stone.    She has been straining her urine and has not passed the stone.    REVIEW OF SYSTEMS:    Rosa Limon denies headache, blurred vision, fever, nausea, vomiting, chills, abdominal pain, chest pain, sore throat, bleeding per rectum, cough, SOB, recent loss of consciousness, recent mental status changes, seizures, dizziness, or upper or lower extremity weakness.    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 Factor II deficiency        Past Surgical History:   Procedure Laterality Date    CERVICAL BIOPSY  W/ LOOP ELECTRODE EXCISION  11/2017    COLPOSCOPY  10/12/2017    FRACTURE SURGERY      HYSTEROSCOPY N/A 2/26/2021    Procedure: HYSTEROSCOPY;  Surgeon: Radha Nino MD;  Location: [facility];  Service: OB/GYN;  Laterality: N/A;    ORIF wrist left Left 5/1/2015    TONSILLECTOMY         Family History   Problem Relation Age of Onset    Hypertension Maternal Grandmother     Diabetes Maternal Grandfather     Diabetes Paternal Uncle     Cancer Neg Hx     Heart disease Neg Hx     Stroke Neg Hx        Social History     Socioeconomic History    Marital status: Single   Tobacco Use    Smoking status: Never Smoker    Smokeless tobacco: Never Used   Substance and Sexual Activity    Alcohol use: Yes     Comment: social    Drug use: No     Sexual activity: Yes     Partners: Male     Birth control/protection: I.U.D.       Allergies:  Metoclopramide    Medications:    Current Outpatient Medications:     (Magic mouthwash) 1:1:1 diphenhydramine(Benadryl) 12.5mg/5ml liq, aluminum & magnesium hydroxide-simethicone (Maalox), LIDOcaine viscous 2%, Swish and spit 10 mLs every 4 (four) hours as needed (sore throat). for sore throat, Disp: 360 mL, Rfl: 0    cholecalciferol, vitamin D3, 1,250 mcg (50,000 unit) capsule, TAKE 1 CAPSULE BY MOUTH ONCE A WEEK. TAKE AFTER A FATTY MEAL., Disp: , Rfl:     EScitalopram oxalate (LEXAPRO) 10 MG tablet, Take 10 mg by mouth once daily., Disp: , Rfl:     ibuprofen (ADVIL,MOTRIN) 600 MG tablet, Take 1 tablet (600 mg total) by mouth every 6 (six) hours as needed for Pain., Disp: 20 tablet, Rfl: 0    lamoTRIgine (LAMICTAL) 200 MG tablet, Take 200 mg by mouth once daily., Disp: , Rfl:     MULTIVITAMIN ORAL, Take by mouth., Disp: , Rfl:     ondansetron (ZOFRAN) 4 MG tablet, Take 1 tablet (4 mg total) by mouth every 6 (six) hours as needed for Nausea., Disp: 12 tablet, Rfl: 0    tamsulosin (FLOMAX) 0.4 mg Cap, Take 1 capsule (0.4 mg total) by mouth once daily. for 10 days, Disp: 10 capsule, Rfl: 0    TRINTELLIX 10 mg Tab, Take 1 tablet by mouth once daily., Disp: , Rfl:     vortioxetine (TRINTELLIX) 5 mg Tab, Take 5 mg by mouth., Disp: , Rfl:   No current facility-administered medications for this visit.    Facility-Administered Medications Ordered in Other Visits:     dextrose 5 % in lactated ringers infusion, , Intravenous, Continuous, Radha Nino MD    doxycycline (VIBRAMYCIN) 100mg in dextrose 5% 250 mL IVPB (ready to mix), 100 mg, Intravenous, On Call Procedure, Radha Nino MD, 100 mg at 02/26/21 0833    PHYSICAL EXAMINATION:    The patient generally appears in good health, is appropriately interactive, and is in no apparent distress.     Eyes: anicteric sclerae, moist conjunctivae; no lid-lag; PERRLA      HENT: Atraumatic; oropharynx clear with moist mucous membranes and no mucosal ulcerations;normal hard and soft palate. No evidence of lymphadenopathy.    Neck: Trachea midline.  No thyromegaly.    Skin: No lesions.    Mental: Cooperative with normal affect.  Is oriented to time, place, and person.    Neuro: Grossly intact.    Chest: Normal inspiratory effort.   No accessory muscles.  No audible wheezes.  Respirations symmetric on inspiration and expiration.    Heart: Regular rhythm.      Abdomen:  Soft, non-tender. No masses or organomegaly. Bladder is not palpable. No evidence of flank discomfort. No evidence of inguinal hernia.    Extremities: No clubbing, cyanosis, or edema      LABS:    UA dipped positive for blood.  Otherwise negative    IMPRESSION:    Encounter Diagnoses   Name Primary?    Nephrolithiasis        PLAN:    1. Continue to strain urine.  2. Recommend URS. The patient is scheduled for ureteroscopy. The risks and benefits of ureteroscopy were discussed with the patient in detail.  The risks include but are not limited to burning with urination, bleeding, infection, pain, incomplete fragmentation of the stone, need for further procedures, injury to the kidney, ureter, bladder and need for open surgery.  The patient was informed that they may require a ureteral stent and that stents can cause irritative voiding symptoms.  They also understand that ureteral stents are temporary and must be removed or exchanged in a timely fashion as they can calcify and make more stones and become difficult to remove. Alternative treatments were also discussed with the patient in detail to include ESWL, percutaneous treatment of the stone, open surgery or observation. Patient understands these risks and has agreed to proceed with surgery.  3. Will cancel if she passes the stone.    Copy to: Mo

## 2022-05-23 NOTE — PROGRESS NOTES
CHIEF COMPLAINT:    Mrs. Limon is a 32 y.o. female presenting for a consultation at the request of Dr. Hassan. Patient presents with nephrolithiasis.    PRESENTING ILLNESS:    Rosa Limon is a 32 y.o. female who went to the ER on 5/19/22 c/o R flank pain.  A CT RSS was done that I independently reviewed.  There is a 3 mm R UVJ stone.    She has been straining her urine and has not passed the stone.    REVIEW OF SYSTEMS:    Rosa Limon denies headache, blurred vision, fever, nausea, vomiting, chills, abdominal pain, chest pain, sore throat, bleeding per rectum, cough, SOB, recent loss of consciousness, recent mental status changes, seizures, dizziness, or upper or lower extremity weakness.    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 Factor II deficiency        Past Surgical History:   Procedure Laterality Date    CERVICAL BIOPSY  W/ LOOP ELECTRODE EXCISION  11/2017    COLPOSCOPY  10/12/2017    FRACTURE SURGERY      HYSTEROSCOPY N/A 2/26/2021    Procedure: HYSTEROSCOPY;  Surgeon: Radha Nino MD;  Location: [facility];  Service: OB/GYN;  Laterality: N/A;    ORIF wrist left Left 5/1/2015    TONSILLECTOMY         Family History   Problem Relation Age of Onset    Hypertension Maternal Grandmother     Diabetes Maternal Grandfather     Diabetes Paternal Uncle     Cancer Neg Hx     Heart disease Neg Hx     Stroke Neg Hx        Social History     Socioeconomic History    Marital status: Single   Tobacco Use    Smoking status: Never Smoker    Smokeless tobacco: Never Used   Substance and Sexual Activity    Alcohol use: Yes     Comment: social    Drug use: No     Sexual activity: Yes     Partners: Male     Birth control/protection: I.U.D.       Allergies:  Metoclopramide    Medications:    Current Outpatient Medications:     (Magic mouthwash) 1:1:1 diphenhydramine(Benadryl) 12.5mg/5ml liq, aluminum & magnesium hydroxide-simethicone (Maalox), LIDOcaine viscous 2%, Swish and spit 10 mLs every 4 (four) hours as needed (sore throat). for sore throat, Disp: 360 mL, Rfl: 0    cholecalciferol, vitamin D3, 1,250 mcg (50,000 unit) capsule, TAKE 1 CAPSULE BY MOUTH ONCE A WEEK. TAKE AFTER A FATTY MEAL., Disp: , Rfl:     EScitalopram oxalate (LEXAPRO) 10 MG tablet, Take 10 mg by mouth once daily., Disp: , Rfl:     ibuprofen (ADVIL,MOTRIN) 600 MG tablet, Take 1 tablet (600 mg total) by mouth every 6 (six) hours as needed for Pain., Disp: 20 tablet, Rfl: 0    lamoTRIgine (LAMICTAL) 200 MG tablet, Take 200 mg by mouth once daily., Disp: , Rfl:     MULTIVITAMIN ORAL, Take by mouth., Disp: , Rfl:     ondansetron (ZOFRAN) 4 MG tablet, Take 1 tablet (4 mg total) by mouth every 6 (six) hours as needed for Nausea., Disp: 12 tablet, Rfl: 0    tamsulosin (FLOMAX) 0.4 mg Cap, Take 1 capsule (0.4 mg total) by mouth once daily. for 10 days, Disp: 10 capsule, Rfl: 0    TRINTELLIX 10 mg Tab, Take 1 tablet by mouth once daily., Disp: , Rfl:     vortioxetine (TRINTELLIX) 5 mg Tab, Take 5 mg by mouth., Disp: , Rfl:   No current facility-administered medications for this visit.    Facility-Administered Medications Ordered in Other Visits:     dextrose 5 % in lactated ringers infusion, , Intravenous, Continuous, Radha Nino MD    doxycycline (VIBRAMYCIN) 100mg in dextrose 5% 250 mL IVPB (ready to mix), 100 mg, Intravenous, On Call Procedure, Radha Nino MD, 100 mg at 02/26/21 0833    PHYSICAL EXAMINATION:    The patient generally appears in good health, is appropriately interactive, and is in no apparent distress.     Eyes: anicteric sclerae, moist conjunctivae; no lid-lag; PERRLA      HENT: Atraumatic; oropharynx clear with moist mucous membranes and no mucosal ulcerations;normal hard and soft palate. No evidence of lymphadenopathy.    Neck: Trachea midline.  No thyromegaly.    Skin: No lesions.    Mental: Cooperative with normal affect.  Is oriented to time, place, and person.    Neuro: Grossly intact.    Chest: Normal inspiratory effort.   No accessory muscles.  No audible wheezes.  Respirations symmetric on inspiration and expiration.    Heart: Regular rhythm.      Abdomen:  Soft, non-tender. No masses or organomegaly. Bladder is not palpable. No evidence of flank discomfort. No evidence of inguinal hernia.    Extremities: No clubbing, cyanosis, or edema      LABS:    UA dipped positive for blood.  Otherwise negative    IMPRESSION:    Encounter Diagnoses   Name Primary?    Nephrolithiasis        PLAN:    1. Continue to strain urine.  2. Recommend URS. The patient is scheduled for ureteroscopy. The risks and benefits of ureteroscopy were discussed with the patient in detail.  The risks include but are not limited to burning with urination, bleeding, infection, pain, incomplete fragmentation of the stone, need for further procedures, injury to the kidney, ureter, bladder and need for open surgery.  The patient was informed that they may require a ureteral stent and that stents can cause irritative voiding symptoms.  They also understand that ureteral stents are temporary and must be removed or exchanged in a timely fashion as they can calcify and make more stones and become difficult to remove. Alternative treatments were also discussed with the patient in detail to include ESWL, percutaneous treatment of the stone, open surgery or observation. Patient understands these risks and has agreed to proceed with surgery.  3. Will cancel if she passes the stone.    Copy to: Mo

## 2022-05-23 NOTE — TELEPHONE ENCOUNTER
Tried to contact pt for scheduling (10:56am), no answer. Left v/m with my contact number asking pt to return the call.

## 2022-05-25 ENCOUNTER — TELEPHONE (OUTPATIENT)
Dept: UROLOGY | Facility: CLINIC | Age: 32
End: 2022-05-25
Payer: COMMERCIAL

## 2022-05-25 ENCOUNTER — NURSE TRIAGE (OUTPATIENT)
Dept: ADMINISTRATIVE | Facility: CLINIC | Age: 32
End: 2022-05-25
Payer: COMMERCIAL

## 2022-05-25 DIAGNOSIS — N20.0 NEPHROLITHIASIS: Primary | ICD-10-CM

## 2022-05-26 ENCOUNTER — HOSPITAL ENCOUNTER (EMERGENCY)
Facility: HOSPITAL | Age: 32
Discharge: HOME | End: 2022-05-26
Attending: EMERGENCY MEDICINE
Payer: COMMERCIAL

## 2022-05-26 ENCOUNTER — PATIENT MESSAGE (OUTPATIENT)
Dept: UROLOGY | Facility: CLINIC | Age: 32
End: 2022-05-26
Payer: COMMERCIAL

## 2022-05-26 VITALS
TEMPERATURE: 98 F | RESPIRATION RATE: 18 BRPM | WEIGHT: 198.63 LBS | DIASTOLIC BLOOD PRESSURE: 79 MMHG | HEART RATE: 74 BPM | OXYGEN SATURATION: 100 % | BODY MASS INDEX: 37.53 KG/M2 | SYSTOLIC BLOOD PRESSURE: 131 MMHG

## 2022-05-26 DIAGNOSIS — N20.1 URETEROLITHIASIS: Primary | ICD-10-CM

## 2022-05-26 DIAGNOSIS — N13.2 HYDRONEPHROSIS WITH URINARY OBSTRUCTION DUE TO URETERAL CALCULUS: ICD-10-CM

## 2022-05-26 LAB
ALBUMIN SERPL BCP-MCNC: 3.7 G/DL (ref 3.5–5.2)
ALP SERPL-CCNC: 80 U/L (ref 55–135)
ALT SERPL W/O P-5'-P-CCNC: 23 U/L (ref 10–44)
ANION GAP SERPL CALC-SCNC: 13 MMOL/L (ref 8–16)
AST SERPL-CCNC: 22 U/L (ref 10–40)
B-HCG UR QL: NEGATIVE
BASOPHILS # BLD AUTO: 0.06 K/UL (ref 0–0.2)
BASOPHILS NFR BLD: 0.4 % (ref 0–1.9)
BILIRUB SERPL-MCNC: 0.7 MG/DL (ref 0.1–1)
BILIRUB UR QL STRIP: NEGATIVE
BUN SERPL-MCNC: 12 MG/DL (ref 6–20)
CALCIUM SERPL-MCNC: 8.8 MG/DL (ref 8.7–10.5)
CHLORIDE SERPL-SCNC: 105 MMOL/L (ref 95–110)
CLARITY UR REFRACT.AUTO: CLEAR
CO2 SERPL-SCNC: 19 MMOL/L (ref 23–29)
COLOR UR AUTO: YELLOW
CREAT SERPL-MCNC: 1.2 MG/DL (ref 0.5–1.4)
CTP QC/QA: YES
DIFFERENTIAL METHOD: ABNORMAL
EOSINOPHIL # BLD AUTO: 0.2 K/UL (ref 0–0.5)
EOSINOPHIL NFR BLD: 1.1 % (ref 0–8)
ERYTHROCYTE [DISTWIDTH] IN BLOOD BY AUTOMATED COUNT: 13.2 % (ref 11.5–14.5)
EST. GFR  (AFRICAN AMERICAN): >60 ML/MIN/1.73 M^2
EST. GFR  (NON AFRICAN AMERICAN): 59.5 ML/MIN/1.73 M^2
GLUCOSE SERPL-MCNC: 108 MG/DL (ref 70–110)
GLUCOSE UR QL STRIP: NEGATIVE
HCT VFR BLD AUTO: 39.5 % (ref 37–48.5)
HCV AB SERPL QL IA: NEGATIVE
HGB BLD-MCNC: 13.5 G/DL (ref 12–16)
HGB UR QL STRIP: NEGATIVE
HIV 1+2 AB+HIV1 P24 AG SERPL QL IA: NEGATIVE
IMM GRANULOCYTES # BLD AUTO: 0.04 K/UL (ref 0–0.04)
IMM GRANULOCYTES NFR BLD AUTO: 0.3 % (ref 0–0.5)
KETONES UR QL STRIP: ABNORMAL
LEUKOCYTE ESTERASE UR QL STRIP: NEGATIVE
LYMPHOCYTES # BLD AUTO: 2.2 K/UL (ref 1–4.8)
LYMPHOCYTES NFR BLD: 15.5 % (ref 18–48)
MCH RBC QN AUTO: 28.3 PG (ref 27–31)
MCHC RBC AUTO-ENTMCNC: 34.2 G/DL (ref 32–36)
MCV RBC AUTO: 83 FL (ref 82–98)
MONOCYTES # BLD AUTO: 1.1 K/UL (ref 0.3–1)
MONOCYTES NFR BLD: 7.7 % (ref 4–15)
NEUTROPHILS # BLD AUTO: 10.6 K/UL (ref 1.8–7.7)
NEUTROPHILS NFR BLD: 75 % (ref 38–73)
NITRITE UR QL STRIP: NEGATIVE
NRBC BLD-RTO: 0 /100 WBC
PH UR STRIP: 5 [PH] (ref 5–8)
PLATELET # BLD AUTO: 321 K/UL (ref 150–450)
PMV BLD AUTO: 8.7 FL (ref 9.2–12.9)
POTASSIUM SERPL-SCNC: 4.3 MMOL/L (ref 3.5–5.1)
PROT SERPL-MCNC: 6.8 G/DL (ref 6–8.4)
PROT UR QL STRIP: NEGATIVE
RBC # BLD AUTO: 4.77 M/UL (ref 4–5.4)
SODIUM SERPL-SCNC: 137 MMOL/L (ref 136–145)
SP GR UR STRIP: 1.03 (ref 1–1.03)
URN SPEC COLLECT METH UR: ABNORMAL
WBC # BLD AUTO: 14.11 K/UL (ref 3.9–12.7)

## 2022-05-26 PROCEDURE — 25000003 PHARM REV CODE 250: Performed by: EMERGENCY MEDICINE

## 2022-05-26 PROCEDURE — 63600175 PHARM REV CODE 636 W HCPCS: Performed by: EMERGENCY MEDICINE

## 2022-05-26 PROCEDURE — 87389 HIV-1 AG W/HIV-1&-2 AB AG IA: CPT | Performed by: EMERGENCY MEDICINE

## 2022-05-26 PROCEDURE — 81025 URINE PREGNANCY TEST: CPT | Performed by: EMERGENCY MEDICINE

## 2022-05-26 PROCEDURE — 99285 PR EMERGENCY DEPT VISIT,LEVEL V: ICD-10-PCS | Mod: ,,, | Performed by: EMERGENCY MEDICINE

## 2022-05-26 PROCEDURE — 86803 HEPATITIS C AB TEST: CPT | Performed by: EMERGENCY MEDICINE

## 2022-05-26 PROCEDURE — 81003 URINALYSIS AUTO W/O SCOPE: CPT | Performed by: EMERGENCY MEDICINE

## 2022-05-26 PROCEDURE — 96375 TX/PRO/DX INJ NEW DRUG ADDON: CPT

## 2022-05-26 PROCEDURE — 96374 THER/PROPH/DIAG INJ IV PUSH: CPT

## 2022-05-26 PROCEDURE — 99285 EMERGENCY DEPT VISIT HI MDM: CPT | Mod: 25

## 2022-05-26 PROCEDURE — 99285 EMERGENCY DEPT VISIT HI MDM: CPT | Mod: ,,, | Performed by: EMERGENCY MEDICINE

## 2022-05-26 PROCEDURE — 85025 COMPLETE CBC W/AUTO DIFF WBC: CPT | Performed by: EMERGENCY MEDICINE

## 2022-05-26 PROCEDURE — 80053 COMPREHEN METABOLIC PANEL: CPT | Performed by: EMERGENCY MEDICINE

## 2022-05-26 RX ORDER — KETOROLAC TROMETHAMINE 10 MG/1
10 TABLET, FILM COATED ORAL EVERY 6 HOURS PRN
Qty: 20 TABLET | Refills: 0 | Status: SHIPPED | OUTPATIENT
Start: 2022-05-26 | End: 2022-05-31

## 2022-05-26 RX ORDER — HYDROCODONE BITARTRATE AND ACETAMINOPHEN 5; 325 MG/1; MG/1
1 TABLET ORAL EVERY 4 HOURS PRN
Qty: 18 TABLET | Refills: 0 | Status: SHIPPED | OUTPATIENT
Start: 2022-05-26 | End: 2022-08-19

## 2022-05-26 RX ORDER — TAMSULOSIN HYDROCHLORIDE 0.4 MG/1
0.4 CAPSULE ORAL DAILY
Qty: 10 CAPSULE | Refills: 0 | Status: SHIPPED | OUTPATIENT
Start: 2022-05-26 | End: 2022-05-26 | Stop reason: SDUPTHER

## 2022-05-26 RX ORDER — TAMSULOSIN HYDROCHLORIDE 0.4 MG/1
0.4 CAPSULE ORAL DAILY
Qty: 10 CAPSULE | Refills: 0 | Status: SHIPPED | OUTPATIENT
Start: 2022-05-26 | End: 2022-08-19

## 2022-05-26 RX ORDER — ONDANSETRON 2 MG/ML
4 INJECTION INTRAMUSCULAR; INTRAVENOUS ONCE
Status: COMPLETED | OUTPATIENT
Start: 2022-05-26 | End: 2022-05-26

## 2022-05-26 RX ORDER — HYDROMORPHONE HYDROCHLORIDE 1 MG/ML
0.5 INJECTION, SOLUTION INTRAMUSCULAR; INTRAVENOUS; SUBCUTANEOUS
Status: COMPLETED | OUTPATIENT
Start: 2022-05-26 | End: 2022-05-26

## 2022-05-26 RX ORDER — KETOROLAC TROMETHAMINE 30 MG/ML
10 INJECTION, SOLUTION INTRAMUSCULAR; INTRAVENOUS
Status: COMPLETED | OUTPATIENT
Start: 2022-05-26 | End: 2022-05-26

## 2022-05-26 RX ORDER — HYDROCODONE BITARTRATE AND ACETAMINOPHEN 10; 325 MG/1; MG/1
1 TABLET ORAL
Status: COMPLETED | OUTPATIENT
Start: 2022-05-26 | End: 2022-05-26

## 2022-05-26 RX ADMIN — ONDANSETRON 4 MG: 2 INJECTION INTRAMUSCULAR; INTRAVENOUS at 03:05

## 2022-05-26 RX ADMIN — HYDROMORPHONE HYDROCHLORIDE 0.5 MG: 1 INJECTION, SOLUTION INTRAMUSCULAR; INTRAVENOUS; SUBCUTANEOUS at 04:05

## 2022-05-26 RX ADMIN — HYDROCODONE BITARTRATE AND ACETAMINOPHEN 1 TABLET: 10; 325 TABLET ORAL at 06:05

## 2022-05-26 RX ADMIN — KETOROLAC TROMETHAMINE 10 MG: 30 INJECTION, SOLUTION INTRAMUSCULAR at 03:05

## 2022-05-26 NOTE — ED NOTES
Adult Physical Assessment    Pt reports pain to right flank 10/10 and nausea. Urine is dark, denies dysuria and discharge.  LOC: Rosa Limon, 32 y.o. female verified via two identifiers.  The patient is awake, alert, oriented and speaking appropriately at this time.  APPEARANCE: Patient appears uncomfortable, grabbing right flank, states similar SX 1 week ago resolved and came back last night. DX kidney stone, pt denies passing stone at home. Patient is clean and well groomed, patient's clothing is properly fastened.  SKIN:The skin is warm and dry, color consistent with ethnicity, patient has normal skin turgor and moist mucus membranes, skin intact, no breakdown or brusing noted.  MUSCULOSKELETAL: Patient moving all extremities well, no obvious swelling or deformities noted.  RESPIRATORY: Airway is open and patent, respirations are spontaneous, patient has a normal effort and rate, no accessory muscle use noted.  CARDIAC: Patient has a normal rate and rhythm, no periphreal edema noted in any extremity, capillary refill < 3 seconds in all extremities  ABDOMEN: Soft and non tender to palpation, no abdominal distention noted. Bowel sounds present in all four quadrants.  NEUROLOGIC: Eyes open spontaneously, behavior appropriate to situation, follows commands, facial expression symmetrical, bilateral hand grasp equal and even, purposeful motor response noted, normal sensation in all extremities when touched with a finger.

## 2022-05-26 NOTE — ED PROVIDER NOTES
"Encounter Date: 5/26/2022     History     Chief Complaint   Patient presents with    Flank Pain     Pt dx with R kidney stone couple weeks ago. Pt reports that has had any pain until noon yesterday.      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 Pt did well subsequently and has not had pain for the past few days until this evening.  Pt has seen urology and there is a plan for stone extraction on 6/8/22.  No fevers  Pain similar but now some pain in pelvis  No other complaints    Review of patient's allergies indicates:   Allergen Reactions    Metoclopramide Other (See Comments)     Makes her irratated. "feels like she is jumping out of her skin" per patient.     Past Medical History:   Diagnosis Date    Abnormal Pap smear of cervix 09/14/2015    (+) Low Grade Squamous Intraepithelial Lesion     Arthritis     left elbow    ASCUS with positive high risk HPV cervical 09/21/2017    Depression     Factor II deficiency     CVA on Nuvaring    HPV in female     Insomnia     Stroke     CVA on Nuvaring. Factor II deficiency      Past Surgical History:   Procedure Laterality Date    CERVICAL BIOPSY  W/ LOOP ELECTRODE EXCISION  11/2017    COLPOSCOPY  10/12/2017    FRACTURE SURGERY      HYSTEROSCOPY N/A 2/26/2021    Procedure: HYSTEROSCOPY;  Surgeon: Radha Nino MD;  Location: [facility];  Service: OB/GYN;  Laterality: N/A;    ORIF wrist left Left 5/1/2015    TONSILLECTOMY       Family History   Problem Relation Age of Onset    Hypertension Maternal Grandmother     Diabetes Maternal Grandfather     Diabetes Paternal Uncle     Cancer Neg Hx     Heart disease Neg Hx     Stroke Neg Hx      Social History     Tobacco Use    Smoking status: Never Smoker    Smokeless tobacco: Never Used   Substance Use Topics    Alcohol use: Yes     Comment: social "    Drug use: No     Review of Systems   Constitutional: Negative for fatigue and fever.   HENT: Negative for sore throat.    Eyes: Negative for visual disturbance.   Respiratory: Negative for shortness of breath.    Cardiovascular: Negative for chest pain.   Gastrointestinal: Positive for nausea. Negative for abdominal pain.   Genitourinary: Positive for pelvic pain. Negative for dysuria.   Musculoskeletal: Negative for back pain.   Skin: Negative for rash.   Neurological: Negative for weakness and headaches.       Physical Exam     Initial Vitals [05/26/22 0322]   BP Pulse Resp Temp SpO2   138/80 96 18 97.8 °F (36.6 °C) 98 %      MAP       --         Physical Exam    Nursing note and vitals reviewed.  Constitutional: She appears well-developed and well-nourished. She is not diaphoretic. No distress.   HENT:   Head: Normocephalic and atraumatic.   Eyes: Conjunctivae are normal.   Neck: Neck supple.   Cardiovascular: Normal rate, regular rhythm, normal heart sounds and intact distal pulses.   Pulmonary/Chest: Breath sounds normal. No respiratory distress. She has no wheezes.   Abdominal: Abdomen is soft. She exhibits no distension. There is no abdominal tenderness. There is no rebound.   Musculoskeletal:         General: No edema.      Cervical back: Neck supple.     Neurological: She is alert and oriented to person, place, and time. GCS score is 15. GCS eye subscore is 4. GCS verbal subscore is 5. GCS motor subscore is 6.   Skin: Skin is warm and dry.         ED Course   Procedures  Labs Reviewed   CBC W/ AUTO DIFFERENTIAL - Abnormal; Notable for the following components:       Result Value    WBC 14.11 (*)     MPV 8.7 (*)     Gran # (ANC) 10.6 (*)     Mono # 1.1 (*)     Gran % 75.0 (*)     Lymph % 15.5 (*)     All other components within normal limits    Narrative:     Release to patient->Immediate   COMPREHENSIVE METABOLIC PANEL - Abnormal; Notable for the following components:    CO2 19 (*)     eGFR if non   59.5 (*)     All other components within normal limits    Narrative:     Release to patient->Immediate   URINALYSIS, REFLEX TO URINE CULTURE - Abnormal; Notable for the following components:    Ketones, UA Trace (*)     All other components within normal limits    Narrative:     Specimen Source->Urine   HIV 1 / 2 ANTIBODY   HEPATITIS C ANTIBODY   POCT URINE PREGNANCY          Imaging Results           CT Renal Stone Study ABD Pelvis WO (Final result)  Result time 05/26/22 06:11:21    Final result by Vanessa Rocha MD (05/26/22 06:11:21)                 Impression:      1. Redemonstration of moderate/severe right hydroureteronephrosis secondary to a 4 mm calculus at the level of the right ureterovesicular junction.  This calculus appears slightly more distal in position when compared to prior exam of 05/19/2022.  2. Right perinephric and periureteral inflammatory change, slightly increased from prior exam.  This can be seen with infection or obstruction.  Correlation with urinalysis advised.  3. Nonobstructing left nephrolithiasis.  4. Additional findings as discussed above.  This report was flagged in Epic as abnormal.    Electronically signed by resident: Agus Del Rosario  Date:    05/26/2022  Time:    04:53    Electronically signed by: Vanessa Rocha MD  Date:    05/26/2022  Time:    06:11             Narrative:    EXAMINATION:  CT RENAL STONE STUDY ABD PELVIS WO    CLINICAL HISTORY:  Flank pain, kidney stone suspected;    TECHNIQUE:  Low dose axial images, sagittal and coronal reformations were obtained from the lung bases to the pubic symphysis.  Contrast was not administered.    COMPARISON:  CT abdomen pelvis 05/19/2022    FINDINGS:  The visualized lung bases are free of pleural fluid or focal consolidation.  The visualized portions of the heart and pericardium are within normal limits.    Please note evaluation of solid organ parenchyma is limited due to lack of IV contrast.  The liver, spleen,  pancreas and adrenal glands demonstrate an unremarkable noncontrast CT appearance.  Gallbladder is slightly distended without calcified stone in its lumen.    There is redemonstration of moderate to severe right hydroureteronephrosis secondary to a 4 mm calculus in the distal right ureter, at the level of the right ureterovesicular junction noting this appears slightly more distally positioned when compared to prior exam of 05/19/2022.  There is right perinephric and periureteral inflammatory change slightly increased from prior exam.  This can be seen with obstruction and/or infection.  Correlation with urinalysis is advised. Punctate nonobstructing nephrolithiasis of the left kidney is again noted, there is no evidence for left-sided ureteral calculus or obstructive the urinary bladder is within normal limits given its degree of distention.  Uterus and adnexal regions are within normal limits noting there is an IUD in place.    The abdominal aorta is nonaneurysmal.  Incidentally noted retroaortic left renal vein.  Shotty periaortic lymph nodes are present.  There also normal and mildly enlarged mesenteric lymph nodes present most pronounced in the right abdomen.  These are of uncertain etiology/clinical significance.    The visualized loops of large and small bowel demonstrate no evidence of obstruction or inflammatory change.  The appendix is unremarkable.  There is no free intraperitoneal air, portal venous gas, or ascites.    The visualized osseous structures are intact.  There is redemonstration of small, nonspecific scattered sclerotic foci about the pelvis and proximal femurs measure up to 9 mm, possibly bone islands.  Mild degenerative changes of the spine.                                 Medications   ketorolac injection 9.999 mg (9.999 mg Intravenous Given 5/26/22 0359)   ondansetron injection 4 mg (4 mg Intravenous Given 5/26/22 0359)   HYDROmorphone injection 0.5 mg (0.5 mg Intravenous Given 5/26/22  0424)   HYDROcodone-acetaminophen  mg per tablet 1 tablet (1 tablet Oral Given 5/26/22 0610)     Medical Decision Making:   History:   I obtained history from: someone other than patient.       <> Summary of History: Mother see hpi  Old Medical Records: I decided to obtain old medical records.  Initial Assessment:   33 y/o F known 4mm distal rt ureteral stone with exacerbation of pain   Ddx: renal colic, worsening hydronephrosis, UTI/pyelo  Routine blood work, UA  CT stone  Analgesia and anti-emetics  Clinical Tests:   Lab Tests: Ordered and Reviewed  Radiological Study: Ordered and Reviewed  ED Management:  6:15 AM  Pts pain controlled. Slightly elevated wbc but blood work otherwise unremarkable, UA ketone o/w unremarkable. CT abd with moderate to sevre hydro- slightly worse than on 5/19. Stone appears to have minimally migrated. Pt pain controlled at this time. Discussed results with pt and her mother. Pt comfortable with discharge home. Discharged home with toradol. flomax and norco. Follow up with urology. Routine return precautions.                      Clinical Impression:   Final diagnoses:  [N20.1] Ureterolithiasis (Primary)  [N13.2] Hydronephrosis with urinary obstruction due to ureteral calculus          ED Disposition Condition    Discharge Stable        ED Prescriptions     Medication Sig Dispense Start Date End Date Auth. Provider    HYDROcodone-acetaminophen (NORCO) 5-325 mg per tablet Take 1 tablet by mouth every 4 (four) hours as needed for Pain. 18 tablet 5/26/2022  Malka Carrasquillo MD    ketorolac (TORADOL) 10 mg tablet Take 1 tablet (10 mg total) by mouth every 6 (six) hours as needed for Pain. 20 tablet 5/26/2022 5/31/2022 Malka Carrasquillo MD    tamsulosin (FLOMAX) 0.4 mg Cap  (Status: Discontinued) Take 1 capsule (0.4 mg total) by mouth once daily. for 10 days 10 capsule 5/26/2022 5/26/2022 Malka Carrasquillo MD    tamsulosin (FLOMAX) 0.4 mg Cap  (Status: Discontinued) Take 1 capsule (0.4  mg total) by mouth once daily. for 10 days 10 capsule 5/26/2022 5/26/2022 Malka Carrasquillo MD    tamsulosin (FLOMAX) 0.4 mg Cap Take 1 capsule (0.4 mg total) by mouth once daily. for 10 days 10 capsule 5/26/2022 6/5/2022 Malka Carrasquillo MD        Follow-up Information     Follow up With Specialties Details Why Contact Info    Eileen John MD Internal Medicine Schedule an appointment as soon as possible for a visit   [address]  [facility] [address]  [phone]      Shreyas Scott MD Urology Call   [address]  [facility] [address]  [phone]             Malka Carrasquillo MD  05/26/22 1022

## 2022-05-26 NOTE — Clinical Note
"Rosa Martins" Braxton was seen and treated in our emergency department on 5/26/2022.  She may return to work on 05/30/2022.       If you have any questions or concerns, please don't hesitate to call.      Malka Carrasquillo MD"

## 2022-05-26 NOTE — TELEPHONE ENCOUNTER
Patient recently seen in the ED for kidney stones. Patient is scheduled to have them removed on June 8th. She has not passed the stones and has not had any pain since the ER visit. Patient states that her pain returned today and is currently 8/10. Advised per protocol to go to the nearest ED now. Advised the patient to call back with any further questions or if symptoms worsen.      Reason for Disposition   [1] SEVERE pain (e.g., excruciating, pain scale 8-10) AND [2] not improved after pain medications   [1] SEVERE pain (e.g., excruciating, scale 8-10) AND [2] present > 1 hour    Additional Information   Negative: Passed out (i.e., lost consciousness, collapsed and was not responding)   Negative: Shock suspected (e.g., cold/pale/clammy skin, too weak to stand, low BP, rapid pulse)   Negative: Difficult to awaken or acting confused (e.g., disoriented, slurred speech)   Negative: Sounds like a life-threatening emergency to the triager   Negative: Shock suspected (e.g., cold/pale/clammy skin, too weak to stand, low BP, rapid pulse)   Negative: Difficult to awaken or acting confused (e.g., disoriented, slurred speech)   Negative: Sounds like a life-threatening emergency to the triager   Negative: Fever > 104 F (40 C)   Negative: [1] Drinking very little AND [2] dehydration suspected (e.g., no urine > 12 hours, very dry mouth, very lightheaded)   Negative: Patient sounds very sick or weak to the triager    Protocols used: RECENT MEDICAL VISIT FOR ILLNESS FOLLOW-UP CALL-A-, FLANK PAIN-A-AH

## 2022-05-26 NOTE — MEDICAL/APP STUDENT
History     Chief Complaint   Patient presents with    Flank Pain     Pt dx with R kidney stone couple weeks ago. Pt reports that has had any pain until noon yesterday.      33yo F with known R ureteral stone with mild hydroureteronephrosis (5/19/22) presents with R sided flank pain that started 12 hours ago. She reports nausea, denies vomiting/ hematuria/ dysuria/ diarrhea/ pregnancy (IUD)/ fevers/ chills. She has been taking Flomax at home and has not passed the stone. She took ibuprofen, norco, zofran with no relief of pain or nausea. She has a ureteroscopy scheduled for 6/8/22 with Dr. Scott.           Past Medical History:   Diagnosis Date    Abnormal Pap smear of cervix 09/14/2015    (+) Low Grade Squamous Intraepithelial Lesion     Arthritis     left elbow    ASCUS with positive high risk HPV cervical 09/21/2017    Depression     Factor II deficiency     CVA on Nuvaring    HPV in female     Insomnia     Stroke     CVA on Nuvaring. Factor II deficiency        Past Surgical History:   Procedure Laterality Date    CERVICAL BIOPSY  W/ LOOP ELECTRODE EXCISION  11/2017    COLPOSCOPY  10/12/2017    FRACTURE SURGERY      HYSTEROSCOPY N/A 2/26/2021    Procedure: HYSTEROSCOPY;  Surgeon: Radha Nino MD;  [facility]: [facility];  Service: OB/GYN;  Laterality: N/A;    ORIF wrist left Left 5/1/2015    TONSILLECTOMY         Family History   Problem Relation Age of Onset    Hypertension Maternal Grandmother     Diabetes Maternal Grandfather     Diabetes Paternal Uncle     Cancer Neg Hx     Heart disease Neg Hx     Stroke Neg Hx        Social History     Tobacco Use    Smoking status: Never Smoker    Smokeless tobacco: Never Used   Substance Use Topics    Alcohol use: Yes     Comment: social    Drug use: No       Review of Systems   Constitutional: Negative.    HENT: Negative.    Eyes: Negative.    Respiratory: Negative.    Cardiovascular: Negative.    Gastrointestinal: Positive for nausea. Negative  for abdominal pain, diarrhea and vomiting.   Endocrine: Negative.    Genitourinary: Positive for flank pain (R sided). Negative for difficulty urinating, dysuria, hematuria and menstrual problem.   Skin: Negative.    Neurological: Negative.    Psychiatric/Behavioral: Negative.        Physical Exam   /80   Pulse 96   Temp 97.8 °F (36.6 °C) (Oral)   Resp 18   Wt 90.1 kg (198 lb 10.1 oz)   SpO2 98%   BMI 37.53 kg/m²     Physical Exam    Vitals reviewed.  Constitutional: She appears distressed.   Cardiovascular: Normal rate and normal heart sounds.   Pulmonary/Chest: Breath sounds normal.   Abdominal: Abdomen is soft. She exhibits no distension. There is no abdominal tenderness.   Musculoskeletal:         General: No tenderness or edema.     Neurological: She is alert and oriented to person, place, and time.   Skin: Skin is warm and dry.         ED Course     33yo F with previous known R ureteral stone 5/19/22 presents with R flank pain for the last 12 hours. Endorses nausea, denies vomiting/ diarrhea/ dysuria/ hematuria/ pregnancy  (IUD). She has not passed the stone since then. Her pain and nausea has not improved with ibuprofen, norco, zofran. Pain likely due to previous stone and/or worsening hydronephrosis. DDx include but not limited to kidney stone, pyelonephritis, pregnancy.     1. R Flank pain  - CBC, CMP  - UA, pregnancy test  - CT abdomen   - Zofran for nausea  - dilaudid for pain       Ines Jackson, MS4  -[employer]

## 2022-05-26 NOTE — DISCHARGE INSTRUCTIONS
Take toradol and/or norco if needed for pain  Continue flomax as prescribed  Follow up with your urologist  Return to ED for fevers, persistent or worsening pain, vomiting or any other concerns.

## 2022-05-27 DIAGNOSIS — N20.0 NEPHROLITHIASIS: Primary | ICD-10-CM

## 2022-05-29 DIAGNOSIS — N20.0 NEPHROLITHIASIS: Primary | ICD-10-CM

## 2022-05-29 NOTE — PRE-PROCEDURE INSTRUCTIONS
Preop instructions: NPO solids/ milk products after midnight and clears up to 5am (clear liquids are: water, apple juice, Gatorade & Jell-O, black coffee/no milk, cream or creamer), shower instructions, directions, leave all valuables at home, wear comfortable clothes, medication instructions for PM prior & am of procedure explained. Patient stated an understanding.     Patient denies any side effects or issues with anesthesia or sedation.    Patient instructed to call clinic for arrival time

## 2022-05-30 ENCOUNTER — ANESTHESIA (OUTPATIENT)
Dept: SURGERY | Facility: HOSPITAL | Age: 32
End: 2022-05-30
Payer: COMMERCIAL

## 2022-05-30 ENCOUNTER — ANESTHESIA EVENT (OUTPATIENT)
Dept: SURGERY | Facility: HOSPITAL | Age: 32
End: 2022-05-30
Payer: COMMERCIAL

## 2022-05-30 ENCOUNTER — HOSPITAL ENCOUNTER (OUTPATIENT)
Facility: HOSPITAL | Age: 32
Discharge: HOME | End: 2022-05-30
Attending: UROLOGY | Admitting: UROLOGY
Payer: COMMERCIAL

## 2022-05-30 VITALS
SYSTOLIC BLOOD PRESSURE: 109 MMHG | DIASTOLIC BLOOD PRESSURE: 71 MMHG | OXYGEN SATURATION: 95 % | BODY MASS INDEX: 37.41 KG/M2 | RESPIRATION RATE: 16 BRPM | HEART RATE: 87 BPM | WEIGHT: 198 LBS | TEMPERATURE: 98 F

## 2022-05-30 DIAGNOSIS — N20.1 URETERAL STONE: ICD-10-CM

## 2022-05-30 DIAGNOSIS — N20.1 URETEROLITHIASIS: Primary | ICD-10-CM

## 2022-05-30 PROBLEM — N20.0 NEPHROLITHIASIS: Status: ACTIVE | Noted: 2022-05-30

## 2022-05-30 LAB
B-HCG UR QL: NEGATIVE
CTP QC/QA: YES

## 2022-05-30 PROCEDURE — 25000003 PHARM REV CODE 250

## 2022-05-30 PROCEDURE — 27201423 OPTIME MED/SURG SUP & DEVICES STERILE SUPPLY: Performed by: UROLOGY

## 2022-05-30 PROCEDURE — 25000003 PHARM REV CODE 250: Performed by: NURSE ANESTHETIST, CERTIFIED REGISTERED

## 2022-05-30 PROCEDURE — 71000044 HC DOSC ROUTINE RECOVERY FIRST HOUR: Performed by: UROLOGY

## 2022-05-30 PROCEDURE — 37000009 HC ANESTHESIA EA ADD 15 MINS: Performed by: UROLOGY

## 2022-05-30 PROCEDURE — 52332 PR CYSTOSCOPY,INSERT URETERAL STENT: ICD-10-PCS | Mod: 51,RT,, | Performed by: UROLOGY

## 2022-05-30 PROCEDURE — 63600175 PHARM REV CODE 636 W HCPCS: Performed by: NURSE ANESTHETIST, CERTIFIED REGISTERED

## 2022-05-30 PROCEDURE — 82365 CALCULUS SPECTROSCOPY: CPT | Performed by: UROLOGY

## 2022-05-30 PROCEDURE — C1769 GUIDE WIRE: HCPCS | Performed by: UROLOGY

## 2022-05-30 PROCEDURE — 71000015 HC POSTOP RECOV 1ST HR: Performed by: UROLOGY

## 2022-05-30 PROCEDURE — 52352 CYSTOURETERO W/STONE REMOVE: CPT | Mod: RT,,, | Performed by: UROLOGY

## 2022-05-30 PROCEDURE — D9220A PRA ANESTHESIA: ICD-10-PCS | Mod: ANES,,, | Performed by: ANESTHESIOLOGY

## 2022-05-30 PROCEDURE — D9220A PRA ANESTHESIA: Mod: CRNA,,, | Performed by: NURSE ANESTHETIST, CERTIFIED REGISTERED

## 2022-05-30 PROCEDURE — 36000707: Performed by: UROLOGY

## 2022-05-30 PROCEDURE — D9220A PRA ANESTHESIA: ICD-10-PCS | Mod: CRNA,,, | Performed by: NURSE ANESTHETIST, CERTIFIED REGISTERED

## 2022-05-30 PROCEDURE — 37000008 HC ANESTHESIA 1ST 15 MINUTES: Performed by: UROLOGY

## 2022-05-30 PROCEDURE — 52332 CYSTOSCOPY AND TREATMENT: CPT | Mod: 51,RT,, | Performed by: UROLOGY

## 2022-05-30 PROCEDURE — 52352 PR CYSTO/URETERO/PYELOSCOPY, CALCULUS TX: ICD-10-PCS | Mod: RT,,, | Performed by: UROLOGY

## 2022-05-30 PROCEDURE — C2617 STENT, NON-COR, TEM W/O DEL: HCPCS | Performed by: UROLOGY

## 2022-05-30 PROCEDURE — 81025 URINE PREGNANCY TEST: CPT | Performed by: UROLOGY

## 2022-05-30 PROCEDURE — 36000706: Performed by: UROLOGY

## 2022-05-30 PROCEDURE — D9220A PRA ANESTHESIA: Mod: ANES,,, | Performed by: ANESTHESIOLOGY

## 2022-05-30 DEVICE — STENT URETERAL UNIV 6FR 22CM
Type: IMPLANTABLE DEVICE | Site: URETER | Status: NON-FUNCTIONAL
Removed: 2023-02-13

## 2022-05-30 RX ORDER — LIDOCAINE HYDROCHLORIDE 10 MG/ML
1 INJECTION, SOLUTION EPIDURAL; INFILTRATION; INTRACAUDAL; PERINEURAL ONCE
Status: ACTIVE | OUTPATIENT
Start: 2022-05-30

## 2022-05-30 RX ORDER — ONDANSETRON 2 MG/ML
INJECTION INTRAMUSCULAR; INTRAVENOUS
Status: DISCONTINUED | OUTPATIENT
Start: 2022-05-30 | End: 2022-05-30

## 2022-05-30 RX ORDER — FENTANYL CITRATE 50 UG/ML
INJECTION, SOLUTION INTRAMUSCULAR; INTRAVENOUS
Status: DISCONTINUED | OUTPATIENT
Start: 2022-05-30 | End: 2022-05-30

## 2022-05-30 RX ORDER — PHENYLEPHRINE HYDROCHLORIDE 10 MG/ML
INJECTION INTRAVENOUS
Status: DISCONTINUED | OUTPATIENT
Start: 2022-05-30 | End: 2022-05-30

## 2022-05-30 RX ORDER — SODIUM CHLORIDE 9 MG/ML
INJECTION, SOLUTION INTRAVENOUS CONTINUOUS
Status: ACTIVE | OUTPATIENT
Start: 2022-05-30

## 2022-05-30 RX ORDER — LIDOCAINE HYDROCHLORIDE 20 MG/ML
INJECTION INTRAVENOUS
Status: DISCONTINUED | OUTPATIENT
Start: 2022-05-30 | End: 2022-05-30

## 2022-05-30 RX ORDER — SODIUM CHLORIDE 0.9 % (FLUSH) 0.9 %
3 SYRINGE (ML) INJECTION
Status: DISCONTINUED | OUTPATIENT
Start: 2022-05-30 | End: 2022-05-30 | Stop reason: HOSPADM

## 2022-05-30 RX ORDER — CEFAZOLIN SODIUM 1 G/3ML
INJECTION, POWDER, FOR SOLUTION INTRAMUSCULAR; INTRAVENOUS
Status: DISCONTINUED | OUTPATIENT
Start: 2022-05-30 | End: 2022-05-30

## 2022-05-30 RX ORDER — CEFAZOLIN SODIUM/WATER 2 G/20 ML
2 SYRINGE (ML) INTRAVENOUS
Status: ACTIVE | OUTPATIENT
Start: 2022-05-30

## 2022-05-30 RX ORDER — DEXAMETHASONE SODIUM PHOSPHATE 4 MG/ML
INJECTION, SOLUTION INTRA-ARTICULAR; INTRALESIONAL; INTRAMUSCULAR; INTRAVENOUS; SOFT TISSUE
Status: DISCONTINUED | OUTPATIENT
Start: 2022-05-30 | End: 2022-05-30

## 2022-05-30 RX ORDER — ATROPA BELLADONNA AND OPIUM 16.2; 3 MG/1; MG/1
SUPPOSITORY RECTAL
Status: DISCONTINUED | OUTPATIENT
Start: 2022-05-30 | End: 2022-05-30

## 2022-05-30 RX ORDER — ROCURONIUM BROMIDE 10 MG/ML
INJECTION, SOLUTION INTRAVENOUS
Status: DISCONTINUED | OUTPATIENT
Start: 2022-05-30 | End: 2022-05-30

## 2022-05-30 RX ORDER — HYDROMORPHONE HYDROCHLORIDE 1 MG/ML
0.2 INJECTION, SOLUTION INTRAMUSCULAR; INTRAVENOUS; SUBCUTANEOUS EVERY 5 MIN PRN
Status: DISCONTINUED | OUTPATIENT
Start: 2022-05-30 | End: 2022-05-30 | Stop reason: HOSPADM

## 2022-05-30 RX ORDER — MIDAZOLAM HYDROCHLORIDE 1 MG/ML
INJECTION, SOLUTION INTRAMUSCULAR; INTRAVENOUS
Status: DISCONTINUED | OUTPATIENT
Start: 2022-05-30 | End: 2022-05-30

## 2022-05-30 RX ORDER — PROPOFOL 10 MG/ML
VIAL (ML) INTRAVENOUS
Status: DISCONTINUED | OUTPATIENT
Start: 2022-05-30 | End: 2022-05-30

## 2022-05-30 RX ADMIN — PROPOFOL 100 MG: 10 INJECTION, EMULSION INTRAVENOUS at 01:05

## 2022-05-30 RX ADMIN — DEXAMETHASONE SODIUM PHOSPHATE 4 MG: 4 INJECTION, SOLUTION INTRAMUSCULAR; INTRAVENOUS at 01:05

## 2022-05-30 RX ADMIN — LIDOCAINE HYDROCHLORIDE 100 MG: 20 INJECTION, SOLUTION INTRAVENOUS at 01:05

## 2022-05-30 RX ADMIN — FENTANYL CITRATE 50 MCG: 50 INJECTION, SOLUTION INTRAMUSCULAR; INTRAVENOUS at 01:05

## 2022-05-30 RX ADMIN — ONDANSETRON 4 MG: 2 INJECTION, SOLUTION INTRAMUSCULAR; INTRAVENOUS at 01:05

## 2022-05-30 RX ADMIN — ROCURONIUM BROMIDE 50 MG: 10 INJECTION, SOLUTION INTRAVENOUS at 01:05

## 2022-05-30 RX ADMIN — PHENYLEPHRINE HYDROCHLORIDE 200 MCG: 10 INJECTION INTRAVENOUS at 01:05

## 2022-05-30 RX ADMIN — ATROPA BELLADONNA AND OPIUM 30 MG: 16.2; 3 SUPPOSITORY RECTAL at 02:05

## 2022-05-30 RX ADMIN — CEFAZOLIN SODIUM 2 G: 1 INJECTION, POWDER, FOR SOLUTION INTRAMUSCULAR; INTRAVENOUS at 01:05

## 2022-05-30 RX ADMIN — SODIUM CHLORIDE: 0.9 INJECTION, SOLUTION INTRAVENOUS at 01:05

## 2022-05-30 RX ADMIN — PROPOFOL 50 MG: 10 INJECTION, EMULSION INTRAVENOUS at 01:05

## 2022-05-30 RX ADMIN — MIDAZOLAM HYDROCHLORIDE 2 MG: 1 INJECTION, SOLUTION INTRAMUSCULAR; INTRAVENOUS at 01:05

## 2022-05-30 RX ADMIN — SUGAMMADEX 200 MG: 100 INJECTION, SOLUTION INTRAVENOUS at 02:05

## 2022-05-30 RX ADMIN — PHENYLEPHRINE HYDROCHLORIDE 100 MCG: 10 INJECTION INTRAVENOUS at 01:05

## 2022-05-30 RX ADMIN — PROPOFOL 200 MG: 10 INJECTION, EMULSION INTRAVENOUS at 01:05

## 2022-05-30 RX ADMIN — SUGAMMADEX 400 MG: 100 INJECTION, SOLUTION INTRAVENOUS at 02:05

## 2022-05-30 NOTE — INTERVAL H&P NOTE
The patient has been examined and the H&P has been reviewed:    I concur with the findings and no changes have occurred since H&P was written.    Surgery risks, benefits and alternative options discussed and understood by patient/family.  Urine dipstick - negative for all components.         There are no hospital problems to display for this patient.

## 2022-05-30 NOTE — DISCHARGE SUMMARY
[facility]  Discharge Note  Short Stay    Admit Date: 5/30/2022    Discharge Date and Time: 05/30/2022 1:29 PM      Attending Physician: Sharon Zelaya MD     Discharge Provider: Judah Moses MD    Diagnoses:  Active Hospital Problems    Diagnosis  POA    *Nephrolithiasis [N20.0]  Yes      Resolved Hospital Problems   No resolved problems to display.       Discharged Condition: stable    Hospital Course: Patient was admitted for right URS, stone extraction, stent placement and tolerated the procedure well with no complications. She was discharged home in good condition on the same day.       Final Diagnoses: Same as principal problem.    Disposition: Home or Self Care    Follow up/Patient Instructions:    Medications:  Reconciled Home Medications:   Current Discharge Medication List      CONTINUE these medications which have NOT CHANGED    Details   EScitalopram oxalate (LEXAPRO) 10 MG tablet Take 20 mg by mouth once daily.      HYDROcodone-acetaminophen (NORCO) 5-325 mg per tablet Take 1 tablet by mouth every 4 (four) hours as needed for Pain.  Qty: 18 tablet, Refills: 0    Comments: Quantity prescribed more than 7 day supply? Yes, quantity medically necessary      ketorolac (TORADOL) 10 mg tablet Take 1 tablet (10 mg total) by mouth every 6 (six) hours as needed for Pain.  Qty: 20 tablet, Refills: 0      lamoTRIgine (LAMICTAL) 200 MG tablet Take 200 mg by mouth once daily.      MULTIVITAMIN ORAL Take by mouth.      ondansetron (ZOFRAN) 4 MG tablet Take 1 tablet (4 mg total) by mouth every 6 (six) hours as needed for Nausea.  Qty: 12 tablet, Refills: 0      tamsulosin (FLOMAX) 0.4 mg Cap Take 1 capsule (0.4 mg total) by mouth once daily. for 10 days  Qty: 10 capsule, Refills: 0      cholecalciferol, vitamin D3, 1,250 mcg (50,000 unit) capsule TAKE 1 CAPSULE BY MOUTH ONCE A WEEK. TAKE AFTER A FATTY MEAL.           Discharge Procedure Orders   US Kidney   Standing Status: Future Standing Exp.  Date: 05/30/23     Order Specific Question Answer Comments   May the Radiologist modify the order per protocol to meet the clinical needs of the patient? Yes    Release to patient Immediate      Diet Adult Regular     Activity as tolerated      Follow-up Information     Sharon Zelaya MD Follow up in 3 month(s).    Specialty: Urology  Why: f/u ureteroscopy  Contact information:  [address]  [facility] [address]  [phone]

## 2022-05-30 NOTE — OP NOTE
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  Right ureteroscopy  2.  Cystoscopy  3.  Stone basket extraction  4.  Right ureteral stent placement  5.  Fluoro < 1 h    Specimen(s): Stone for analysis    Staff Surgeon: Sharon Zelaya MD    Assistant Surgeon: Judah Moses MD    Anesthesia: General LMA anesthesia    Indications: Rosa Limon is a 32 y.o. female with a right 3 mm UVJ stone, presenting for definitive stone management.  She currently does not have a JJ ureteral stent in place.      Findings:  - Stone encountered at UVJ, basket extracted  - No additional stones encountered  - Moderate edema at UVJ, stent placed with strings    1 baskets were used throughout the case.      Estimated Blood Loss: min    Drains: 6 Fr x 22 cm JJ ureteral stent with strings    Procedure in detail:  After informed consent was obtained, the patient was brought the the cystoscopy suite and placed in the supine position.  SCDs were applied and working.  Anesthesia was administered.  The patient was then placed in the dorsal lithotomy position and prepped and draped in the usual sterile fashion.      A rigid cystoscope in a 22 Fr sheath was introduced into the patient's urethra.  This passed easily.  The entire urethra was  visualized which showed no strictures or masses.  Formal cystoscopy was performed which revealed no masses or lesions suspicious for malignancy, no bladder stones, no bladder diverticuli, no trabeculations.  The ureteral orifices were visualized in the normal anatomic position bilaterally.     A sensor wire was passed up the right ureteral orifice and up into the kidney.  This passed easily and placement was confirmed using fluoro.  The cystoscope was removed keeping the wire in place.     A semirigid ureteroscope was passed into the patient's bladder alongside the wire under direct vision.  It was then passed through the right ureteral orifice alongside the wire.  A stone was encountered at the level of UVJ. A Nitinol tipless basket was introduced through the ureteroscope.  The stone was removed and sent as a specimen.    A 6 Fr x 22 cm JJ ureteral stent with strings was passed over the wire and up into the renal pelvis using fluoro.  When the coil appeared to be in good position in the kidney, the wire was removed under continuous fluoro.  Good coils were seen in the kidney and the bladder using fluoro.      The patient tolerated the procedure well and was transferred to the recovery room in stable condition.      Disposition:  The patient will follow up with Dr. Zelaya in 3 months with a renal US. She can remove her stent on Thursday 6/2/22.     Judah Moses MD

## 2022-05-30 NOTE — ANESTHESIA PREPROCEDURE EVALUATION
05/30/2022  Rosa Limon is a 32 y.o., female.      Pre-op Assessment    I have reviewed the Patient Summary Reports.     I have reviewed the Nursing Notes. I have reviewed the NPO Status.   I have reviewed the Medications.     Review of Systems  Anesthesia Hx:  No problems with previous Anesthesia  History of prior surgery of interest to airway management or planning: Denies Family Hx of Anesthesia complications.   Denies Personal Hx of Anesthesia complications.   Hematology/Oncology:  Hematology Normal   Oncology Normal     EENT/Dental:EENT/Dental Normal   Cardiovascular:  Cardiovascular Normal Exercise tolerance: good  ECG has been reviewed.    Pulmonary:  Pulmonary Normal    Renal/:  Renal/ Normal     Hepatic/GI:  Hepatic/GI Normal    Musculoskeletal:  Musculoskeletal Normal    Neurological:   CVA Headaches    Endocrine:  Endocrine Normal  Obesity / BMI > 30  Dermatological:  Skin Normal    Psych:   depression          Physical Exam  General: Well nourished, Cooperative, Alert and Oriented    Airway:  Mallampati: II   Mouth Opening: Normal  TM Distance: Normal  Tongue: Normal  Neck ROM: Normal ROM    Dental:  Intact        Anesthesia Plan  Type of Anesthesia, risks & benefits discussed:    Anesthesia Type: Gen ETT  Intra-op Monitoring Plan: Standard ASA Monitors  Post Op Pain Control Plan: multimodal analgesia and IV/PO Opioids PRN  Induction:  IV  Airway Plan: Direct, Post-Induction  Informed Consent: Informed consent signed with the Patient and all parties understand the risks and agree with anesthesia plan.  All questions answered.   ASA Score: 2  Day of Surgery Review of History & Physical: H&P Update referred to the surgeon/provider.    Ready For Surgery From Anesthesia Perspective.     .

## 2022-05-30 NOTE — PATIENT INSTRUCTIONS
Post Cystoscopy Instructions  Do not strain to have a bowel movement  No strenuous exercise x 7 days  No driving while you are on narcotic pain medications or if your arroyo  catheter is in place    You may remove your stent on Thursday 6/2/22    You can expect:  To pass stone fragments if you had a stone procedure  Have pain when you void from your stent if you have a stent in place  See blood in your urine if you have a stent in place    If you have a catheter, please return to the ER if your catheter stops draining or you are having abdominal pain.    Call the doctor if:  Temperature is greater than 101F  Persistent vomiting and inability to keep food down  Inability to void if you do not have a catheter

## 2022-05-30 NOTE — TRANSFER OF CARE
Anesthesia Transfer of Care Note    Patient: Roas Limon    Procedure(s) Performed: Procedure(s) (LRB):  CYSTOSCOPY  URETEROSCOPY (Right)  REMOVAL, CALCULUS, URETER, URETEROSCOPIC (Right)  INSERTION, STENT, URETER (Right)    Patient location: [facility]    Anesthesia Type: general    Transport from OR: Transported from OR on 6-10 L/min O2 by face mask with adequate spontaneous ventilation    Post pain: adequate analgesia    Post assessment: no apparent anesthetic complications and tolerated procedure well    Post vital signs: stable    Level of consciousness: awake and oriented    Nausea/Vomiting: no nausea/vomiting    Complications: none    Transfer of care protocol was followed      Last vitals:   Visit Vitals  /86 (BP Location: Left arm, Patient Position: Lying)   Pulse 80   Temp 36.5 °C (97.7 °F) (Temporal)   Resp 16   Wt 89.8 kg (198 lb)   LMP  (LMP Unknown)   SpO2 99%   Breastfeeding No   BMI 37.41 kg/m²

## 2022-05-30 NOTE — ANESTHESIA POSTPROCEDURE EVALUATION
Anesthesia Post Evaluation    Patient: Rosa Limon    Procedure(s) Performed: Procedure(s) (LRB):  CYSTOSCOPY  URETEROSCOPY (Right)  REMOVAL, CALCULUS, URETER, URETEROSCOPIC (Right)  INSERTION, STENT, URETER (Right)    Final Anesthesia Type: general      Patient location during evaluation: PACU  Patient participation: Yes- Able to Participate  Level of consciousness: awake and alert  Post-procedure vital signs: reviewed and stable  Pain control: Pain has been treated.  Airway patency: patent    PONV status: PONV absent or treated.  Anesthetic complications: no      Cardiovascular status: hemodynamically stable  Respiratory status: spontaneous ventilation  Hydration status: euvolemic  Follow-up not needed.          Vitals Value Taken Time   /71 05/30/22 1515   Temp 36.6 °C (97.9 °F) 05/30/22 1515   Pulse 87 05/30/22 1515   Resp 16 05/30/22 1515   SpO2 95 % 05/30/22 1515         No case tracking events are documented in the log.      Pain/Kari Score: Kari Score: 10 (5/30/2022  2:45 PM)

## 2022-05-31 ENCOUNTER — PATIENT MESSAGE (OUTPATIENT)
Dept: UROLOGY | Facility: CLINIC | Age: 32
End: 2022-05-31
Payer: COMMERCIAL

## 2022-06-02 ENCOUNTER — PATIENT MESSAGE (OUTPATIENT)
Dept: UROLOGY | Facility: CLINIC | Age: 32
End: 2022-06-02
Payer: COMMERCIAL

## 2022-06-02 LAB
COMPN STONE: NORMAL
SPECIMEN SOURCE: NORMAL
STONE ANALYSIS IR-IMP: NORMAL

## 2022-07-29 ENCOUNTER — PATIENT MESSAGE (OUTPATIENT)
Dept: UROLOGY | Facility: CLINIC | Age: 32
End: 2022-07-29
Payer: COMMERCIAL

## 2022-08-19 ENCOUNTER — OFFICE VISIT (OUTPATIENT)
Dept: INTERNAL MEDICINE | Facility: CLINIC | Age: 32
End: 2022-08-19
Payer: COMMERCIAL

## 2022-08-19 VITALS
BODY MASS INDEX: 37.64 KG/M2 | HEART RATE: 72 BPM | DIASTOLIC BLOOD PRESSURE: 76 MMHG | WEIGHT: 204.56 LBS | SYSTOLIC BLOOD PRESSURE: 112 MMHG | HEIGHT: 62 IN | OXYGEN SATURATION: 98 %

## 2022-08-19 DIAGNOSIS — E55.9 VITAMIN D DEFICIENCY: ICD-10-CM

## 2022-08-19 DIAGNOSIS — Z00.00 HEALTH CARE MAINTENANCE: Primary | ICD-10-CM

## 2022-08-19 PROCEDURE — 99395 PREV VISIT EST AGE 18-39: CPT | Mod: S$GLB,,, | Performed by: INTERNAL MEDICINE

## 2022-08-19 PROCEDURE — 99999 PR PBB SHADOW E&M-EST. PATIENT-LVL IV: ICD-10-PCS | Mod: PBBFAC,,, | Performed by: INTERNAL MEDICINE

## 2022-08-19 PROCEDURE — 99999 PR PBB SHADOW E&M-EST. PATIENT-LVL IV: CPT | Mod: PBBFAC,,, | Performed by: INTERNAL MEDICINE

## 2022-08-19 PROCEDURE — 99395 PR PREVENTIVE VISIT,EST,18-39: ICD-10-PCS | Mod: S$GLB,,, | Performed by: INTERNAL MEDICINE

## 2022-08-19 RX ORDER — LEVOMILNACIPRAN HYDROCHLORIDE 20 MG/1
1 CAPSULE, EXTENDED RELEASE ORAL DAILY
COMMUNITY
Start: 2022-08-18 | End: 2022-09-28

## 2022-08-19 NOTE — PROGRESS NOTES
"Subjective:       Patient ID: Rosa Limon is a 32 y.o. female.    Chief Complaint: Annual Exam    HPI   Rosa Limon is a 32 y.o. female here for a yearly preventative healthcare visit.   Last saw me in 2018.   She has a hx of sagittal sinus thrombosis and prothrombin mutation.  Hx depression previously on duloxetine 90mg and wellbutrin 450mg xl. More recently on lexapro 20mg and lamictal 200mg daily. Planning to switch to fetzima 20mg but hasn't started yet.   Dr. Moni Dang.     Seen by Dr. Scott for nephrolithiasis; had removed.  Gyn: Dr. Nino; on mirena; pap/hpv 2020 normal.   Doesn't drink enough water.   Review of Systems   Constitutional: Negative for fever.   Respiratory: Negative for shortness of breath.    Cardiovascular: Negative for chest pain.   Musculoskeletal: Negative.    Skin: Negative.        Objective:   /76 (BP Location: Left arm, Patient Position: Sitting, BP Method: Medium (Manual))   Pulse 72   Ht 5' 2" (1.575 m)   Wt 92.8 kg (204 lb 9.4 oz)   SpO2 98%   BMI 37.42 kg/m²      Physical Exam  Constitutional:       General: She is not in acute distress.     Appearance: She is well-developed. She is not diaphoretic.   HENT:      Head: Normocephalic and atraumatic.   Cardiovascular:      Rate and Rhythm: Normal rate and regular rhythm.   Pulmonary:      Effort: Pulmonary effort is normal. No respiratory distress.      Breath sounds: No wheezing or rales.   Skin:     General: Skin is warm and dry.   Neurological:      Mental Status: She is alert and oriented to person, place, and time.   Psychiatric:         Behavior: Behavior normal.         Assessment:       1. Health care maintenance    2. Vitamin D deficiency        Plan:       Rosa was seen today for annual exam.    Diagnoses and all orders for this visit:    Health care maintenance  -     CBC Auto Differential; Future  -     Hemoglobin A1C; Future  -     Comprehensive Metabolic Panel; Future  -     Lipid Panel; " Future  -     TSH; Future  -     Vitamin D; Future    Vitamin D deficiency  -     Vitamin D; Future          Fasting labs  Gyn oct

## 2022-08-24 ENCOUNTER — PATIENT MESSAGE (OUTPATIENT)
Dept: ADMINISTRATIVE | Facility: HOSPITAL | Age: 32
End: 2022-08-24
Payer: COMMERCIAL

## 2022-08-27 ENCOUNTER — LAB VISIT (OUTPATIENT)
Dept: LAB | Facility: HOSPITAL | Age: 32
End: 2022-08-27
Attending: INTERNAL MEDICINE
Payer: COMMERCIAL

## 2022-08-27 DIAGNOSIS — Z00.00 HEALTH CARE MAINTENANCE: ICD-10-CM

## 2022-08-27 DIAGNOSIS — E55.9 VITAMIN D DEFICIENCY: ICD-10-CM

## 2022-08-27 LAB
25(OH)D3+25(OH)D2 SERPL-MCNC: 23 NG/ML (ref 30–96)
ALBUMIN SERPL BCP-MCNC: 3.7 G/DL (ref 3.5–5.2)
ALP SERPL-CCNC: 86 U/L (ref 55–135)
ALT SERPL W/O P-5'-P-CCNC: 25 U/L (ref 10–44)
ANION GAP SERPL CALC-SCNC: 12 MMOL/L (ref 8–16)
AST SERPL-CCNC: 19 U/L (ref 10–40)
BASOPHILS # BLD AUTO: 0.08 K/UL (ref 0–0.2)
BASOPHILS NFR BLD: 1 % (ref 0–1.9)
BILIRUB SERPL-MCNC: 0.4 MG/DL (ref 0.1–1)
BUN SERPL-MCNC: 8 MG/DL (ref 6–20)
CALCIUM SERPL-MCNC: 9 MG/DL (ref 8.7–10.5)
CHLORIDE SERPL-SCNC: 106 MMOL/L (ref 95–110)
CHOLEST SERPL-MCNC: 200 MG/DL (ref 120–199)
CHOLEST/HDLC SERPL: 4.9 {RATIO} (ref 2–5)
CO2 SERPL-SCNC: 23 MMOL/L (ref 23–29)
CREAT SERPL-MCNC: 0.7 MG/DL (ref 0.5–1.4)
DIFFERENTIAL METHOD: NORMAL
EOSINOPHIL # BLD AUTO: 0.2 K/UL (ref 0–0.5)
EOSINOPHIL NFR BLD: 2.6 % (ref 0–8)
ERYTHROCYTE [DISTWIDTH] IN BLOOD BY AUTOMATED COUNT: 13.6 % (ref 11.5–14.5)
EST. GFR  (NO RACE VARIABLE): >60 ML/MIN/1.73 M^2
ESTIMATED AVG GLUCOSE: 100 MG/DL (ref 68–131)
GLUCOSE SERPL-MCNC: 84 MG/DL (ref 70–110)
HBA1C MFR BLD: 5.1 % (ref 4–5.6)
HCT VFR BLD AUTO: 43.9 % (ref 37–48.5)
HDLC SERPL-MCNC: 41 MG/DL (ref 40–75)
HDLC SERPL: 20.5 % (ref 20–50)
HGB BLD-MCNC: 14.4 G/DL (ref 12–16)
IMM GRANULOCYTES # BLD AUTO: 0.03 K/UL (ref 0–0.04)
IMM GRANULOCYTES NFR BLD AUTO: 0.4 % (ref 0–0.5)
LDLC SERPL CALC-MCNC: 119.6 MG/DL (ref 63–159)
LYMPHOCYTES # BLD AUTO: 3.4 K/UL (ref 1–4.8)
LYMPHOCYTES NFR BLD: 41.2 % (ref 18–48)
MCH RBC QN AUTO: 29.1 PG (ref 27–31)
MCHC RBC AUTO-ENTMCNC: 32.8 G/DL (ref 32–36)
MCV RBC AUTO: 89 FL (ref 82–98)
MONOCYTES # BLD AUTO: 0.4 K/UL (ref 0.3–1)
MONOCYTES NFR BLD: 4.9 % (ref 4–15)
NEUTROPHILS # BLD AUTO: 4.1 K/UL (ref 1.8–7.7)
NEUTROPHILS NFR BLD: 49.9 % (ref 38–73)
NONHDLC SERPL-MCNC: 159 MG/DL
NRBC BLD-RTO: 0 /100 WBC
PLATELET # BLD AUTO: 388 K/UL (ref 150–450)
PMV BLD AUTO: 10.4 FL (ref 9.2–12.9)
POTASSIUM SERPL-SCNC: 4.4 MMOL/L (ref 3.5–5.1)
PROT SERPL-MCNC: 6.3 G/DL (ref 6–8.4)
RBC # BLD AUTO: 4.95 M/UL (ref 4–5.4)
SODIUM SERPL-SCNC: 141 MMOL/L (ref 136–145)
TRIGL SERPL-MCNC: 197 MG/DL (ref 30–150)
TSH SERPL DL<=0.005 MIU/L-ACNC: 1.49 UIU/ML (ref 0.4–4)
WBC # BLD AUTO: 8.2 K/UL (ref 3.9–12.7)

## 2022-08-27 PROCEDURE — 80061 LIPID PANEL: CPT | Performed by: INTERNAL MEDICINE

## 2022-08-27 PROCEDURE — 36415 COLL VENOUS BLD VENIPUNCTURE: CPT | Mod: PO | Performed by: INTERNAL MEDICINE

## 2022-08-27 PROCEDURE — 85025 COMPLETE CBC W/AUTO DIFF WBC: CPT | Performed by: INTERNAL MEDICINE

## 2022-08-27 PROCEDURE — 80053 COMPREHEN METABOLIC PANEL: CPT | Performed by: INTERNAL MEDICINE

## 2022-08-27 PROCEDURE — 83036 HEMOGLOBIN GLYCOSYLATED A1C: CPT | Performed by: INTERNAL MEDICINE

## 2022-08-27 PROCEDURE — 82306 VITAMIN D 25 HYDROXY: CPT | Performed by: INTERNAL MEDICINE

## 2022-08-27 PROCEDURE — 84443 ASSAY THYROID STIM HORMONE: CPT | Performed by: INTERNAL MEDICINE

## 2022-09-07 ENCOUNTER — HOSPITAL ENCOUNTER (OUTPATIENT)
Dept: RADIOLOGY | Facility: HOSPITAL | Age: 32
Discharge: HOME | End: 2022-09-07
Attending: STUDENT IN AN ORGANIZED HEALTH CARE EDUCATION/TRAINING PROGRAM
Payer: COMMERCIAL

## 2022-09-07 DIAGNOSIS — N20.1 URETERAL STONE: ICD-10-CM

## 2022-09-07 PROCEDURE — 76770 US KIDNEY: ICD-10-PCS | Mod: 26,,, | Performed by: RADIOLOGY

## 2022-09-07 PROCEDURE — 76770 US EXAM ABDO BACK WALL COMP: CPT | Mod: TC

## 2022-09-07 PROCEDURE — 76770 US EXAM ABDO BACK WALL COMP: CPT | Mod: 26,,, | Performed by: RADIOLOGY

## 2022-09-28 ENCOUNTER — OFFICE VISIT (OUTPATIENT)
Dept: UROLOGY | Facility: CLINIC | Age: 32
End: 2022-09-28
Payer: COMMERCIAL

## 2022-09-28 VITALS
SYSTOLIC BLOOD PRESSURE: 115 MMHG | WEIGHT: 203.94 LBS | BODY MASS INDEX: 37.53 KG/M2 | DIASTOLIC BLOOD PRESSURE: 79 MMHG | HEART RATE: 77 BPM | HEIGHT: 62 IN

## 2022-09-28 DIAGNOSIS — N20.0 NEPHROLITHIASIS: Primary | ICD-10-CM

## 2022-09-28 PROCEDURE — 99213 OFFICE O/P EST LOW 20 MIN: CPT | Mod: S$GLB,,, | Performed by: UROLOGY

## 2022-09-28 PROCEDURE — 99213 PR OFFICE/OUTPT VISIT, EST, LEVL III, 20-29 MIN: ICD-10-PCS | Mod: S$GLB,,, | Performed by: UROLOGY

## 2022-09-28 PROCEDURE — 99999 PR PBB SHADOW E&M-EST. PATIENT-LVL III: CPT | Mod: PBBFAC,,, | Performed by: UROLOGY

## 2022-09-28 PROCEDURE — 99999 PR PBB SHADOW E&M-EST. PATIENT-LVL III: ICD-10-PCS | Mod: PBBFAC,,, | Performed by: UROLOGY

## 2022-09-28 RX ORDER — ESCITALOPRAM OXALATE 20 MG
20 TABLET ORAL DAILY
COMMUNITY
Start: 2022-09-09 | End: 2023-01-20 | Stop reason: CLARIF

## 2022-09-28 NOTE — PROGRESS NOTES
"Urology - [facility]  Clinic Note    SUBJECTIVE:     Chief Complaint: follow up    History of Present Illness:  Rosa Limon is a 32 y.o. female who presents to clinic for follow up kidney stones. She is established to our clinic to our clinic. Referral from No ref. provider found   S/p ureteroscopy for right UVJ stone 2022. Also had 1mm left renal stone.   100% ca oxalate.     2022  Procedure(s) Performed:   1.  Right ureteroscopy  2.  Cystoscopy  3.  Stone basket extraction  4.  Right ureteral stent placement  5.  Fluoro < 1 h    Anticoagulation:  No    OBJECTIVE:     Estimated body mass index is 37.3 kg/m² as calculated from the following:    Height as of this encounter: 5' 2" (1.575 m).    Weight as of this encounter: 92.5 kg (203 lb 14.8 oz).    Vital Signs (Most Recent)  Pulse: 77 (22 1215)  BP: 115/79 (22 1215)    Physical Exam  Vitals reviewed.   Pulmonary:      Effort: Pulmonary effort is normal.   Neurological:      Mental Status: She is alert.       Lab Results   Component Value Date    BUN 8 2022    CREATININE 0.7 2022    WBC 8.20 2022    HGB 14.4 2022    HCT 43.9 2022     2022    AST 19 2022    ALT 25 2022    ALKPHOS 86 2022    ALBUMIN 3.7 2022    HGBA1C 5.1 2022          Imagin22 ultrasound    Right kidney: The right kidney measures 10.5 cm. No cortical thinning. No loss of corticomedullary distinction. Resistive index measures 0.7.  No mass. No renal stone. No hydronephrosis.     Left kidney: The left kidney measures 11.4 cm. No cortical thinning. No loss of corticomedullary distinction. Resistive index measures 0.6.  No mass. No renal stone. No hydronephrosis.     Impression:     No significant abnormality.    CT 2022 showed a 1mm stone in left kidney and 4mm stone in right UVJ      ASSESSMENT     1. Nephrolithiasis      PLAN:   Diagnoses and all orders for this " visit:    Nephrolithiasis  -     US Kidney; Future    F/u 1 year.   Discussed that low vit D can contribute to kidney stones.   Push fluids.   General risk factors for kidney stones and the conservative measures to prevent kidney stones in the future were discussed with the patient in detail.  The patient was encouraged to drink 2-3 liters of water a day, have lots of fruits and veggies, limit oxalate and salt in the diet, limit iced tea and lenora, to avoid Tums and Rolaids, to avoid unnecessary supplements and NSAIDS.       Sharon Zelaya MD

## 2022-10-06 ENCOUNTER — PATIENT MESSAGE (OUTPATIENT)
Dept: INTERNAL MEDICINE | Facility: CLINIC | Age: 32
End: 2022-10-06
Payer: COMMERCIAL

## 2022-10-06 ENCOUNTER — NURSE TRIAGE (OUTPATIENT)
Dept: ADMINISTRATIVE | Facility: CLINIC | Age: 32
End: 2022-10-06
Payer: COMMERCIAL

## 2022-10-06 DIAGNOSIS — K62.5 RECTAL BLEEDING: Primary | ICD-10-CM

## 2022-10-14 ENCOUNTER — OFFICE VISIT (OUTPATIENT)
Dept: URGENT CARE | Facility: CLINIC | Age: 32
End: 2022-10-14
Payer: COMMERCIAL

## 2022-10-14 VITALS
RESPIRATION RATE: 20 BRPM | WEIGHT: 203 LBS | OXYGEN SATURATION: 99 % | DIASTOLIC BLOOD PRESSURE: 97 MMHG | BODY MASS INDEX: 37.13 KG/M2 | HEART RATE: 95 BPM | TEMPERATURE: 98 F | SYSTOLIC BLOOD PRESSURE: 139 MMHG

## 2022-10-14 DIAGNOSIS — S93.601A FOOT SPRAIN, RIGHT, INITIAL ENCOUNTER: Primary | ICD-10-CM

## 2022-10-14 PROCEDURE — 99214 OFFICE O/P EST MOD 30 MIN: CPT | Mod: S$GLB,,, | Performed by: FAMILY MEDICINE

## 2022-10-14 PROCEDURE — 99214 PR OFFICE/OUTPT VISIT, EST, LEVL IV, 30-39 MIN: ICD-10-PCS | Mod: S$GLB,,, | Performed by: FAMILY MEDICINE

## 2022-10-14 PROCEDURE — 73630 X-RAY EXAM OF FOOT: CPT | Mod: FY,RT,S$GLB, | Performed by: RADIOLOGY

## 2022-10-14 PROCEDURE — 73630 XR FOOT COMPLETE 3 VIEW RIGHT: ICD-10-PCS | Mod: FY,RT,S$GLB, | Performed by: RADIOLOGY

## 2022-10-14 RX ORDER — IBUPROFEN 600 MG/1
600 TABLET ORAL EVERY 8 HOURS PRN
Qty: 30 TABLET | Refills: 0 | Status: SHIPPED | OUTPATIENT
Start: 2022-10-14 | End: 2023-03-06

## 2022-10-14 NOTE — PROGRESS NOTES
Subjective:       Patient ID: Rosa Limon is a 32 y.o. female.    Vitals:  weight is 92.1 kg (203 lb). Her temperature is 98 °F (36.7 °C). Her blood pressure is 139/97 (abnormal) and her pulse is 95. Her respiration is 20 and oxygen saturation is 99%.     Chief Complaint: Ankle Injury    Pt is complaining of falling at home today and hurting her right ankle.     Ankle Injury   The incident occurred less than 1 hour ago. The incident occurred at home. The injury mechanism was a fall. The pain is present in the right foot. The pain is at a severity of 5/10. Associated symptoms include an inability to bear weight. Pertinent negatives include no loss of motion, loss of sensation, muscle weakness, numbness or tingling.     Neurological:  Negative for numbness.     Objective:      Physical Exam   Constitutional: She does not appear ill. No distress. obesity  HENT:   Head: Normocephalic and atraumatic.   Cardiovascular: Normal rate, regular rhythm, normal heart sounds and normal pulses.   Pulmonary/Chest: Effort normal and breath sounds normal.   Abdominal: Normal appearance.   Musculoskeletal:         General: Swelling (lateral aspect right foot), tenderness and signs of injury present.   Neurological: She is alert.   Skin: bruising (lateral aspect of dorsum right foot)   Nursing note and vitals reviewed.      Assessment:       1. Foot sprain, right, initial encounter          Plan:         Foot sprain, right, initial encounter  -     X-Ray Foot Complete 3 view Right; Future; Expected date: 10/14/2022  -     ibuprofen (ADVIL,MOTRIN) 600 MG tablet; Take 1 tablet (600 mg total) by mouth every 8 (eight) hours as needed for Pain (with food).  Dispense: 30 tablet; Refill: 0     RICE, minimal weight bearing and rest. Ace wrap applied. RTC prn worsening symptoms

## 2022-10-14 NOTE — LETTER
October 14, 2022      [facility]  [address] [address]  Phone: [phone]  Fax: [phone]       Patient: Rosa Limon   YOB: 1990  Date of Visit: 10/14/2022    To Whom It May Concern:    Mary Limon  was at [facility] on 10/14/2022. The patient may return to work/school on 10/17/2022 with no restrictions. If you have any questions or concerns, or if I can be of further assistance, please do not hesitate to contact me.    Sincerely,            Harvey Ritchie MD

## 2022-10-21 ENCOUNTER — PATIENT MESSAGE (OUTPATIENT)
Dept: UROLOGY | Facility: CLINIC | Age: 32
End: 2022-10-21
Payer: COMMERCIAL

## 2022-10-21 ENCOUNTER — TELEPHONE (OUTPATIENT)
Dept: UROLOGY | Facility: CLINIC | Age: 32
End: 2022-10-21
Payer: COMMERCIAL

## 2022-10-21 NOTE — TELEPHONE ENCOUNTER
"I spoke with patient, who stated "she started having kidney pain in my left side last night that was starting to become severe. It started again a little while ago. I have some pain medication from my last kidney stone, but I am really worried that now I have one in my left kidney. I'm not sure what to do and can't really afford to go back to the ER." Patient also stated she isn't in much pain now, I advised her not to drink any thing but water for the weekend and if the pain worsened to either come to the ER, she can also call [phone] and ask to speak with urology resident on call and they will call her back to advise her.       "

## 2022-10-31 ENCOUNTER — PATIENT MESSAGE (OUTPATIENT)
Dept: UROLOGY | Facility: CLINIC | Age: 32
End: 2022-10-31
Payer: COMMERCIAL

## 2022-11-02 ENCOUNTER — HOSPITAL ENCOUNTER (OUTPATIENT)
Dept: RADIOLOGY | Facility: HOSPITAL | Age: 32
Discharge: HOME | End: 2022-11-02
Attending: UROLOGY
Payer: COMMERCIAL

## 2022-11-02 DIAGNOSIS — N20.0 NEPHROLITHIASIS: ICD-10-CM

## 2022-11-02 PROCEDURE — 76770 US EXAM ABDO BACK WALL COMP: CPT | Mod: TC

## 2022-11-02 PROCEDURE — 76770 US KIDNEY: ICD-10-PCS | Mod: 26,,, | Performed by: RADIOLOGY

## 2022-11-02 PROCEDURE — 76770 US EXAM ABDO BACK WALL COMP: CPT | Mod: 26,,, | Performed by: RADIOLOGY

## 2022-11-03 ENCOUNTER — OFFICE VISIT (OUTPATIENT)
Dept: UROLOGY | Facility: CLINIC | Age: 32
End: 2022-11-03
Payer: COMMERCIAL

## 2022-11-03 ENCOUNTER — HOSPITAL ENCOUNTER (OUTPATIENT)
Dept: RADIOLOGY | Facility: HOSPITAL | Age: 32
Discharge: HOME | End: 2022-11-03
Attending: NURSE PRACTITIONER
Payer: COMMERCIAL

## 2022-11-03 VITALS
DIASTOLIC BLOOD PRESSURE: 72 MMHG | SYSTOLIC BLOOD PRESSURE: 102 MMHG | HEIGHT: 62 IN | HEART RATE: 111 BPM | WEIGHT: 185.19 LBS | BODY MASS INDEX: 34.08 KG/M2

## 2022-11-03 DIAGNOSIS — N20.0 KIDNEY STONE: ICD-10-CM

## 2022-11-03 DIAGNOSIS — R10.12 LEFT UPPER QUADRANT ABDOMINAL PAIN: ICD-10-CM

## 2022-11-03 DIAGNOSIS — R10.12 LEFT UPPER QUADRANT ABDOMINAL PAIN: Primary | ICD-10-CM

## 2022-11-03 LAB
BACTERIA #/AREA URNS AUTO: ABNORMAL /HPF
MICROSCOPIC COMMENT: ABNORMAL
RBC #/AREA URNS AUTO: 0 /HPF (ref 0–4)
SQUAMOUS #/AREA URNS AUTO: 4 /HPF
WBC #/AREA URNS AUTO: 14 /HPF (ref 0–5)

## 2022-11-03 PROCEDURE — 74176 CT ABD & PELVIS W/O CONTRAST: CPT | Mod: TC

## 2022-11-03 PROCEDURE — 51701 INSERT BLADDER CATHETER: CPT | Mod: S$GLB,,, | Performed by: NURSE PRACTITIONER

## 2022-11-03 PROCEDURE — 99999 PR PBB SHADOW E&M-EST. PATIENT-LVL IV: ICD-10-PCS | Mod: PBBFAC,,, | Performed by: NURSE PRACTITIONER

## 2022-11-03 PROCEDURE — 87086 URINE CULTURE/COLONY COUNT: CPT | Performed by: NURSE PRACTITIONER

## 2022-11-03 PROCEDURE — 99999 PR PBB SHADOW E&M-EST. PATIENT-LVL IV: CPT | Mod: PBBFAC,,, | Performed by: NURSE PRACTITIONER

## 2022-11-03 PROCEDURE — 81001 URINALYSIS AUTO W/SCOPE: CPT | Performed by: NURSE PRACTITIONER

## 2022-11-03 PROCEDURE — 74176 CT ABD & PELVIS W/O CONTRAST: CPT | Mod: 26,,, | Performed by: RADIOLOGY

## 2022-11-03 PROCEDURE — 51701 PR INSERTION OF NON-INDWELLING BLADDER CATHETERIZATION FOR RESIDUAL UR: ICD-10-PCS | Mod: S$GLB,,, | Performed by: NURSE PRACTITIONER

## 2022-11-03 PROCEDURE — 99214 PR OFFICE/OUTPT VISIT, EST, LEVL IV, 30-39 MIN: ICD-10-PCS | Mod: 25,S$GLB,, | Performed by: NURSE PRACTITIONER

## 2022-11-03 PROCEDURE — 99214 OFFICE O/P EST MOD 30 MIN: CPT | Mod: 25,S$GLB,, | Performed by: NURSE PRACTITIONER

## 2022-11-03 PROCEDURE — 74176 CT RENAL STONE STUDY ABD PELVIS WO: ICD-10-PCS | Mod: 26,,, | Performed by: RADIOLOGY

## 2022-11-03 RX ORDER — HYDROCODONE BITARTRATE AND ACETAMINOPHEN 5; 325 MG/1; MG/1
1 TABLET ORAL EVERY 6 HOURS PRN
Qty: 28 TABLET | Refills: 0 | Status: SHIPPED | OUTPATIENT
Start: 2022-11-03 | End: 2023-03-06

## 2022-11-03 RX ORDER — TAMSULOSIN HYDROCHLORIDE 0.4 MG/1
0.4 CAPSULE ORAL DAILY
Qty: 30 CAPSULE | Refills: 11 | Status: SHIPPED | OUTPATIENT
Start: 2022-11-03 | End: 2023-01-31

## 2022-11-03 RX ORDER — KETOROLAC TROMETHAMINE 10 MG/1
10 TABLET, FILM COATED ORAL EVERY 6 HOURS
Qty: 20 TABLET | Refills: 0 | Status: CANCELLED | OUTPATIENT
Start: 2022-11-03 | End: 2022-11-08

## 2022-11-03 NOTE — LETTER
November 3, 2022      [facility] [address]  [address]  [facility] [address]  Phone: [phone]       Patient: Rosa Limon   YOB: 1990  Date of Visit: 11/03/2022    To Whom It May Concern:    Mary Limon  was at [facility] on 11/03/2022. The patient may return to work/school on 11/4/22 with no restrictions. If you have any questions or concerns, or if I can be of further assistance, please do not hesitate to contact me.    Sincerely,    Sharon Mata, NP

## 2022-11-03 NOTE — PROGRESS NOTES
CHIEF COMPLAINT:    Mrs. Limon is a 32 y.o. female presenting for   Chief Complaint   Patient presents with    renal u/s     .  PRESENTING ILLNESS:    Rosa Limon is a 32 y.o. female with a PMH of factor II deficiency, cerebral venous sinus thrombosis who presents for nephrolithiasis.    Established patient of urology department. Presents today due to significant left flank pain.  Has history of kidney stones. S/p ureteroscopy for right UVJ stone 5/2022. Also had 1mm left renal stone at the time.  The stone analysis 100% ca oxalate. Pain started about 2 weeks ago, but has intensified.  Denies fever, hematuria, or vomiting.  Kidney ultrasound reviewed with mild left hydronephrosis.       REVIEW OF SYSTEMS:    Review of Systems   Constitutional:  Negative for chills and fever.   Respiratory:  Negative for shortness of breath.    Cardiovascular:  Negative for chest pain.   Gastrointestinal:  Positive for nausea. Negative for constipation and diarrhea.   Genitourinary:  Positive for flank pain. Negative for dysuria, frequency, hematuria and urgency.   Neurological:  Negative for dizziness and weakness.      PATIENT HISTORY:    Past Medical History:   Diagnosis Date    Abnormal Pap smear of cervix 09/14/2015    (+) Low Grade Squamous Intraepithelial Lesion     Arthritis     left elbow    ASCUS with positive high risk HPV cervical 09/21/2017    Depression     Factor II deficiency     CVA on Nuvaring    HPV in female     Insomnia     Stroke     CVA on Nuvaring. Factor II deficiency        Family History   Problem Relation Age of Onset    Hypertension Maternal Grandmother     Diabetes Maternal Grandfather     Diabetes Paternal Uncle     Cancer Neg Hx     Heart disease Neg Hx     Stroke Neg Hx        Allergies:  Metoclopramide    Medications:    Current Outpatient Medications:     ibuprofen (ADVIL,MOTRIN) 600 MG tablet, Take 1 tablet (600 mg total) by mouth every 8 (eight) hours as needed for Pain (with food).,  Disp: 30 tablet, Rfl: 0    lamoTRIgine (LAMICTAL) 200 MG tablet, Take 200 mg by mouth once daily., Disp: , Rfl:     tirzepatide 2.5 mg/0.5 mL PnIj, , Disp: , Rfl:     EScitalopram oxalate (LEXAPRO) 10 MG tablet, Take 20 mg by mouth once daily., Disp: , Rfl:     HYDROcodone-acetaminophen (NORCO) 5-325 mg per tablet, Take 1 tablet by mouth every 6 (six) hours as needed for Pain., Disp: 28 tablet, Rfl: 0    LEXAPRO 20 mg tablet, Take 20 mg by mouth once daily., Disp: , Rfl:     tamsulosin (FLOMAX) 0.4 mg Cap, Take 1 capsule (0.4 mg total) by mouth once daily., Disp: 30 capsule, Rfl: 11  No current facility-administered medications for this visit.    Facility-Administered Medications Ordered in Other Visits:     0.9%  NaCl infusion, , Intravenous, Continuous, Acosta Molina MD, Stopped at 05/30/22 1417    ceFAZolin in sterile water 2 gram/20 mL IV syringe 2,000 mg, 2 g, Intravenous, On Call Procedure, Acosta Molina MD    dextrose 5 % in lactated ringers infusion, , Intravenous, Continuous, Radha Nino MD    doxycycline (VIBRAMYCIN) 100mg in dextrose 5% 250 mL IVPB (ready to mix), 100 mg, Intravenous, On Call Procedure, Radha Nino MD, 100 mg at 02/26/21 0833    LIDOcaine (PF) 10 mg/ml (1%) injection 10 mg, 1 mL, Intradermal, Once, Acosta Molina MD    PHYSICAL EXAMINATION:    Physical Exam  Vitals and nursing note reviewed. Exam conducted with a chaperone present.   Constitutional:       Appearance: Normal appearance. She is well-developed. She is ill-appearing.   HENT:      Head: Normocephalic and atraumatic.   Eyes:      Pupils: Pupils are equal, round, and reactive to light.   Pulmonary:      Effort: Pulmonary effort is normal.   Abdominal:      Tenderness: There is left CVA tenderness.   Genitourinary:     General: Normal vulva.      Exam position: Supine.      Urethra: No prolapse, urethral pain, urethral swelling or urethral lesion.      Vagina: Normal.      Rectum: Normal.   Musculoskeletal:          General: Normal range of motion.      Cervical back: Normal range of motion.   Skin:     General: Skin is warm and dry.   Neurological:      Mental Status: She is alert and oriented to person, place, and time.   Psychiatric:         Behavior: Behavior normal.     Consent verbally obtained.  Betadine prep was applied to the urethral meatus. An in and out cath was performed after voiding.  The PVR was 20 ml.        LABS:    U/a dipstick performed in office today: negative for infection, + bld, + ketones    IMPRESSION:    Encounter Diagnoses   Name Primary?    Left upper quadrant abdominal pain Yes    Kidney stone        Kidney ultrasound 11/2/22:  Impression:     Mild left-sided hydronephrosis without definite urine jet identified.    PLAN:    Problem List Items Addressed This Visit    None  Visit Diagnoses       Left upper quadrant abdominal pain    -  Primary    Relevant Orders    CT Renal Stone Study ABD Pelvis WO    Kidney stone        Relevant Medications    tamsulosin (FLOMAX) 0.4 mg Cap    HYDROcodone-acetaminophen (NORCO) 5-325 mg per tablet    Other Relevant Orders    CT Renal Stone Study ABD Pelvis WO    Urinalysis Microscopic    Urine culture            1. Kidney stone   Drink 2-3 liters of water daily  Continue flomax  Continue norco as needed  Send urine for culture and analysis  Strain every urine.  Strainer provided.    Patient instructed to bring in stone for stone analysis.  Sterile cup provided.    Will repeat CT RSS if patient has not passed stone in 2 weeks.  Informed patient that CT RSS will be canceled if he passes the stone prior to the visit.        Get prompt medical attention if any of the following occur:  Severe pain that returns and not relieved by pain medicines  Repeated vomiting or unable to keep down fluids  Weakness, dizziness or fainting  Fever of 101.4ºF (38ºC) or higher, or as directed by your healthcare provider  Blood clots in urine  Foul smelling or cloudy urine  Unable to pass  urine for 8 hours or increasing bladder pressure    2. Rtc for f/u or sooner prn    Sharon Mata NP

## 2022-11-04 ENCOUNTER — TELEPHONE (OUTPATIENT)
Dept: UROLOGY | Facility: CLINIC | Age: 32
End: 2022-11-04
Payer: COMMERCIAL

## 2022-11-04 LAB — BACTERIA UR CULT: NO GROWTH

## 2022-11-04 NOTE — TELEPHONE ENCOUNTER
Called Ms. Limon to discuss CT scan results.  3 mm stone noted in left ureter.  Call back number given for questions.

## 2022-11-08 ENCOUNTER — PATIENT MESSAGE (OUTPATIENT)
Dept: UROLOGY | Facility: CLINIC | Age: 32
End: 2022-11-08
Payer: COMMERCIAL

## 2022-11-08 DIAGNOSIS — R10.9 ABDOMINAL PAIN, UNSPECIFIED ABDOMINAL LOCATION: Primary | ICD-10-CM

## 2022-11-08 NOTE — TELEPHONE ENCOUNTER
Patient has 3mm stone. She is straining urine.   No pain since Friday.   Wants to try to pass it.

## 2023-01-11 ENCOUNTER — TELEPHONE (OUTPATIENT)
Dept: UROLOGY | Facility: CLINIC | Age: 33
End: 2023-01-11
Payer: COMMERCIAL

## 2023-01-11 ENCOUNTER — PATIENT MESSAGE (OUTPATIENT)
Dept: SURGERY | Facility: HOSPITAL | Age: 33
End: 2023-01-11
Payer: COMMERCIAL

## 2023-01-11 ENCOUNTER — PATIENT MESSAGE (OUTPATIENT)
Dept: UROLOGY | Facility: CLINIC | Age: 33
End: 2023-01-11
Payer: COMMERCIAL

## 2023-01-11 DIAGNOSIS — N20.1 LEFT URETERAL STONE: Primary | ICD-10-CM

## 2023-01-12 ENCOUNTER — PATIENT MESSAGE (OUTPATIENT)
Dept: SURGERY | Facility: HOSPITAL | Age: 33
End: 2023-01-12
Payer: COMMERCIAL

## 2023-01-12 ENCOUNTER — TELEPHONE (OUTPATIENT)
Dept: UROLOGY | Facility: CLINIC | Age: 33
End: 2023-01-12
Payer: COMMERCIAL

## 2023-01-12 NOTE — TELEPHONE ENCOUNTER
I emailed her urine culture order for her to print out to bring to [facility] [place].    Test results from ER visit  (Newest Message First)  Rosa RITCHIE Staff (supporting Sharon Zelaya MD) 7 minutes ago (1:46 PM)     It looks like my white blood cell count is elevated... The hospital didn't review these test results with me and said nothing about that. Doesn't that indicate an infection?        Rosa RITCHIE Staff (supporting Sharon Zelaya MD) 9 minutes ago (1:44 PM)     I linked my [employer] account for the hospital here in [place] ([facility]) with my account for [facility] so you should be able to see my test results for the ER visit on 1/11.

## 2023-01-20 ENCOUNTER — PATIENT MESSAGE (OUTPATIENT)
Dept: SURGERY | Facility: HOSPITAL | Age: 33
End: 2023-01-20
Payer: COMMERCIAL

## 2023-01-20 ENCOUNTER — TELEPHONE (OUTPATIENT)
Dept: PREADMISSION TESTING | Facility: HOSPITAL | Age: 33
End: 2023-01-20
Payer: COMMERCIAL

## 2023-01-20 DIAGNOSIS — Z01.818 PREOP TESTING: Primary | ICD-10-CM

## 2023-01-20 RX ORDER — ONDANSETRON 4 MG/1
4 TABLET, ORALLY DISINTEGRATING ORAL
COMMUNITY
Start: 2023-01-11 | End: 2023-01-31

## 2023-01-20 RX ORDER — COVID-19 MOLECULAR TEST ASSAY
KIT MISCELLANEOUS
COMMUNITY
Start: 2022-11-13 | End: 2024-02-20

## 2023-01-20 RX ORDER — SERTRALINE HYDROCHLORIDE 100 MG/1
200 TABLET, FILM COATED ORAL
COMMUNITY
Start: 2022-12-26 | End: 2023-06-13

## 2023-01-20 RX ORDER — SERTRALINE HYDROCHLORIDE 50 MG/1
50 TABLET, FILM COATED ORAL
COMMUNITY
Start: 2022-11-07 | End: 2023-03-06

## 2023-01-20 RX ORDER — VILAZODONE HYDROCHLORIDE 20 MG/1
1 TABLET ORAL
COMMUNITY
Start: 2022-10-13 | End: 2023-01-20 | Stop reason: CLARIF

## 2023-01-20 RX ORDER — VILAZODONE HYDROCHLORIDE 10 MG/1
10 TABLET ORAL
COMMUNITY
Start: 2022-09-29 | End: 2023-01-20 | Stop reason: CLARIF

## 2023-01-20 NOTE — ANESTHESIA PAT ROS NOTE
01/20/2023  Rosa Limon is a 33 y.o., female.      Pre-op Assessment          Review of Systems  Anesthesia Hx:  No problems with previous Anesthesia             Denies Family Hx of Anesthesia complications.    Denies Personal Hx of Anesthesia complications.                    Social:  Former Smoker, No Alcohol Use       Hematology/Oncology:  Hematology Normal   Oncology Normal                                   EENT/Dental:   Wears glasses          Cardiovascular:  Cardiovascular Normal Exercise tolerance: good                  Walking daily/Housework/Climbs stairs daily/Mows lawn                         Pulmonary:  Pulmonary Normal                       Renal/:  Chronic Renal Disease renal calculi  Left ureteral stone             Hepatic/GI:  Hepatic/GI Normal                 Musculoskeletal:  Musculoskeletal Normal                OB/GYN/PEDS:  None           Neurological:   CVA   Headaches     2016                            Endocrine:        Obesity / BMI > 30  Dermatological:  Skin Normal    Psych:  Psychiatric History anxiety depression             Sarai McclellanRN 1/20/23      Anesthesia Assessment: Preoperative EQUATION    Planned Procedure: Procedure(s) (LRB):  CYSTOURETEROSCOPY, WITH HOLMIUM LASER LITHOTRIPSY OF URETERAL CALCULUS AND STENT INSERTION (Left)  CYSTOSCOPY, WITH RETROGRADE PYELOGRAM (N/A)  Requested Anesthesia Type:General  Surgeon: Sharon Zelaya MD  Service: Urology  Known or anticipated Date of Surgery:1/30/2023    Surgeon notes: reviewed and Left ureteral stone    Previous anesthesia records: 5/30/22-Cystoscopy Ureteroscopy-Right/Removal, Calculus, Ureter Ureteroscopic-Right/ Insertion, Stent, Ureter-Right-General -no apparent anesthetic complications and tolerated procedure well-no nausea/vomiting    Anesthesia Hx:  No problems with previous Anesthesia  History  of prior surgery of interest to airway management or planning: Denies Family Hx of Anesthesia complications.   Denies Personal Hx of Anesthesia complications.     Airway:  Mallampati: II   Mouth Opening: Normal  TM Distance: Normal  Tongue: Normal  Neck ROM: Normal ROM    Last PCP note: 3-6 months ago , within [facility] , 8/19/22-Eileen John MD-IM-Health care maintenance+1 more Dx-Annual Exam  Subspecialty notes:  OP GYN visit    Other important co-morbidities:  Stroke/Arthritis & left ureteral stone     Medical History    Diagnosis Date Comment Source   Abnormal Pap smear of cervix 09/14/2015 (+) Low Grade Squamous Intraepithelial Lesion     Arthritis  left elbow    ASCUS with positive high risk HPV cervical 09/21/2017     Depression      Factor II deficiency  CVA on Nuvaring    HPV in female      Insomnia      Stroke  CVA on Nuvaring. Factor II deficiency       Tests already available:  Results have been reviewed.       Plan: Phone pending     Testing:  BMP        Patient  has previously scheduled Medical Appointment:Appointment on 1/23/23, prior to the surgery date.     Navigation: Phone Completed                        Tests Scheduled. BMP -(Pt. Out of town & will need to have lab -BMP done on DOS-(ordered-[employer])             Consults scheduled.None needed at this time.                 Sarai Mcclellan RN  1/20/23

## 2023-01-24 ENCOUNTER — PATIENT MESSAGE (OUTPATIENT)
Dept: SURGERY | Facility: HOSPITAL | Age: 33
End: 2023-01-24
Payer: COMMERCIAL

## 2023-01-27 ENCOUNTER — PATIENT MESSAGE (OUTPATIENT)
Dept: SURGERY | Facility: HOSPITAL | Age: 33
End: 2023-01-27
Payer: COMMERCIAL

## 2023-01-27 ENCOUNTER — TELEPHONE (OUTPATIENT)
Dept: UROLOGY | Facility: CLINIC | Age: 33
End: 2023-01-27
Payer: COMMERCIAL

## 2023-01-27 NOTE — TELEPHONE ENCOUNTER
Called pt to confirm arrival time of 10:40am for procedure on 1/30/23. Gave pt NPO instructions and gave pt opportunity to ask questions. Pt verbalized understanding.

## 2023-01-27 NOTE — TELEPHONE ENCOUNTER
I spoke with patient and advised her that I see where there is dialogue between 2 nurses discussing when patient will get labs before her surgery : January 20, 2023  Brenda Thomson  to Sarai Mcclellan RN    BB    2:42 PM  Pt labs will be done morning of Surgery        BB    2:42 PM  Brenda Thomson contacted Rosa Limon     I advised patient to come earlier than what she was told to ask the surgery nurses, patient agreed.

## 2023-01-30 ENCOUNTER — TELEPHONE (OUTPATIENT)
Dept: UROLOGY | Facility: CLINIC | Age: 33
End: 2023-01-30
Payer: COMMERCIAL

## 2023-01-30 ENCOUNTER — HOSPITAL ENCOUNTER (OUTPATIENT)
Facility: HOSPITAL | Age: 33
Discharge: HOME | End: 2023-01-30
Attending: UROLOGY | Admitting: UROLOGY
Payer: COMMERCIAL

## 2023-01-30 ENCOUNTER — ANESTHESIA EVENT (OUTPATIENT)
Dept: SURGERY | Facility: HOSPITAL | Age: 33
End: 2023-01-30
Payer: COMMERCIAL

## 2023-01-30 ENCOUNTER — PATIENT MESSAGE (OUTPATIENT)
Dept: SURGERY | Facility: HOSPITAL | Age: 33
End: 2023-01-30

## 2023-01-30 ENCOUNTER — ANESTHESIA (OUTPATIENT)
Dept: SURGERY | Facility: HOSPITAL | Age: 33
End: 2023-01-30
Payer: COMMERCIAL

## 2023-01-30 VITALS
OXYGEN SATURATION: 97 % | DIASTOLIC BLOOD PRESSURE: 65 MMHG | HEART RATE: 94 BPM | TEMPERATURE: 98 F | SYSTOLIC BLOOD PRESSURE: 108 MMHG | BODY MASS INDEX: 33.79 KG/M2 | RESPIRATION RATE: 22 BRPM | HEIGHT: 61 IN | WEIGHT: 179 LBS

## 2023-01-30 DIAGNOSIS — N20.9 UROLITHIASIS: Primary | ICD-10-CM

## 2023-01-30 DIAGNOSIS — Z01.818 PREOP TESTING: ICD-10-CM

## 2023-01-30 DIAGNOSIS — N32.9 BLADDER DISORDER: ICD-10-CM

## 2023-01-30 DIAGNOSIS — N20.1 LEFT URETERAL STONE: Primary | ICD-10-CM

## 2023-01-30 LAB
ANION GAP SERPL CALC-SCNC: 12 MMOL/L (ref 8–16)
B-HCG UR QL: NEGATIVE
BUN SERPL-MCNC: 7 MG/DL (ref 6–20)
CALCIUM SERPL-MCNC: 9.3 MG/DL (ref 8.7–10.5)
CHLORIDE SERPL-SCNC: 108 MMOL/L (ref 95–110)
CO2 SERPL-SCNC: 19 MMOL/L (ref 23–29)
CREAT SERPL-MCNC: 0.7 MG/DL (ref 0.5–1.4)
CTP QC/QA: YES
EST. GFR  (NO RACE VARIABLE): >60 ML/MIN/1.73 M^2
GLUCOSE SERPL-MCNC: 78 MG/DL (ref 70–110)
POTASSIUM SERPL-SCNC: 4 MMOL/L (ref 3.5–5.1)
SODIUM SERPL-SCNC: 139 MMOL/L (ref 136–145)

## 2023-01-30 PROCEDURE — 71000044 HC DOSC ROUTINE RECOVERY FIRST HOUR: Performed by: UROLOGY

## 2023-01-30 PROCEDURE — D9220A PRA ANESTHESIA: ICD-10-PCS | Mod: CRNA,,, | Performed by: NURSE ANESTHETIST, CERTIFIED REGISTERED

## 2023-01-30 PROCEDURE — 25000003 PHARM REV CODE 250: Performed by: UROLOGY

## 2023-01-30 PROCEDURE — 52351 CYSTOURETERO & OR PYELOSCOPE: CPT | Mod: ,,, | Performed by: UROLOGY

## 2023-01-30 PROCEDURE — 52351 PR CYSTO/URETERO/PYELOSCOPY, DX: ICD-10-PCS | Mod: ,,, | Performed by: UROLOGY

## 2023-01-30 PROCEDURE — D9220A PRA ANESTHESIA: Mod: ANES,,, | Performed by: ANESTHESIOLOGY

## 2023-01-30 PROCEDURE — 52332 PR CYSTOSCOPY,INSERT URETERAL STENT: ICD-10-PCS | Mod: 51,LT,, | Performed by: UROLOGY

## 2023-01-30 PROCEDURE — 63600175 PHARM REV CODE 636 W HCPCS: Performed by: NURSE ANESTHETIST, CERTIFIED REGISTERED

## 2023-01-30 PROCEDURE — 25000003 PHARM REV CODE 250: Performed by: NURSE ANESTHETIST, CERTIFIED REGISTERED

## 2023-01-30 PROCEDURE — D9220A PRA ANESTHESIA: ICD-10-PCS | Mod: ANES,,, | Performed by: ANESTHESIOLOGY

## 2023-01-30 PROCEDURE — C2617 STENT, NON-COR, TEM W/O DEL: HCPCS | Performed by: UROLOGY

## 2023-01-30 PROCEDURE — C1769 GUIDE WIRE: HCPCS | Performed by: UROLOGY

## 2023-01-30 PROCEDURE — 36000706: Performed by: UROLOGY

## 2023-01-30 PROCEDURE — 80048 BASIC METABOLIC PNL TOTAL CA: CPT | Performed by: ANESTHESIOLOGY

## 2023-01-30 PROCEDURE — 37000008 HC ANESTHESIA 1ST 15 MINUTES: Performed by: UROLOGY

## 2023-01-30 PROCEDURE — 52332 CYSTOSCOPY AND TREATMENT: CPT | Mod: 51,LT,, | Performed by: UROLOGY

## 2023-01-30 PROCEDURE — D9220A PRA ANESTHESIA: Mod: CRNA,,, | Performed by: NURSE ANESTHETIST, CERTIFIED REGISTERED

## 2023-01-30 PROCEDURE — 37000009 HC ANESTHESIA EA ADD 15 MINS: Performed by: UROLOGY

## 2023-01-30 PROCEDURE — 36000707: Performed by: UROLOGY

## 2023-01-30 PROCEDURE — 71000015 HC POSTOP RECOV 1ST HR: Performed by: UROLOGY

## 2023-01-30 PROCEDURE — 81025 URINE PREGNANCY TEST: CPT | Performed by: UROLOGY

## 2023-01-30 DEVICE — STENT URETERAL UNIV 6FR 22CM
Type: IMPLANTABLE DEVICE | Site: URETER | Status: NON-FUNCTIONAL
Removed: 2023-02-13

## 2023-01-30 RX ORDER — LIDOCAINE HYDROCHLORIDE 20 MG/ML
JELLY TOPICAL
Status: DISCONTINUED | OUTPATIENT
Start: 2023-01-30 | End: 2023-01-30 | Stop reason: HOSPADM

## 2023-01-30 RX ORDER — CEFAZOLIN SODIUM 1 G/3ML
INJECTION, POWDER, FOR SOLUTION INTRAMUSCULAR; INTRAVENOUS
Status: DISCONTINUED | OUTPATIENT
Start: 2023-01-30 | End: 2023-01-30

## 2023-01-30 RX ORDER — FENTANYL CITRATE 50 UG/ML
INJECTION, SOLUTION INTRAMUSCULAR; INTRAVENOUS
Status: DISCONTINUED | OUTPATIENT
Start: 2023-01-30 | End: 2023-01-30

## 2023-01-30 RX ORDER — OXYBUTYNIN CHLORIDE 5 MG/1
5 TABLET ORAL 3 TIMES DAILY PRN
Qty: 63 TABLET | Refills: 0 | Status: SHIPPED | OUTPATIENT
Start: 2023-01-30 | End: 2023-01-31

## 2023-01-30 RX ORDER — PROPOFOL 10 MG/ML
VIAL (ML) INTRAVENOUS
Status: DISCONTINUED | OUTPATIENT
Start: 2023-01-30 | End: 2023-01-30

## 2023-01-30 RX ORDER — OXYCODONE HYDROCHLORIDE 5 MG/1
5 TABLET ORAL EVERY 6 HOURS PRN
Qty: 15 TABLET | Refills: 0 | Status: SHIPPED | OUTPATIENT
Start: 2023-01-30 | End: 2023-02-01

## 2023-01-30 RX ORDER — DEXAMETHASONE SODIUM PHOSPHATE 4 MG/ML
INJECTION, SOLUTION INTRA-ARTICULAR; INTRALESIONAL; INTRAMUSCULAR; INTRAVENOUS; SOFT TISSUE
Status: DISCONTINUED | OUTPATIENT
Start: 2023-01-30 | End: 2023-01-30

## 2023-01-30 RX ORDER — LIDOCAINE HYDROCHLORIDE 10 MG/ML
INJECTION, SOLUTION INTRAVENOUS
Status: DISCONTINUED | OUTPATIENT
Start: 2023-01-30 | End: 2023-01-30

## 2023-01-30 RX ORDER — PHENYLEPHRINE HYDROCHLORIDE 10 MG/ML
INJECTION INTRAVENOUS
Status: DISCONTINUED | OUTPATIENT
Start: 2023-01-30 | End: 2023-01-30

## 2023-01-30 RX ORDER — ROCURONIUM BROMIDE 10 MG/ML
INJECTION, SOLUTION INTRAVENOUS
Status: DISCONTINUED | OUTPATIENT
Start: 2023-01-30 | End: 2023-01-30

## 2023-01-30 RX ORDER — ONDANSETRON 2 MG/ML
INJECTION INTRAMUSCULAR; INTRAVENOUS
Status: DISCONTINUED | OUTPATIENT
Start: 2023-01-30 | End: 2023-01-30

## 2023-01-30 RX ORDER — MIDAZOLAM HYDROCHLORIDE 1 MG/ML
INJECTION, SOLUTION INTRAMUSCULAR; INTRAVENOUS
Status: DISCONTINUED | OUTPATIENT
Start: 2023-01-30 | End: 2023-01-30

## 2023-01-30 RX ORDER — PHENAZOPYRIDINE HYDROCHLORIDE 200 MG/1
200 TABLET, FILM COATED ORAL 3 TIMES DAILY PRN
Qty: 63 TABLET | Refills: 0 | Status: SHIPPED | OUTPATIENT
Start: 2023-01-30 | End: 2023-01-31

## 2023-01-30 RX ORDER — TAMSULOSIN HYDROCHLORIDE 0.4 MG/1
0.4 CAPSULE ORAL NIGHTLY
Qty: 30 CAPSULE | Refills: 0 | Status: SHIPPED | OUTPATIENT
Start: 2023-01-30 | End: 2023-01-31

## 2023-01-30 RX ORDER — KETOROLAC TROMETHAMINE 10 MG/1
10 TABLET, FILM COATED ORAL EVERY 6 HOURS PRN
Qty: 30 TABLET | Refills: 0 | Status: SHIPPED | OUTPATIENT
Start: 2023-01-30 | End: 2023-01-30 | Stop reason: SDUPTHER

## 2023-01-30 RX ORDER — KETOROLAC TROMETHAMINE 10 MG/1
10 TABLET, FILM COATED ORAL EVERY 6 HOURS PRN
Qty: 30 TABLET | Refills: 0 | Status: SHIPPED | OUTPATIENT
Start: 2023-01-30 | End: 2023-03-06

## 2023-01-30 RX ADMIN — GLYCOPYRROLATE 0.2 MG: 0.2 INJECTION, SOLUTION INTRAMUSCULAR; INTRAVENOUS at 12:01

## 2023-01-30 RX ADMIN — PHENYLEPHRINE HYDROCHLORIDE 100 MCG: 10 INJECTION INTRAVENOUS at 12:01

## 2023-01-30 RX ADMIN — ONDANSETRON 4 MG: 2 INJECTION INTRAMUSCULAR; INTRAVENOUS at 12:01

## 2023-01-30 RX ADMIN — SODIUM CHLORIDE: 0.9 INJECTION, SOLUTION INTRAVENOUS at 11:01

## 2023-01-30 RX ADMIN — MIDAZOLAM HYDROCHLORIDE 2 MG: 1 INJECTION, SOLUTION INTRAMUSCULAR; INTRAVENOUS at 11:01

## 2023-01-30 RX ADMIN — ROCURONIUM BROMIDE 40 MG: 10 INJECTION INTRAVENOUS at 12:01

## 2023-01-30 RX ADMIN — CEFAZOLIN 2 G: 330 INJECTION, POWDER, FOR SOLUTION INTRAMUSCULAR; INTRAVENOUS at 12:01

## 2023-01-30 RX ADMIN — PROPOFOL 180 MG: 10 INJECTION, EMULSION INTRAVENOUS at 12:01

## 2023-01-30 RX ADMIN — DEXAMETHASONE SODIUM PHOSPHATE 4 MG: 4 INJECTION, SOLUTION INTRAMUSCULAR; INTRAVENOUS at 12:01

## 2023-01-30 RX ADMIN — FENTANYL CITRATE 100 MCG: 50 INJECTION, SOLUTION INTRAMUSCULAR; INTRAVENOUS at 12:01

## 2023-01-30 RX ADMIN — LIDOCAINE HYDROCHLORIDE 100 MG: 10 INJECTION, SOLUTION INTRAVENOUS at 12:01

## 2023-01-30 RX ADMIN — SUGAMMADEX 200 MG: 100 INJECTION, SOLUTION INTRAVENOUS at 12:01

## 2023-01-30 NOTE — OP NOTE
[facility]  Operative Note    Date: 01/30/2023    Pre-Op Diagnosis: Left proximal ureteral stone    Post-Op Diagnosis: same    Procedure(s) Performed:    1. Cystoscopy with left ureteral JJ stent placement  2. Ureteroscopy, left  3. Fluoroscopy < 1 h    Specimen(s): None    Staff Surgeon: Sharon Zelaya MD    Assistant Surgeon: MD Judah Medina MD -  (TA)    Anesthesia: Monitored Local Anesthesia with Sedation    Indications: Rosa Limon is a 33 y.o. female with left proximal ureteral stone presenting for definitive stone management.    Findings:  Tight distal left ureter, left ureteral stent insertion    Estimated Blood Loss: min    Drains:  6 Russian x 22 cm left JJ ureteral stent without strings    Procedure in Detail:  After risks, benefits and possible complications of the procedure were explained, the patient elected to undergo the procedure and informed consent was obtained. All questions were answered in the gerber-operative area. The patient was transferred to the cystoscopy suite and placed on the fluoroscopy table in the supine position.  SCDs were applied and working. Time out was performed, gerber-procedural antibiotics were given. Anesthesia was administered.  After adequate anesthesia the patient was placed in dorsal lithotomy position and prepped and draped in the usual sterile fashion.     A rigid cystoscope in a 22 Fr sheath was introduced into the patients bladder per urethra. This passed easily.  The entire urethra was visualized and revealed no strictures or masses.  Cystoscopy was performed which showed the right and left ureteral orifices in the normal anatomic position.  There were no bladder tumors, no  trabeculations, and no stones.      Our attention was turned to the patient's left ureteral orifice.  A motion wire was advanced up the left ureteral orifice to the level of the expected renal pelvis.  This was confirmed  using fluoroscopy.     The ureteroscope was assembled and inserted into the bladder per urethra. It was able to traverse the left UO, but could not enter the distal ureter due to small caliber. The decision was made to abort the intended stone procedure, place an indwelling ureteral stent and return for definitive stone management in the near future. The ureteroscope was removed, leaving the motion wire in place. This was confirmed with fluoroscopy.    We then passed a 6 Fr x 22 cm JJ ureteral stent without strings over the wire to the level of the renal pelvis under direct vision as well as flouroscopy. The guide wire was removed.  A 180 degree coil was observed in the renal pelvis as well as the bladder using fluoroscopy.  A 180 degree coil was also seen using direct visualization in the bladder.     The patient tolerated the procedure well and was transferred to the recovery room in stable condition.      Disposition: The patient will follow up in approximately 2 weeks for definitive stone management..      Benitez Key MD

## 2023-01-30 NOTE — ANESTHESIA PROCEDURE NOTES
Intubation    Date/Time: 1/30/2023 12:07 PM  Performed by: Elisabeth Anaya CRNA  Authorized by: Sharon Dotson MD     Intubation:     Induction:  Intravenous    Intubated:  Postinduction    Mask Ventilation:  Easy mask    Attempts:  1    Attempted By:  Student    Method of Intubation:  Video laryngoscopy    Blade:  Montalvo 3    Laryngeal View Grade: Grade I - full view of cords      Difficult Airway Encountered?: No      Complications:  None    Airway Device:  Oral endotracheal tube    Airway Device Size:  7.0    Style/Cuff Inflation:  Cuffed (inflated to minimal occlusive pressure)    Tube secured:  21    Secured at:  The teeth    Placement Verified By:  Capnometry    Complicating Factors:  None    Findings Post-Intubation:  BS equal bilateral and atraumatic/condition of teeth unchanged

## 2023-01-30 NOTE — TRANSFER OF CARE
"Anesthesia Transfer of Care Note    Patient: Rosa Limon    Procedure(s) Performed: Procedure(s) (LRB):  CYSTOSCOPY (N/A)  INSERTION, STENT, URETER (Left)  URETEROSCOPY (Left)    Patient location: PACU    Anesthesia Type: general    Transport from OR: Transported from OR on 6-10 L/min O2 by face mask with adequate spontaneous ventilation    Post pain: adequate analgesia    Post assessment: no apparent anesthetic complications and tolerated procedure well    Post vital signs: stable    Level of consciousness: sedated and responds to stimulation    Nausea/Vomiting: no nausea/vomiting    Complications: none    Transfer of care protocol was followed      Last vitals:   Visit Vitals  /70 (BP Location: Left arm)   Pulse 79   Temp 36.6 °C (97.9 °F) (Temporal)   Resp 16   Ht 5' 1" (1.549 m)   Wt 81.2 kg (179 lb 0.2 oz)   SpO2 99%   Breastfeeding No   BMI 33.82 kg/m²     "

## 2023-01-30 NOTE — ANESTHESIA POSTPROCEDURE EVALUATION
Anesthesia Post Evaluation    Patient: Rosa Limon    Procedure(s) Performed: Procedure(s) (LRB):  CYSTOSCOPY (N/A)  INSERTION, STENT, URETER (Left)  URETEROSCOPY (Left)    Final Anesthesia Type: general      Patient location during evaluation: PACU  Patient participation: Yes- Able to Participate  Level of consciousness: awake and alert and oriented  Pain management: adequate  Airway patency: patent    PONV status at discharge: No PONV  Anesthetic complications: no      Cardiovascular status: blood pressure returned to baseline and hemodynamically stable  Respiratory status: unassisted  Hydration status: euvolemic  Follow-up not needed.          Vitals Value Taken Time   /65 01/30/23 1318   Temp 36.6 °C (97.8 °F) 01/30/23 1240   Pulse 92 01/30/23 1319   Resp 22 01/30/23 1315   SpO2 97 % 01/30/23 1319   Vitals shown include unvalidated device data.      No case tracking events are documented in the log.      Pain/Kari Score: Kari Score: 8 (1/30/2023 12:40 PM)

## 2023-01-30 NOTE — ANESTHESIA PREPROCEDURE EVALUATION
01/30/2023  Rosa Limon is a 33 y.o., female.      Pre-op Assessment    I have reviewed the Patient Summary Reports.          Review of Systems  Anesthesia Hx:  No problems with previous Anesthesia  Denies Family Hx of Anesthesia complications.   Denies Personal Hx of Anesthesia complications.   Social:  Former Smoker, No Alcohol Use    Hematology/Oncology:  Hematology Normal   Oncology Normal     EENT/Dental:   Wears glasses   Cardiovascular:  Cardiovascular Normal Exercise tolerance: good  Walking daily/Housework/Climbs stairs daily/Mows lawn   Pulmonary:  Pulmonary Normal    Renal/:   Chronic Renal Disease renal calculi Left ureteral stone   Hepatic/GI:  Hepatic/GI Normal    Musculoskeletal:  Musculoskeletal Normal    OB/GYN/PEDS:  None   Neurological:   CVA Headaches 2016   Endocrine:  Obesity / BMI > 30  Dermatological:  Skin Normal    Psych:   Psychiatric History anxiety depression          Physical Exam  General: Well nourished    Airway:  Mallampati: II   Mouth Opening: Normal  TM Distance: Normal  Tongue: Normal  Neck ROM: Normal ROM    Sarai McclellanRN 1/20/23      Anesthesia Assessment: Preoperative EQUATION    Planned Procedure: Procedure(s) (LRB):  CYSTOSCOPY (N/A)  INSERTION, STENT, URETER (Left)  URETEROSCOPY (Left)  Requested Anesthesia Type:General  Surgeon: Sharon Zelaya MD  Service: Urology  Known or anticipated Date of Surgery:1/30/2023    Surgeon notes: reviewed and Left ureteral stone    Previous anesthesia records: 5/30/22-Cystoscopy Ureteroscopy-Right/Removal, Calculus, Ureter Ureteroscopic-Right/ Insertion, Stent, Ureter-Right-General -no apparent anesthetic complications and tolerated procedure well-no nausea/vomiting    Anesthesia Hx:  No problems with previous Anesthesia  History of prior surgery of interest to airway management or planning: Denies Family Hx  of Anesthesia complications.   Denies Personal Hx of Anesthesia complications.     Airway:  Mallampati: II   Mouth Opening: Normal  TM Distance: Normal  Tongue: Normal  Neck ROM: Normal ROM    Last PCP note: 3-6 months ago , within [facility] , 8/19/22-Eileen John MD-IM-Health care maintenance+1 more Dx-Annual Exam  Subspecialty notes:  OP GYN visit    Other important co-morbidities:  Stroke/Arthritis & left ureteral stone     Medical History    Diagnosis Date Comment Source   Abnormal Pap smear of cervix 09/14/2015 (+) Low Grade Squamous Intraepithelial Lesion     Arthritis  left elbow    ASCUS with positive high risk HPV cervical 09/21/2017     Depression      Factor II deficiency  CVA on Nuvaring    HPV in female      Insomnia      Stroke  CVA on Nuvaring. Factor II deficiency       Tests already available:  Results have been reviewed.       Plan: Phone pending     Testing:  BMP        Patient  has previously scheduled Medical Appointment:Appointment on 1/23/23, prior to the surgery date.     Navigation: Phone Completed                        Tests Scheduled. BMP -(Pt. Out of town & will need to have lab -BMP done on DOS-(ordered-[employer])             Consults scheduled.None needed at this time.                 Sarai Mcclellan RN  1/20/23                 Anesthesia Plan  Type of Anesthesia, risks & benefits discussed:    Anesthesia Type: Gen ETT, Gen Natural Airway  Intra-op Monitoring Plan: Standard ASA Monitors  Post Op Pain Control Plan: multimodal analgesia  Induction:  IV  Airway Plan: Direct  Informed Consent: Informed consent signed with the Patient and all parties understand the risks and agree with anesthesia plan.  All questions answered.   ASA Score: 2  Day of Surgery Review of History & Physical: H&P Update referred to the surgeon/provider.    Ready For Surgery From Anesthesia Perspective.       .

## 2023-01-30 NOTE — H&P (VIEW-ONLY)
"Urology - [facility]  Clinic Note    SUBJECTIVE:     Chief Complaint: follow up    History of Present Illness:  Rosa Limon is a 32 y.o. female who presents to clinic for follow up kidney stones. She is established to our clinic to our clinic. Referral from No ref. provider found   S/p ureteroscopy for right UVJ stone 5/2022. Also had 1mm left renal stone.   100% ca oxalate.     No pain since ED visit 1/10/23. Denies passage of stone.    5/2022  Procedure(s) Performed:   1.  Right ureteroscopy  2.  Cystoscopy  3.  Stone basket extraction  4.  Right ureteral stent placement  5.  Fluoro < 1 h    Anticoagulation:  No    OBJECTIVE:     Estimated body mass index is 37.3 kg/m² as calculated from the following:    Height as of this encounter: 5' 2" (1.575 m).    Weight as of this encounter: 92.5 kg (203 lb 14.8 oz).    Vital Signs (Most Recent)  Pulse: 77 (09/28/22 1215)  BP: 115/79 (09/28/22 1215)    Physical Exam  Vitals and nursing note reviewed.   Constitutional:       General: She is not in acute distress.  HENT:      Head: Atraumatic.      Nose: Nose normal.   Eyes:      Extraocular Movements: Extraocular movements intact.   Cardiovascular:      Rate and Rhythm: Normal rate.   Pulmonary:      Effort: Pulmonary effort is normal.   Abdominal:      General: Abdomen is flat. There is no distension.      Tenderness: There is no abdominal tenderness. There is no right CVA tenderness or left CVA tenderness.   Musculoskeletal:         General: Normal range of motion.      Cervical back: Normal range of motion.   Skin:     Coloration: Skin is not jaundiced.   Neurological:      General: No focal deficit present.      Mental Status: She is alert and oriented to person, place, and time.   Psychiatric:         Mood and Affect: Mood normal.         Behavior: Behavior normal.       Lab Results   Component Value Date    BUN 8 08/27/2022    CREATININE 0.7 08/27/2022    WBC 8.20 08/27/2022    HGB 14.4 08/27/2022 "    HCT 43.9 2022     2022    AST 19 2022    ALT 25 2022    ALKPHOS 86 2022    ALBUMIN 3.7 2022    HGBA1C 5.1 2022          Imagin22 ultrasound    Right kidney: The right kidney measures 10.5 cm. No cortical thinning. No loss of corticomedullary distinction. Resistive index measures 0.7.  No mass. No renal stone. No hydronephrosis.     Left kidney: The left kidney measures 11.4 cm. No cortical thinning. No loss of corticomedullary distinction. Resistive index measures 0.6.  No mass. No renal stone. No hydronephrosis.     Impression:     No significant abnormality.    CT 2022 showed a 1mm stone in left kidney and 4mm stone in right UVJ      ASSESSMENT     1. Nephrolithiasis      PLAN:   Diagnoses and all orders for this visit:    Nephrolithiasis  -     US Kidney; Future      Interval h&p reviewed and unchanged from previous encounter. Urine dipped: Negative for all components. Nonconcerning for infection.    The patient has stopped all blood thinners, including aspirin for 7 days.    Patient consented and would like to proceed with the procedure. Correct side identified and marked with radiopaque marker.    No new imaging since 2022.

## 2023-01-30 NOTE — PATIENT INSTRUCTIONS
Post Cystoscopy Instructions  Do not strain to have a bowel movement  No strenuous exercise x 7 days  No driving while you are on narcotic pain medications or if your arroyo  catheter is in place    You can expect:  To pass stone fragments if you had a stone procedure  Have pain when you void from your stent if you have a stent in place  See blood in your urine if you have a stent in place    If you have a catheter, please return to the ER if your catheter stops draining or you are having abdominal pain.    Call the doctor if:  Temperature is greater than 101F  Persistent vomiting and inability to keep food down  Inability to void if you do not have a catheter

## 2023-01-30 NOTE — DISCHARGE SUMMARY
[facility] - [facility] (1st Fl)  Discharge Note  Short Stay    Procedure(s) (LRB):  CYSTOSCOPY (N/A)  INSERTION, STENT, URETER (Left)  URETEROSCOPY (Left)      OUTCOME: Patient tolerated treatment/procedure well without complication and is now ready for discharge.    DISPOSITION: Home or Self Care    FINAL DIAGNOSIS:  <principal problem not specified>    FOLLOWUP: In clinic    DISCHARGE INSTRUCTIONS:    Discharge Procedure Orders   Notify your health care provider if you experience any of the following:  temperature >100.4     Notify your health care provider if you experience any of the following:  persistent nausea and vomiting or diarrhea     Notify your health care provider if you experience any of the following:  severe uncontrolled pain     Notify your health care provider if you experience any of the following:  redness, tenderness, or signs of infection (pain, swelling, redness, odor or green/yellow discharge around incision site)     Notify your health care provider if you experience any of the following:  worsening rash     Notify your health care provider if you experience any of the following:  persistent dizziness, light-headedness, or visual disturbances        TIME SPENT ON DISCHARGE: 10   minutes

## 2023-01-31 ENCOUNTER — TELEPHONE (OUTPATIENT)
Dept: UROLOGY | Facility: CLINIC | Age: 33
End: 2023-01-31
Payer: COMMERCIAL

## 2023-01-31 RX ORDER — OXYBUTYNIN CHLORIDE 5 MG/1
5 TABLET ORAL 3 TIMES DAILY PRN
Qty: 42 TABLET | Refills: 0 | Status: SHIPPED | OUTPATIENT
Start: 2023-01-31 | End: 2023-02-14

## 2023-01-31 RX ORDER — ONDANSETRON 4 MG/1
4 TABLET, ORALLY DISINTEGRATING ORAL EVERY 8 HOURS PRN
Qty: 15 TABLET | Refills: 0 | Status: SHIPPED | OUTPATIENT
Start: 2023-01-31 | End: 2023-03-06

## 2023-01-31 RX ORDER — PHENAZOPYRIDINE HYDROCHLORIDE 200 MG/1
200 TABLET, FILM COATED ORAL 3 TIMES DAILY PRN
Qty: 42 TABLET | Refills: 0 | Status: SHIPPED | OUTPATIENT
Start: 2023-01-31 | End: 2023-02-14

## 2023-01-31 RX ORDER — TAMSULOSIN HYDROCHLORIDE 0.4 MG/1
0.4 CAPSULE ORAL NIGHTLY
Qty: 14 CAPSULE | Refills: 0 | Status: SHIPPED | OUTPATIENT
Start: 2023-01-31 | End: 2023-02-14

## 2023-01-31 NOTE — PROGRESS NOTES
Patient requesting that post operative medications be sent to [facility] in [place].    Previous scripts cancelled. Zofran, Pyridium, Ditropan and Flomax refilled.

## 2023-01-31 NOTE — TELEPHONE ENCOUNTER
I spoke with patient, who stated she doesn't have any of the other medicines that was prescribed for her except the ketorolac 10mg.    I advised her that I will send Dr. Key a message to send her medicines to her requested pharmacist.    SATYA Marquis , Please cancel the medication that you ordered on this patient and sent them to [facility] .Patient  wants them to go to [facility] pharmacy in [place] : Ondansetron 4mg, , oxybutynin 5 mg , phenazopyridine (PYRIDIUM) 200 MG tablet  Thank you so much !!

## 2023-01-31 NOTE — TELEPHONE ENCOUNTER
I spoke with patient , and advised her to make sure she is drinking at least 2 liters of water daily if she isn't on fluid restriction. I advised patient that her stent discomfort and spasms are to be expected, and to take the medicines as prescribed. If her symptoms worsen or she starts vomiting and can't keep anything down, or starts running a fever to go to the nearest ER patient agreed.

## 2023-02-01 DIAGNOSIS — N20.0 NEPHROLITHIASIS: Primary | ICD-10-CM

## 2023-02-01 RX ORDER — OXYCODONE HYDROCHLORIDE 5 MG/1
5 TABLET ORAL EVERY 6 HOURS PRN
Qty: 12 TABLET | Refills: 0 | Status: SHIPPED | OUTPATIENT
Start: 2023-02-01 | End: 2023-03-06

## 2023-02-02 ENCOUNTER — TELEPHONE (OUTPATIENT)
Dept: UROLOGY | Facility: CLINIC | Age: 33
End: 2023-02-02
Payer: COMMERCIAL

## 2023-02-05 ENCOUNTER — PATIENT MESSAGE (OUTPATIENT)
Dept: SURGERY | Facility: HOSPITAL | Age: 33
End: 2023-02-05
Payer: COMMERCIAL

## 2023-02-06 ENCOUNTER — TELEPHONE (OUTPATIENT)
Dept: UROLOGY | Facility: CLINIC | Age: 33
End: 2023-02-06
Payer: COMMERCIAL

## 2023-02-06 ENCOUNTER — PATIENT MESSAGE (OUTPATIENT)
Dept: SURGERY | Facility: HOSPITAL | Age: 33
End: 2023-02-06
Payer: COMMERCIAL

## 2023-02-07 ENCOUNTER — LAB VISIT (OUTPATIENT)
Dept: LAB | Facility: HOSPITAL | Age: 33
End: 2023-02-07
Attending: UROLOGY
Payer: COMMERCIAL

## 2023-02-07 DIAGNOSIS — N20.1 LEFT URETERAL STONE: ICD-10-CM

## 2023-02-07 PROCEDURE — 87086 URINE CULTURE/COLONY COUNT: CPT | Performed by: UROLOGY

## 2023-02-07 PROCEDURE — 87077 CULTURE AEROBIC IDENTIFY: CPT | Performed by: UROLOGY

## 2023-02-07 PROCEDURE — 87088 URINE BACTERIA CULTURE: CPT | Performed by: UROLOGY

## 2023-02-07 PROCEDURE — 87186 SC STD MICRODIL/AGAR DIL: CPT | Performed by: UROLOGY

## 2023-02-07 NOTE — TELEPHONE ENCOUNTER
I spoke with patient's mother, who stated patient's blood pressure was pretty low and she advised her not to take the pyridium because she read that it could cause orthostatic hypotension she will only take the azo standard OTC. Patient was given chicken bouillon to drink and her blood pressure came up to 106/64, and she felt better. I spoke with Woodrow Grimes MD who stated to have patient try to take the Toradol more than the Narco which could reduce her blood pressure to far. Patient agreed, patient  stated she will continue to increase her fluid intake making sure to drink at least 2 liters of water a day.    Hi Dr Zelaya,     Today I've started to feel lightheaded and dizzy when I stand up from a sitting position. I noticed I began to feel this way sometimes this morning. I took my pressure a few minutes ago with my wrist cuff and it was 80/45, which is very low for me. I read that the Ditropan can cause a drop in blood pressure, but just wanted to confirm that that's the case? What should I do?   
well-groomed/well-developed

## 2023-02-09 ENCOUNTER — PATIENT MESSAGE (OUTPATIENT)
Dept: SURGERY | Facility: HOSPITAL | Age: 33
End: 2023-02-09
Payer: COMMERCIAL

## 2023-02-09 ENCOUNTER — TELEPHONE (OUTPATIENT)
Dept: UROLOGY | Facility: CLINIC | Age: 33
End: 2023-02-09
Payer: COMMERCIAL

## 2023-02-09 LAB — BACTERIA UR CULT: ABNORMAL

## 2023-02-09 RX ORDER — NITROFURANTOIN 25; 75 MG/1; MG/1
100 CAPSULE ORAL 2 TIMES DAILY
Qty: 14 CAPSULE | Refills: 0 | Status: SHIPPED | OUTPATIENT
Start: 2023-02-09 | End: 2023-03-06

## 2023-02-09 NOTE — TELEPHONE ENCOUNTER
Left message for patient on voicemail. Has UTI. Sending [facility] to [facility]. Needs to start on it tonight.

## 2023-02-09 NOTE — TELEPHONE ENCOUNTER
Called the patient to inform that per Dr. Zelaya medication was sent to the patient's pharmacy at [facility] for a UTI. Patient verbalized understanding.

## 2023-02-10 ENCOUNTER — TELEPHONE (OUTPATIENT)
Dept: UROLOGY | Facility: CLINIC | Age: 33
End: 2023-02-10
Payer: COMMERCIAL

## 2023-02-13 ENCOUNTER — ANESTHESIA EVENT (OUTPATIENT)
Dept: SURGERY | Facility: HOSPITAL | Age: 33
End: 2023-02-13
Payer: COMMERCIAL

## 2023-02-13 ENCOUNTER — ANESTHESIA (OUTPATIENT)
Dept: SURGERY | Facility: HOSPITAL | Age: 33
End: 2023-02-13
Payer: COMMERCIAL

## 2023-02-13 ENCOUNTER — HOSPITAL ENCOUNTER (OUTPATIENT)
Facility: HOSPITAL | Age: 33
Discharge: HOME | End: 2023-02-13
Attending: UROLOGY | Admitting: UROLOGY
Payer: COMMERCIAL

## 2023-02-13 ENCOUNTER — PATIENT OUTREACH (OUTPATIENT)
Dept: ADMINISTRATIVE | Facility: HOSPITAL | Age: 33
End: 2023-02-13
Payer: COMMERCIAL

## 2023-02-13 VITALS
WEIGHT: 174 LBS | DIASTOLIC BLOOD PRESSURE: 63 MMHG | TEMPERATURE: 98 F | BODY MASS INDEX: 32.85 KG/M2 | SYSTOLIC BLOOD PRESSURE: 97 MMHG | HEART RATE: 92 BPM | HEIGHT: 61 IN | OXYGEN SATURATION: 96 % | RESPIRATION RATE: 19 BRPM

## 2023-02-13 DIAGNOSIS — N20.1 URETERAL STONE: Primary | ICD-10-CM

## 2023-02-13 DIAGNOSIS — N20.1 LEFT URETERAL STONE: Primary | ICD-10-CM

## 2023-02-13 PROCEDURE — C1769 GUIDE WIRE: HCPCS | Performed by: UROLOGY

## 2023-02-13 PROCEDURE — 37000008 HC ANESTHESIA 1ST 15 MINUTES: Performed by: UROLOGY

## 2023-02-13 PROCEDURE — 36000707: Performed by: UROLOGY

## 2023-02-13 PROCEDURE — 52332 CYSTOSCOPY AND TREATMENT: CPT | Mod: 51,LT,, | Performed by: UROLOGY

## 2023-02-13 PROCEDURE — 63600175 PHARM REV CODE 636 W HCPCS: Performed by: NURSE ANESTHETIST, CERTIFIED REGISTERED

## 2023-02-13 PROCEDURE — D9220A PRA ANESTHESIA: ICD-10-PCS | Mod: ANES,,, | Performed by: ANESTHESIOLOGY

## 2023-02-13 PROCEDURE — 82365 CALCULUS SPECTROSCOPY: CPT | Performed by: UROLOGY

## 2023-02-13 PROCEDURE — 71000044 HC DOSC ROUTINE RECOVERY FIRST HOUR: Performed by: UROLOGY

## 2023-02-13 PROCEDURE — D9220A PRA ANESTHESIA: Mod: ANES,,, | Performed by: ANESTHESIOLOGY

## 2023-02-13 PROCEDURE — 25000003 PHARM REV CODE 250: Performed by: NURSE ANESTHETIST, CERTIFIED REGISTERED

## 2023-02-13 PROCEDURE — C2617 STENT, NON-COR, TEM W/O DEL: HCPCS | Performed by: UROLOGY

## 2023-02-13 PROCEDURE — 36000706: Performed by: UROLOGY

## 2023-02-13 PROCEDURE — 52352 PR CYSTO/URETERO/PYELOSCOPY, CALCULUS TX: ICD-10-PCS | Mod: LT,,, | Performed by: UROLOGY

## 2023-02-13 PROCEDURE — 52352 CYSTOURETERO W/STONE REMOVE: CPT | Mod: LT,,, | Performed by: UROLOGY

## 2023-02-13 PROCEDURE — 25000003 PHARM REV CODE 250: Performed by: STUDENT IN AN ORGANIZED HEALTH CARE EDUCATION/TRAINING PROGRAM

## 2023-02-13 PROCEDURE — 63600175 PHARM REV CODE 636 W HCPCS: Performed by: STUDENT IN AN ORGANIZED HEALTH CARE EDUCATION/TRAINING PROGRAM

## 2023-02-13 PROCEDURE — 71000015 HC POSTOP RECOV 1ST HR: Performed by: UROLOGY

## 2023-02-13 PROCEDURE — D9220A PRA ANESTHESIA: ICD-10-PCS | Mod: CRNA,,, | Performed by: NURSE ANESTHETIST, CERTIFIED REGISTERED

## 2023-02-13 PROCEDURE — D9220A PRA ANESTHESIA: Mod: CRNA,,, | Performed by: NURSE ANESTHETIST, CERTIFIED REGISTERED

## 2023-02-13 PROCEDURE — C1758 CATHETER, URETERAL: HCPCS | Performed by: UROLOGY

## 2023-02-13 PROCEDURE — 52332 PR CYSTOSCOPY,INSERT URETERAL STENT: ICD-10-PCS | Mod: 51,LT,, | Performed by: UROLOGY

## 2023-02-13 PROCEDURE — 37000009 HC ANESTHESIA EA ADD 15 MINS: Performed by: UROLOGY

## 2023-02-13 DEVICE — STENT URETERAL UNIV 6FR 22CM: Type: IMPLANTABLE DEVICE | Site: URETER | Status: FUNCTIONAL

## 2023-02-13 RX ORDER — DEXAMETHASONE SODIUM PHOSPHATE 4 MG/ML
INJECTION, SOLUTION INTRA-ARTICULAR; INTRALESIONAL; INTRAMUSCULAR; INTRAVENOUS; SOFT TISSUE
Status: DISCONTINUED | OUTPATIENT
Start: 2023-02-13 | End: 2023-02-13

## 2023-02-13 RX ORDER — OXYCODONE HYDROCHLORIDE 5 MG/1
5 TABLET ORAL
Status: DISCONTINUED | OUTPATIENT
Start: 2023-02-13 | End: 2023-02-13 | Stop reason: HOSPADM

## 2023-02-13 RX ORDER — SCOLOPAMINE TRANSDERMAL SYSTEM 1 MG/1
1 PATCH, EXTENDED RELEASE TRANSDERMAL
Status: DISCONTINUED | OUTPATIENT
Start: 2023-02-13 | End: 2023-02-13 | Stop reason: HOSPADM

## 2023-02-13 RX ORDER — TAMSULOSIN HYDROCHLORIDE 0.4 MG/1
0.4 CAPSULE ORAL DAILY
Qty: 7 CAPSULE | Refills: 0 | Status: SHIPPED | OUTPATIENT
Start: 2023-02-13 | End: 2023-03-06

## 2023-02-13 RX ORDER — OXYBUTYNIN CHLORIDE 5 MG/1
5 TABLET ORAL 3 TIMES DAILY
Qty: 21 TABLET | Refills: 0 | Status: SHIPPED | OUTPATIENT
Start: 2023-02-13 | End: 2023-02-13 | Stop reason: HOSPADM

## 2023-02-13 RX ORDER — FAMOTIDINE 10 MG/ML
INJECTION INTRAVENOUS
Status: DISCONTINUED | OUTPATIENT
Start: 2023-02-13 | End: 2023-02-13

## 2023-02-13 RX ORDER — OXYCODONE HYDROCHLORIDE 5 MG/1
5 TABLET ORAL EVERY 6 HOURS PRN
Qty: 6 TABLET | Refills: 0 | Status: SHIPPED | OUTPATIENT
Start: 2023-02-13 | End: 2023-02-13 | Stop reason: HOSPADM

## 2023-02-13 RX ORDER — TAMSULOSIN HYDROCHLORIDE 0.4 MG/1
0.4 CAPSULE ORAL DAILY
Qty: 7 CAPSULE | Refills: 0 | Status: SHIPPED | OUTPATIENT
Start: 2023-02-13 | End: 2023-02-13 | Stop reason: HOSPADM

## 2023-02-13 RX ORDER — OXYBUTYNIN CHLORIDE 5 MG/1
5 TABLET ORAL 3 TIMES DAILY
Qty: 90 TABLET | Refills: 11 | Status: SHIPPED | OUTPATIENT
Start: 2023-02-13 | End: 2023-03-06

## 2023-02-13 RX ORDER — LIDOCAINE HYDROCHLORIDE 20 MG/ML
INJECTION, SOLUTION EPIDURAL; INFILTRATION; INTRACAUDAL; PERINEURAL
Status: DISCONTINUED | OUTPATIENT
Start: 2023-02-13 | End: 2023-02-13

## 2023-02-13 RX ORDER — MIDAZOLAM HYDROCHLORIDE 1 MG/ML
INJECTION, SOLUTION INTRAMUSCULAR; INTRAVENOUS
Status: DISCONTINUED | OUTPATIENT
Start: 2023-02-13 | End: 2023-02-13

## 2023-02-13 RX ORDER — ONDANSETRON 2 MG/ML
INJECTION INTRAMUSCULAR; INTRAVENOUS
Status: DISCONTINUED | OUTPATIENT
Start: 2023-02-13 | End: 2023-02-13

## 2023-02-13 RX ORDER — ONDANSETRON 2 MG/ML
4 INJECTION INTRAMUSCULAR; INTRAVENOUS DAILY PRN
Status: DISCONTINUED | OUTPATIENT
Start: 2023-02-13 | End: 2023-02-13 | Stop reason: HOSPADM

## 2023-02-13 RX ORDER — ROCURONIUM BROMIDE 10 MG/ML
INJECTION, SOLUTION INTRAVENOUS
Status: DISCONTINUED | OUTPATIENT
Start: 2023-02-13 | End: 2023-02-13

## 2023-02-13 RX ORDER — GENTAMICIN SULFATE 100 MG/100ML
INJECTION, SOLUTION INTRAVENOUS
Status: DISCONTINUED | OUTPATIENT
Start: 2023-02-13 | End: 2023-02-13

## 2023-02-13 RX ORDER — PROCHLORPERAZINE EDISYLATE 5 MG/ML
5 INJECTION INTRAMUSCULAR; INTRAVENOUS EVERY 30 MIN PRN
Status: DISCONTINUED | OUTPATIENT
Start: 2023-02-13 | End: 2023-02-13 | Stop reason: HOSPADM

## 2023-02-13 RX ORDER — PROPOFOL 10 MG/ML
VIAL (ML) INTRAVENOUS
Status: DISCONTINUED | OUTPATIENT
Start: 2023-02-13 | End: 2023-02-13

## 2023-02-13 RX ORDER — KETOROLAC TROMETHAMINE 30 MG/ML
INJECTION, SOLUTION INTRAMUSCULAR; INTRAVENOUS
Status: DISCONTINUED | OUTPATIENT
Start: 2023-02-13 | End: 2023-02-13

## 2023-02-13 RX ORDER — OXYCODONE HYDROCHLORIDE 5 MG/1
5 TABLET ORAL EVERY 6 HOURS PRN
Qty: 10 TABLET | Refills: 0 | Status: SHIPPED | OUTPATIENT
Start: 2023-02-13 | End: 2023-03-06

## 2023-02-13 RX ORDER — FENTANYL CITRATE 50 UG/ML
INJECTION, SOLUTION INTRAMUSCULAR; INTRAVENOUS
Status: DISCONTINUED | OUTPATIENT
Start: 2023-02-13 | End: 2023-02-13

## 2023-02-13 RX ORDER — HYDROMORPHONE HYDROCHLORIDE 1 MG/ML
0.2 INJECTION, SOLUTION INTRAMUSCULAR; INTRAVENOUS; SUBCUTANEOUS EVERY 5 MIN PRN
Status: DISCONTINUED | OUTPATIENT
Start: 2023-02-13 | End: 2023-02-13 | Stop reason: HOSPADM

## 2023-02-13 RX ADMIN — LIDOCAINE HYDROCHLORIDE 50 MG: 20 INJECTION, SOLUTION EPIDURAL; INFILTRATION; INTRACAUDAL; PERINEURAL at 07:02

## 2023-02-13 RX ADMIN — SUGAMMADEX 400 MG: 100 INJECTION, SOLUTION INTRAVENOUS at 08:02

## 2023-02-13 RX ADMIN — PROPOFOL 200 MG: 10 INJECTION, EMULSION INTRAVENOUS at 07:02

## 2023-02-13 RX ADMIN — DEXAMETHASONE SODIUM PHOSPHATE 8 MG: 4 INJECTION, SOLUTION INTRAMUSCULAR; INTRAVENOUS at 07:02

## 2023-02-13 RX ADMIN — ROCURONIUM BROMIDE 40 MG: 10 INJECTION INTRAVENOUS at 07:02

## 2023-02-13 RX ADMIN — KETOROLAC TROMETHAMINE 30 MG: 30 INJECTION, SOLUTION INTRAMUSCULAR; INTRAVENOUS at 07:02

## 2023-02-13 RX ADMIN — FENTANYL CITRATE 100 MCG: 50 INJECTION, SOLUTION INTRAMUSCULAR; INTRAVENOUS at 07:02

## 2023-02-13 RX ADMIN — AMPICILLIN 1 G: 1 INJECTION, POWDER, FOR SOLUTION INTRAMUSCULAR; INTRAVENOUS at 07:02

## 2023-02-13 RX ADMIN — FAMOTIDINE 20 MG: 10 INJECTION, SOLUTION INTRAVENOUS at 07:02

## 2023-02-13 RX ADMIN — MIDAZOLAM HYDROCHLORIDE 2 MG: 1 INJECTION, SOLUTION INTRAMUSCULAR; INTRAVENOUS at 07:02

## 2023-02-13 RX ADMIN — ONDANSETRON 4 MG: 2 INJECTION INTRAMUSCULAR; INTRAVENOUS at 07:02

## 2023-02-13 RX ADMIN — GENTAMICIN SULFATE 160 MG: 1 INJECTION, SOLUTION INTRAVENOUS at 07:02

## 2023-02-13 NOTE — INTERVAL H&P NOTE
The patient has been examined and the H&P has been reviewed:    I concur with the findings and no changes have occurred since H&P was written.    Surgery risks, benefits and alternative options discussed and understood by patient/family.    Urine positive for nitrites. On Azo and macrobid. No fevers/chills    There are no hospital problems to display for this patient.

## 2023-02-13 NOTE — ANESTHESIA PREPROCEDURE EVALUATION
"                                                                                                             02/13/2023  Rosa Limon is a 33 y.o., female.    [facility]  Anesthesia Pre-Operative Evaluation       Patient Name: Rosa Limon  YOB: 1990  MRN: [id number]  [facility]: [id number]      Code Status: Prior   Date of Procedure: 2/13/2023  Anesthesia: General Procedure: Procedure(s) (LRB):  CYSTOURETEROSCOPY,WITH HOLMIUM LASER LITHOTRIPSY OF URETERAL CALCULUS (Left)  PLACEMENT-STENT (Left)  CYSTOSCOPY, WITH RETROGRADE PYELOGRAM (N/A)  Pre-Operative Diagnosis: Left ureteral stone [N20.1]  Proceduralist: Surgeon(s) and Role:     * Sharon Zelaya MD - Primary        SUBJECTIVE:   Rosa Limon is a 33 y.o. female who  has a past medical history of Abnormal Pap smear of cervix (09/14/2015), Arthritis, ASCUS with positive high risk HPV cervical (09/21/2017), Depression, Factor II deficiency, HPV in female, Insomnia, and Stroke. No notes on file    she has a current medication list which includes the following long-term medication(s): oxybutynin and tamsulosin.   ALLERGIES:     Review of patient's allergies indicates:   Allergen Reactions    Metoclopramide      Makes her irratated. "feels like she is jumping out of her skin" per patient.       History:   There are no hospital problems to display for this patient.    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 Factor II deficiency        Surgical History:    has a past surgical history that includes Fracture surgery; Tonsillectomy; ORIF wrist left (Left, 5/1/2015); Colposcopy (10/12/2017); Cervical biopsy w/ loop electrode excision (11/2017); Hysteroscopy (N/A, 2/26/2021); Cystoscopy (5/30/2022); Ureteroscopy (Right, 5/30/2022); Ureteroscopic removal of ureteric calculus (Right, 5/30/2022); Ureteral stent placement (Right, 5/30/2022); Cystoscopy (N/A, 1/30/2023); Ureteral stent placement (Left, 1/30/2023); and Ureteroscopy (Left, 1/30/2023).   Social History:    r reports that she has never smoked. She has never used smokeless tobacco. She reports that she does not currently use alcohol. She reports that she does not use drugs.     OBJECTIVE:     Vital Signs (Most Recent):    Vital Signs Range (Last 24H):          There is no height or weight on file to calculate BMI.   Wt Readings from Last 4 Encounters:   01/30/23 81.2 kg (179 lb 0.2 oz)   11/03/22 84 kg (185 lb 3 oz)   10/14/22 92.1 kg (203 lb)   09/28/22 92.5 kg (203 lb 14.8 oz)     Significant Labs:  Lab Results   Component Value Date    WBC 8.20 08/27/2022    HGB 14.4 08/27/2022    HCT 43.9 08/27/2022     08/27/2022     01/30/2023    K 4.0 01/30/2023     01/30/2023    CREATININE 0.7 01/30/2023    BUN 7 01/30/2023    CO2 19 (L) 01/30/2023    GLU 78 01/30/2023    CALCIUM 9.3 01/30/2023    MG 2.2 10/03/2017    PHOS 2.7 05/06/2015    ALKPHOS 86 08/27/2022    ALT 25 08/27/2022    AST 19 08/27/2022    ALBUMIN 3.7 08/27/2022    INR 0.8 10/03/2017    HGBA1C 5.1 08/27/2022     No LMP recorded. (Menstrual status: Birth Control).  No results found for this or any previous visit (from the past 72  hour(s)).    EKG:   Results for orders placed or performed in visit on 01/03/18   EKG 12-lead    Collection Time: 01/03/18 10:31 AM    Narrative    Test Reason : R00.0  Blood Pressure :  Vent. Rate : 095 BPM     Atrial Rate : 095 BPM     P-R Int : 150 ms          QRS Dur : 080 ms      QT Int : 352 ms       P-R-T Axes : 034 007 039 degrees     QTc Int : 442 ms    Normal sinus rhythm  Normal ECG  No previous ECGs available  Confirmed by MARJ BASHIR MD ([id number]) on 1/4/2018 9:59:35 AM    Referred By: GENE BREWER           Confirmed By:MARJ BASHIR MD       ASSESSMENT/PLAN:       Pre-op Assessment    I have reviewed the Patient Summary Reports.     I have reviewed the Nursing Notes. I have reviewed the NPO Status.   I have reviewed the Medications.     Review of Systems  Anesthesia Hx:  No problems with previous Anesthesia  Denies Family Hx of Anesthesia complications.   Denies Personal Hx of Anesthesia complications.   Social:  Former Smoker, No Alcohol Use    Hematology/Oncology:  Hematology Normal   Oncology Normal     EENT/Dental:   Wears glasses   Cardiovascular:  Cardiovascular Normal Exercise tolerance: good  Walking daily/Housework/Climbs stairs daily/Mows lawn   Pulmonary:  Pulmonary Normal    Renal/:   Chronic Renal Disease renal calculi Left ureteral stone   Hepatic/GI:  Hepatic/GI Normal    Musculoskeletal:  Musculoskeletal Normal    OB/GYN/PEDS:  None   Neurological:   CVA Headaches 2016   Endocrine:  Obesity / BMI > 30  Dermatological:  Skin Normal    Psych:   Psychiatric History anxiety depression          Physical Exam  General: Well nourished, Cooperative and Alert    Airway:  Mallampati: III / II  Mouth Opening: Normal  TM Distance: Normal  Tongue: Normal  Neck ROM: Normal ROM    Dental:  Intact    Chest/Lungs:  Normal Respiratory Rate    Heart:  Rate: Normal  Rhythm: Regular Rhythm    Sarai McclellanRN 1/20/23      Anesthesia Assessment: Preoperative EQUATION    Planned Procedure: Procedure(s)  (LRB):  CYSTOURETEROSCOPY,WITH HOLMIUM LASER LITHOTRIPSY OF URETERAL CALCULUS (Left)  PLACEMENT-STENT (Left)  CYSTOSCOPY, WITH RETROGRADE PYELOGRAM (N/A)  Requested Anesthesia Type:General  Surgeon: Sharon Zelaya MD  Service: Urology  Known or anticipated Date of Surgery:1/30/2023    Surgeon notes: reviewed and Left ureteral stone    Previous anesthesia records: 5/30/22-Cystoscopy Ureteroscopy-Right/Removal, Calculus, Ureter Ureteroscopic-Right/ Insertion, Stent, Ureter-Right-General -no apparent anesthetic complications and tolerated procedure well-no nausea/vomiting    Anesthesia Hx:  No problems with previous Anesthesia  History of prior surgery of interest to airway management or planning: Denies Family Hx of Anesthesia complications.   Denies Personal Hx of Anesthesia complications.     Airway:  Mallampati: II   Mouth Opening: Normal  TM Distance: Normal  Tongue: Normal  Neck ROM: Normal ROM    Last PCP note: 3-6 months ago , within [facility] , 8/19/22-Eileen John MD-IM-Health care maintenance+1 more Dx-Annual Exam  Subspecialty notes:  OP GYN visit    Other important co-morbidities:  Stroke/Arthritis & left ureteral stone     Medical History    Diagnosis Date Comment Source   Abnormal Pap smear of cervix 09/14/2015 (+) Low Grade Squamous Intraepithelial Lesion     Arthritis  left elbow    ASCUS with positive high risk HPV cervical 09/21/2017     Depression      Factor II deficiency  CVA on Nuvaring    HPV in female      Insomnia      Stroke  CVA on Nuvaring. Factor II deficiency       Tests already available:  Results have been reviewed.       Plan: Phone pending     Testing:  BMP        Patient  has previously scheduled Medical Appointment:Appointment on 1/23/23, prior to the surgery date.     Navigation: Phone Completed                        Tests Scheduled. BMP -(Pt. Out of town & will need to have lab -BMP done on DOS-(ordered-Sign & Held)             Consults scheduled.None needed at this  time.                 Sarai Mcclellan RN  1/20/23                 Anesthesia Plan  Type of Anesthesia, risks & benefits discussed:    Anesthesia Type: Gen ETT  Intra-op Monitoring Plan: Standard ASA Monitors  Post Op Pain Control Plan: IV/PO Opioids PRN  Induction:  IV  Airway Plan: Video, Post-Induction  Informed Consent: Informed consent signed with the Patient and all parties understand the risks and agree with anesthesia plan.  All questions answered.   ASA Score: 2  Day of Surgery Review of History & Physical: H&P Update referred to the surgeon/provider.    Ready For Surgery From Anesthesia Perspective.       .

## 2023-02-13 NOTE — BRIEF OP NOTE
[facility] - Surgery ([facility])  Brief Operative Note    Surgery Date: 2/13/2023     Surgeon(s) and Role:     * Sharon Zelaya MD - Primary     * Judah Moses MD - Resident - Assisting     * Woodrow Grimes MD - Resident - Assisting        Pre-op Diagnosis:  Left ureteral stone [N20.1]    Post-op Diagnosis:  Post-Op Diagnosis Codes:     * Left ureteral stone [N20.1]    Procedure(s) (LRB):  CYSTOSCOPY  REPLACEMENT, STENT (Left)  URETEROSCOPY (Left)  REMOVAL, CALCULUS, URETER, URETEROSCOPIC (Left)  REMOVAL, STENT, URETER    Anesthesia: General    Operative Findings:   -Left ureteral stone in the distal ureter successfully removed whole    Estimated Blood Loss: * No values recorded between 2/13/2023  7:48 AM and 2/13/2023  8:01 AM *         Specimens:   Specimen (24h ago, onward)      None              Discharge Note    OUTCOME: Patient tolerated treatment/procedure well without complication and is now ready for discharge.    DISPOSITION: Home or Self Care    FINAL DIAGNOSIS:  <principal problem not specified>    FOLLOWUP: In clinic    DISCHARGE INSTRUCTIONS:  No discharge procedures on file.

## 2023-02-13 NOTE — OP NOTE
[facility]  Operative Note    Date: 02/13/2023    Pre-Op Diagnosis: left ureteral stone    Patient Active Problem List    Diagnosis Date Noted    Left ureteral stone 02/13/2023    Nephrolithiasis 05/30/2022    Vitamin D insufficiency 10/17/2017    Prothrombin [id number] mutation 10/03/2017    Factor II deficiency     Cerebral venous sinus thrombosis 06/04/2015    Abnormal albumin 06/03/2015    Migraine aura, persistent, intractable, with status migrainosus 05/05/2015    Mild episode of recurrent major depressive disorder 05/05/2015    Encounter for therapeutic drug monitoring 05/05/2015    Dural sinus thrombosis 05/04/2015    Cephalalgia 05/04/2015    Headache 05/02/2015    Cerebral venous thrombosis 05/01/2015       Post-Op Diagnosis: same    Procedure(s) Performed:   1. Left ureteroscopy   2. Cystoscopy  3. Stone extraction  4. Fluoro < 1 hr    Specimen(s): stone for analysis    Staff Surgeon: Sharon Zelaya MD    Assistant Surgeon: MD Judah Gtz MD      Anesthesia: General LMA anesthesia    Indications: Rosa Limon is a 33 y.o. female with a left distal stone presenting for definitive stone management. She is pre-stented.    Findings:  1. Stone is not seen on  film.  2. Left ureteral stone in the distal ureter successfully removed whole    Estimated Blood Loss: min    Drains:   1. Left 6 Fr x 22 cm JJ ureteral stent without strings    Procedure in detail:  After risks, benefits, and possible complications were explained, the patient elected to undergo the procedure and informed consent was obtained. All questions were answered in the gerber-operative area. The patient was transferred to the cystoscopy suite and placed in the supine position. SCDs were applied and working. Anesthesia was administered. The patient was then placed in the dorsal lithotomy position and prepped and draped in the usual sterile fashion. Time out was performed, and gerber-procedural  antibiotics were confirmed.     The stone was not on  film. A rigid cystoscope in a 22 Fr sheath was introduced into the patient's urethra. This passed easily. The entire urethra was visualized which showed strictures or masses. Cystoscopy revealed the ureteral orifices in the normal anatomic location bilaterally.    A motion wire was placed through the left UO. The patient's ureteral stent was grasped with alligator graspers and brought to the meatus. This passed easily and placement was confirmed fluoroscopically. The cystoscope was removed keeping the wire in place.    An 8 Fr rigid ureteroscope was passed into the patient's bladder alongside the wire under direct vision. It was then passed through the left ureteral orifice alongside the wire. A stone was encountered at the level of the distal ureter. An NCircle basket was introduced through the ureteroscope. Stone was removed. The ureteroscope was reinserted and the entire length of the ureter was surveyed and no additional stone fragments. The ureter had minimal edema at the location of the stone      A 6 Fr x 26 cm JJ ureteral stent with strings was passed over the wire and up into the renal pelvis using fluoro. When the coil appeared to be in good position in the kidney and the radio-opaque marker of the pusher was at the inferior pubis, the wire was removed under continuous fluoro. Good coils were seen in the kidney and the bladder using fluoro.      The patient tolerated the procedure well and was transferred to the recovery room in stable condition.    Disposition:  The patient will be discharged home from PACU. She follow up with Dr. Zelaya  in 3 month with a renal ultrasound prior. She was given written instructions to self-remove her ureteral stent with strings on Wednesday, 2/15/2023. If she has significant stent discomfort she can remove her stent earlier.    Woodrow Grimes MD

## 2023-02-13 NOTE — PATIENT INSTRUCTIONS
Post Cystoscopy Instructions  Stent with strings to be removed Wednesday. If you have stent discomfort prior to pulling stent, it is okay to remove sooner.    Do not strain to have a bowel movement  No strenuous exercise x 7 days  No driving while you are on narcotic pain medications or if your arroyo  catheter is in place    You can expect:  To pass stone fragments if you had a stone procedure  Have pain when you void from your stent if you have a stent in place  See blood in your urine if you have a stent in place    If you have a catheter, please return to the ER if your catheter stops draining or you are having abdominal pain.    Call the doctor if:  Temperature is greater than 101F  Persistent vomiting and inability to keep food down  Inability to void if you do not have a catheter

## 2023-02-13 NOTE — ANESTHESIA PROCEDURE NOTES
Intubation    Date/Time: 2/13/2023 7:42 AM  Performed by: Dexter New CRNA  Authorized by: GIAN Mena MD     Intubation:     Induction:  Intravenous    Intubated:  Postinduction    Mask Ventilation:  Not attempted    Attempts:  1    Attempted By:  CRNA    Method of Intubation:  Video laryngoscopy    Blade:  Montalvo 3    Laryngeal View Grade: Grade I - full view of cords      Difficult Airway Encountered?: No      Complications:  None    Airway Device:  Oral endotracheal tube    Airway Device Size:  7.5    Style/Cuff Inflation:  Cuffed (inflated to minimal occlusive pressure)    Inflation Amount (mL):  8    Secured at:  The lips    Placement Verified By:  Capnometry    Complicating Factors:  None    Findings Post-Intubation:  BS equal bilateral

## 2023-02-13 NOTE — TRANSFER OF CARE
"Anesthesia Transfer of Care Note    Patient: Rosa Limon    Procedure(s) Performed: Procedure(s) (LRB):  CYSTOSCOPY  REPLACEMENT, STENT (Left)  URETEROSCOPY (Left)  REMOVAL, CALCULUS, URETER, URETEROSCOPIC (Left)  REMOVAL, STENT, URETER    Patient location: PACU    Anesthesia Type: general    Transport from OR: Transported from OR on room air with adequate spontaneous ventilation    Post pain: adequate analgesia    Post assessment: no apparent anesthetic complications    Post vital signs: stable    Level of consciousness: awake and alert    Nausea/Vomiting: no nausea/vomiting    Complications: none    Transfer of care protocol was followed      Last vitals:   Visit Vitals  BP (!) 92/54 (BP Location: Right arm, Patient Position: Lying)   Pulse 96   Temp 36.5 °C (97.7 °F) (Temporal)   Resp (!) 24   Ht 5' 1" (1.549 m)   Wt 78.9 kg (174 lb)   SpO2 95%   Breastfeeding No   BMI 32.88 kg/m²     "

## 2023-02-14 NOTE — ANESTHESIA POSTPROCEDURE EVALUATION
Anesthesia Post Evaluation    Patient: Rosa Limon    Procedure(s) Performed: Procedure(s) (LRB):  CYSTOSCOPY  REPLACEMENT, STENT (Left)  URETEROSCOPY (Left)  REMOVAL, CALCULUS, URETER, URETEROSCOPIC (Left)  REMOVAL, STENT, URETER    Final Anesthesia Type: general      Patient location during evaluation: PACU  Patient participation: Yes- Able to Participate  Level of consciousness: awake and alert  Post-procedure vital signs: reviewed and stable  Pain management: adequate  Airway patency: patent    PONV status at discharge: No PONV  Anesthetic complications: no      Cardiovascular status: blood pressure returned to baseline  Respiratory status: unassisted, spontaneous ventilation and room air  Hydration status: euvolemic            Vitals Value Taken Time   BP 97/63 02/13/23 0903   Temp 36.5 °C (97.7 °F) 02/13/23 0813   Pulse 92 02/13/23 0900   Resp 19 02/13/23 0900   SpO2 96 % 02/13/23 0900         No case tracking events are documented in the log.      Pain/Kari Score: Kari Score: 10 (2/13/2023  8:45 AM)

## 2023-02-17 LAB
COMPN STONE: NORMAL
SPECIMEN SOURCE: NORMAL
STONE ANALYSIS IR-IMP: NORMAL

## 2023-03-06 ENCOUNTER — OFFICE VISIT (OUTPATIENT)
Dept: OBSTETRICS AND GYNECOLOGY | Facility: CLINIC | Age: 33
End: 2023-03-06
Attending: OBSTETRICS & GYNECOLOGY
Payer: COMMERCIAL

## 2023-03-06 VITALS
BODY MASS INDEX: 32.46 KG/M2 | WEIGHT: 171.94 LBS | HEIGHT: 61 IN | DIASTOLIC BLOOD PRESSURE: 80 MMHG | SYSTOLIC BLOOD PRESSURE: 110 MMHG

## 2023-03-06 DIAGNOSIS — Z01.419 ENCOUNTER FOR GYNECOLOGICAL EXAMINATION (GENERAL) (ROUTINE) WITHOUT ABNORMAL FINDINGS: Primary | ICD-10-CM

## 2023-03-06 PROCEDURE — 99999 PR PBB SHADOW E&M-EST. PATIENT-LVL III: ICD-10-PCS | Mod: PBBFAC,,, | Performed by: OBSTETRICS & GYNECOLOGY

## 2023-03-06 PROCEDURE — 99999 PR PBB SHADOW E&M-EST. PATIENT-LVL III: CPT | Mod: PBBFAC,,, | Performed by: OBSTETRICS & GYNECOLOGY

## 2023-03-06 PROCEDURE — 99395 PREV VISIT EST AGE 18-39: CPT | Mod: S$GLB,,, | Performed by: OBSTETRICS & GYNECOLOGY

## 2023-03-06 PROCEDURE — 99395 PR PREVENTIVE VISIT,EST,18-39: ICD-10-PCS | Mod: S$GLB,,, | Performed by: OBSTETRICS & GYNECOLOGY

## 2023-03-06 RX ORDER — TIRZEPATIDE 7.5 MG/.5ML
INJECTION, SOLUTION SUBCUTANEOUS
COMMUNITY
Start: 2023-02-16 | End: 2024-02-20

## 2023-03-06 NOTE — PROGRESS NOTES
Subjective:       Patient ID: Rosa Limon is a 33 y.o. female.    Chief Complaint:  Annual Exam (Last pap 07- normal/Last hpv 07- negative/)        History of Present Illness  Rosa Limon is a 33 y.o. female  who presents for annual. No periods with Kalen. Overall doing well. Some trouble losing weight and hair on chin. Asking about hormone levels. Discussed in detail. Has had thyroid checked.     No LMP recorded. (Menstrual status: Birth Control).   Date of Last Pap: 2020    Review of Systems  Review of Systems   Constitutional:  Negative for chills and fever.      Objective:   Physical Exam:   Constitutional: She is oriented to person, place, and time. Vital signs are normal. She appears well-developed and well-nourished. No distress.        Pulmonary/Chest: She exhibits no mass. Right breast exhibits no mass, no nipple discharge, no skin change, no tenderness, no bleeding and no swelling. Left breast exhibits no mass, no nipple discharge, no skin change, no tenderness, no bleeding and no swelling. Breasts are symmetrical.        Abdominal: Soft. Bowel sounds are normal. She exhibits no distension and no mass. There is no abdominal tenderness. There is no rebound.     Genitourinary:    Vagina and uterus normal.   There is no rash, tenderness, lesion or injury on the right labia. There is no rash, tenderness, lesion or injury on the left labia. Cervix is normal. Right adnexum displays no mass, no tenderness and no fullness. Left adnexum displays no mass, no tenderness and no fullness. No erythema,  no vaginal discharge, tenderness, rectocele, cystocele or unspecified prolapse of vaginal walls in the vagina. Cervix exhibits no motion tenderness, no discharge and no friability. Uterus is not deviated, not enlarged, not fixed, not tender and not hosting fibroids. IUD strings visualized.          Musculoskeletal: Normal range of motion and moves all extremeties.       Lymphadenopathy:        Right: No supraclavicular adenopathy present.        Left: No supraclavicular adenopathy present.    Neurological: She is alert and oriented to person, place, and time.    Skin: Skin is warm and dry.    Psychiatric: She has a normal mood and affect. Her behavior is normal. Judgment normal.      Assessment/ Plan:     1. Encounter for gynecological examination (general) (routine) without abnormal findings            No follow-ups on file.    As of April 1, 2021, the Cures Act has been passed nationally. This new law requires that all doctors progress notes, lab results, pathology reports and radiology reports be released IMMEDIATELY to the patient in the patient portal. That means that the results are released to you at the EXACT same time they are released to me. Therefore, with all of the patients that I have I am not able to reply to each patient exactly when the results come in. So there will be a delay from when you see the results to when I see them and have time to come up with a response to send you. Also I only see these results when I am on the computer at work. So if the results come in over the weekend or after 5 pm of a work day, I will not see them until the next business day. As you can tell, this is a challenge as a physician to give every patient the quick response they hope for and deserve. So please be patient! Thanks for understanding, Dr. Nino

## 2023-03-07 RX ORDER — LEVONORGESTREL 52 MG/1
1 INTRAUTERINE DEVICE INTRAUTERINE ONCE
COMMUNITY

## 2023-04-03 ENCOUNTER — PATIENT MESSAGE (OUTPATIENT)
Dept: ADMINISTRATIVE | Facility: HOSPITAL | Age: 33
End: 2023-04-03
Payer: COMMERCIAL

## 2023-04-04 ENCOUNTER — PATIENT OUTREACH (OUTPATIENT)
Dept: ADMINISTRATIVE | Facility: HOSPITAL | Age: 33
End: 2023-04-04
Payer: COMMERCIAL

## 2023-04-04 NOTE — PROGRESS NOTES
Health Maintenance Due   Topic Date Due    Influenza Vaccine (1) 09/01/2022           HM updated. Triggered LINKS and Care Everywhere. Imported HPV results and edited modifier.    Gloria Davies LPN   Clinical Care Coordinator  Primary Care and Wellness

## 2023-05-16 ENCOUNTER — HOSPITAL ENCOUNTER (OUTPATIENT)
Dept: RADIOLOGY | Facility: HOSPITAL | Age: 33
Discharge: HOME | End: 2023-05-16
Attending: STUDENT IN AN ORGANIZED HEALTH CARE EDUCATION/TRAINING PROGRAM
Payer: COMMERCIAL

## 2023-05-16 DIAGNOSIS — N20.1 URETERAL STONE: ICD-10-CM

## 2023-05-16 PROCEDURE — 76770 US EXAM ABDO BACK WALL COMP: CPT | Mod: TC

## 2023-05-16 PROCEDURE — 76770 US EXAM ABDO BACK WALL COMP: CPT | Mod: 26,,, | Performed by: RADIOLOGY

## 2023-05-16 PROCEDURE — 76770 US KIDNEY: ICD-10-PCS | Mod: 26,,, | Performed by: RADIOLOGY

## 2023-06-13 ENCOUNTER — PATIENT MESSAGE (OUTPATIENT)
Dept: UROLOGY | Facility: CLINIC | Age: 33
End: 2023-06-13

## 2023-06-13 ENCOUNTER — OFFICE VISIT (OUTPATIENT)
Dept: UROLOGY | Facility: CLINIC | Age: 33
End: 2023-06-13
Payer: COMMERCIAL

## 2023-06-13 VITALS
BODY MASS INDEX: 32.46 KG/M2 | HEIGHT: 61 IN | DIASTOLIC BLOOD PRESSURE: 76 MMHG | HEART RATE: 80 BPM | WEIGHT: 171.94 LBS | SYSTOLIC BLOOD PRESSURE: 106 MMHG

## 2023-06-13 DIAGNOSIS — E55.9 VITAMIN D INSUFFICIENCY: ICD-10-CM

## 2023-06-13 DIAGNOSIS — N20.0 NEPHROLITHIASIS: Primary | ICD-10-CM

## 2023-06-13 DIAGNOSIS — R63.5 WEIGHT GAIN: ICD-10-CM

## 2023-06-13 PROBLEM — N20.1 LEFT URETERAL STONE: Status: RESOLVED | Noted: 2023-02-13 | Resolved: 2023-06-13

## 2023-06-13 PROCEDURE — 99213 OFFICE O/P EST LOW 20 MIN: CPT | Mod: S$GLB,,, | Performed by: UROLOGY

## 2023-06-13 PROCEDURE — 99213 PR OFFICE/OUTPT VISIT, EST, LEVL III, 20-29 MIN: ICD-10-PCS | Mod: S$GLB,,, | Performed by: UROLOGY

## 2023-06-13 PROCEDURE — 99999 PR PBB SHADOW E&M-EST. PATIENT-LVL IV: CPT | Mod: PBBFAC,,, | Performed by: UROLOGY

## 2023-06-13 PROCEDURE — 99999 PR PBB SHADOW E&M-EST. PATIENT-LVL IV: ICD-10-PCS | Mod: PBBFAC,,, | Performed by: UROLOGY

## 2023-06-13 NOTE — PROGRESS NOTES
"Urology - [facility]  Clinic Note    SUBJECTIVE:     Chief Complaint: kidney stones    History of Present Illness:  Rosa Limon is a 33 y.o. female who presents to clinic for kidney stones. She is established to our clinic to our clinic. Referral from Eileen John MD   S/p ureteroscopy on left in 2023 and right in 2022. No other stones on imagine.     Stone 2023  60% Calcium oxalate monohydrate   30% Calcium oxalate dihydrate   10% Calcium phosphate (apatite)   Stone 2022 100% ca oxalate    Low vit D - 2022 - 23. Not taking vit D supplement consistently.     2/2023   1. Left ureteroscopy   2. Cystoscopy  3. Stone extraction  4. Fluoro < 1 hr    5/16/23 ultrasound  FINDINGS:  Right kidney: The right kidney measures 11 cm. No cortical thinning. No loss of corticomedullary distinction.  Normal perfusion.  Resistive index measures 0.57.  No mass. No renal stone. No hydronephrosis.     Left kidney: The left kidney measures 11.5 cm. No cortical thinning. No loss of corticomedullary distinction.  Normal perfusion.  Resistive index measures 1.57.  No mass. No renal stone. Mild hydronephrosis.     Impression:     Mild left hydronephrosis  Anticoagulation:  No    OBJECTIVE:     Estimated body mass index is 32.49 kg/m² as calculated from the following:    Height as of this encounter: 5' 1" (1.549 m).    Weight as of this encounter: 78 kg (171 lb 15.3 oz).    Vital Signs (Most Recent)  Pulse: 80 (06/13/23 1039)  BP: 106/76 (06/13/23 1039)    Physical Exam  Vitals reviewed.   Pulmonary:      Effort: Pulmonary effort is normal.       Lab Results   Component Value Date    BUN 7 01/30/2023    CREATININE 0.7 01/30/2023    WBC 8.20 08/27/2022    HGB 14.4 08/27/2022    HCT 43.9 08/27/2022     08/27/2022    AST 19 08/27/2022    ALT 25 08/27/2022    ALKPHOS 86 08/27/2022    ALBUMIN 3.7 08/27/2022    HGBA1C 5.1 08/27/2022          Imaging:  none        ASSESSMENT     1. Nephrolithiasis    2. Vitamin D " insufficiency      PLAN:   Rosa was seen today for follow-up.    Diagnoses and all orders for this visit:    Nephrolithiasis  -     US Kidney; Future  -     PTH, Intact; Future  -     Basic Metabolic Panel; Future    Vitamin D insufficiency  -     US Kidney; Future  -     PTH, Intact; Future    Weight gain  -     TSH; Future      Ultrasound in 6 months.   Labs today.       Sharon Zelaya MD     Letter to Dora, Eileen ENRIQUEZ MD

## 2023-06-30 ENCOUNTER — PATIENT MESSAGE (OUTPATIENT)
Dept: UROLOGY | Facility: CLINIC | Age: 33
End: 2023-06-30
Payer: COMMERCIAL

## 2023-06-30 DIAGNOSIS — R63.5 WEIGHT GAIN: ICD-10-CM

## 2023-06-30 DIAGNOSIS — N20.0 NEPHROLITHIASIS: Primary | ICD-10-CM

## 2023-07-03 ENCOUNTER — LAB VISIT (OUTPATIENT)
Dept: LAB | Facility: HOSPITAL | Age: 33
End: 2023-07-03
Attending: UROLOGY
Payer: COMMERCIAL

## 2023-07-03 DIAGNOSIS — R63.5 WEIGHT GAIN: ICD-10-CM

## 2023-07-03 DIAGNOSIS — N20.0 NEPHROLITHIASIS: ICD-10-CM

## 2023-07-03 LAB
ANION GAP SERPL CALC-SCNC: 11 MMOL/L (ref 8–16)
BUN SERPL-MCNC: 8 MG/DL (ref 6–20)
CALCIUM SERPL-MCNC: 9 MG/DL (ref 8.7–10.5)
CHLORIDE SERPL-SCNC: 107 MMOL/L (ref 95–110)
CO2 SERPL-SCNC: 21 MMOL/L (ref 23–29)
CREAT SERPL-MCNC: 0.7 MG/DL (ref 0.5–1.4)
EST. GFR  (NO RACE VARIABLE): >60 ML/MIN/1.73 M^2
GLUCOSE SERPL-MCNC: 73 MG/DL (ref 70–110)
POTASSIUM SERPL-SCNC: 3.9 MMOL/L (ref 3.5–5.1)
PTH-INTACT SERPL-MCNC: 32 PG/ML (ref 9–77)
SODIUM SERPL-SCNC: 139 MMOL/L (ref 136–145)
TSH SERPL DL<=0.005 MIU/L-ACNC: 0.68 UIU/ML (ref 0.4–4)

## 2023-07-03 PROCEDURE — 83970 ASSAY OF PARATHORMONE: CPT | Performed by: UROLOGY

## 2023-07-03 PROCEDURE — 84443 ASSAY THYROID STIM HORMONE: CPT | Performed by: UROLOGY

## 2023-07-03 PROCEDURE — 80048 BASIC METABOLIC PNL TOTAL CA: CPT | Performed by: UROLOGY

## 2023-07-03 PROCEDURE — 36415 COLL VENOUS BLD VENIPUNCTURE: CPT | Mod: PO | Performed by: UROLOGY

## 2024-01-16 ENCOUNTER — PATIENT MESSAGE (OUTPATIENT)
Dept: OBSTETRICS AND GYNECOLOGY | Facility: CLINIC | Age: 34
End: 2024-01-16
Payer: COMMERCIAL

## 2024-01-16 DIAGNOSIS — Z80.3 FAMILY HISTORY OF BREAST CANCER IN MOTHER: ICD-10-CM

## 2024-01-16 DIAGNOSIS — Z12.31 ENCOUNTER FOR MAMMOGRAM TO ESTABLISH BASELINE MAMMOGRAM: Primary | ICD-10-CM

## 2024-01-20 ENCOUNTER — HOSPITAL ENCOUNTER (OUTPATIENT)
Dept: RADIOLOGY | Facility: HOSPITAL | Age: 34
Discharge: HOME | End: 2024-01-20
Attending: OBSTETRICS & GYNECOLOGY
Payer: COMMERCIAL

## 2024-01-20 VITALS — WEIGHT: 171 LBS | HEIGHT: 60 IN | BODY MASS INDEX: 33.57 KG/M2

## 2024-01-20 DIAGNOSIS — Z12.31 ENCOUNTER FOR MAMMOGRAM TO ESTABLISH BASELINE MAMMOGRAM: ICD-10-CM

## 2024-01-20 DIAGNOSIS — Z80.3 FAMILY HISTORY OF BREAST CANCER IN MOTHER: ICD-10-CM

## 2024-01-20 PROCEDURE — 77067 SCR MAMMO BI INCL CAD: CPT | Mod: TC

## 2024-01-20 PROCEDURE — 77067 SCR MAMMO BI INCL CAD: CPT | Mod: 26,,, | Performed by: RADIOLOGY

## 2024-01-20 PROCEDURE — 77063 BREAST TOMOSYNTHESIS BI: CPT | Mod: 26,,, | Performed by: RADIOLOGY

## 2024-02-20 ENCOUNTER — OFFICE VISIT (OUTPATIENT)
Dept: INTERNAL MEDICINE | Facility: CLINIC | Age: 34
End: 2024-02-20
Payer: COMMERCIAL

## 2024-02-20 ENCOUNTER — LAB VISIT (OUTPATIENT)
Dept: LAB | Facility: HOSPITAL | Age: 34
End: 2024-02-20
Payer: COMMERCIAL

## 2024-02-20 VITALS
HEIGHT: 60 IN | DIASTOLIC BLOOD PRESSURE: 68 MMHG | BODY MASS INDEX: 27.07 KG/M2 | OXYGEN SATURATION: 98 % | WEIGHT: 137.88 LBS | HEART RATE: 88 BPM | SYSTOLIC BLOOD PRESSURE: 106 MMHG

## 2024-02-20 DIAGNOSIS — Z00.00 HEALTH CARE MAINTENANCE: Primary | ICD-10-CM

## 2024-02-20 DIAGNOSIS — Z00.00 HEALTH CARE MAINTENANCE: ICD-10-CM

## 2024-02-20 LAB
25(OH)D3+25(OH)D2 SERPL-MCNC: 31 NG/ML (ref 30–96)
ALBUMIN SERPL BCP-MCNC: 3.9 G/DL (ref 3.5–5.2)
ALP SERPL-CCNC: 47 U/L (ref 55–135)
ALT SERPL W/O P-5'-P-CCNC: 10 U/L (ref 10–44)
ANION GAP SERPL CALC-SCNC: 8 MMOL/L (ref 8–16)
AST SERPL-CCNC: 13 U/L (ref 10–40)
BASOPHILS # BLD AUTO: 0.07 K/UL (ref 0–0.2)
BASOPHILS NFR BLD: 1.2 % (ref 0–1.9)
BILIRUB SERPL-MCNC: 0.8 MG/DL (ref 0.1–1)
BUN SERPL-MCNC: 6 MG/DL (ref 6–20)
CALCIUM SERPL-MCNC: 9.1 MG/DL (ref 8.7–10.5)
CHLORIDE SERPL-SCNC: 109 MMOL/L (ref 95–110)
CHOLEST SERPL-MCNC: 175 MG/DL (ref 120–199)
CHOLEST/HDLC SERPL: 3.9 {RATIO} (ref 2–5)
CO2 SERPL-SCNC: 25 MMOL/L (ref 23–29)
CREAT SERPL-MCNC: 0.8 MG/DL (ref 0.5–1.4)
DIFFERENTIAL METHOD BLD: NORMAL
EOSINOPHIL # BLD AUTO: 0.1 K/UL (ref 0–0.5)
EOSINOPHIL NFR BLD: 1.5 % (ref 0–8)
ERYTHROCYTE [DISTWIDTH] IN BLOOD BY AUTOMATED COUNT: 13.2 % (ref 11.5–14.5)
EST. GFR  (NO RACE VARIABLE): >60 ML/MIN/1.73 M^2
ESTIMATED AVG GLUCOSE: 82 MG/DL (ref 68–131)
GLUCOSE SERPL-MCNC: 83 MG/DL (ref 70–110)
HBA1C MFR BLD: 4.5 % (ref 4–5.6)
HCT VFR BLD AUTO: 40.8 % (ref 37–48.5)
HDLC SERPL-MCNC: 45 MG/DL (ref 40–75)
HDLC SERPL: 25.7 % (ref 20–50)
HGB BLD-MCNC: 13.3 G/DL (ref 12–16)
IMM GRANULOCYTES # BLD AUTO: 0.01 K/UL (ref 0–0.04)
IMM GRANULOCYTES NFR BLD AUTO: 0.2 % (ref 0–0.5)
LDLC SERPL CALC-MCNC: 109.8 MG/DL (ref 63–159)
LYMPHOCYTES # BLD AUTO: 2.1 K/UL (ref 1–4.8)
LYMPHOCYTES NFR BLD: 35.8 % (ref 18–48)
MCH RBC QN AUTO: 30 PG (ref 27–31)
MCHC RBC AUTO-ENTMCNC: 32.6 G/DL (ref 32–36)
MCV RBC AUTO: 92 FL (ref 82–98)
MONOCYTES # BLD AUTO: 0.4 K/UL (ref 0.3–1)
MONOCYTES NFR BLD: 6.6 % (ref 4–15)
NEUTROPHILS # BLD AUTO: 3.2 K/UL (ref 1.8–7.7)
NEUTROPHILS NFR BLD: 54.7 % (ref 38–73)
NONHDLC SERPL-MCNC: 130 MG/DL
NRBC BLD-RTO: 0 /100 WBC
PLATELET # BLD AUTO: 331 K/UL (ref 150–450)
PMV BLD AUTO: 9.7 FL (ref 9.2–12.9)
POTASSIUM SERPL-SCNC: 4.1 MMOL/L (ref 3.5–5.1)
PROT SERPL-MCNC: 6.5 G/DL (ref 6–8.4)
RBC # BLD AUTO: 4.43 M/UL (ref 4–5.4)
SODIUM SERPL-SCNC: 142 MMOL/L (ref 136–145)
TRIGL SERPL-MCNC: 101 MG/DL (ref 30–150)
TSH SERPL DL<=0.005 MIU/L-ACNC: 0.91 UIU/ML (ref 0.4–4)
WBC # BLD AUTO: 5.92 K/UL (ref 3.9–12.7)

## 2024-02-20 PROCEDURE — 99395 PREV VISIT EST AGE 18-39: CPT | Mod: S$GLB,,, | Performed by: PHYSICIAN ASSISTANT

## 2024-02-20 PROCEDURE — 80061 LIPID PANEL: CPT | Performed by: PHYSICIAN ASSISTANT

## 2024-02-20 PROCEDURE — 3078F DIAST BP <80 MM HG: CPT | Mod: CPTII,S$GLB,, | Performed by: PHYSICIAN ASSISTANT

## 2024-02-20 PROCEDURE — 84443 ASSAY THYROID STIM HORMONE: CPT | Performed by: PHYSICIAN ASSISTANT

## 2024-02-20 PROCEDURE — 3074F SYST BP LT 130 MM HG: CPT | Mod: CPTII,S$GLB,, | Performed by: PHYSICIAN ASSISTANT

## 2024-02-20 PROCEDURE — 99999 PR PBB SHADOW E&M-EST. PATIENT-LVL III: CPT | Mod: PBBFAC,,, | Performed by: PHYSICIAN ASSISTANT

## 2024-02-20 PROCEDURE — 3008F BODY MASS INDEX DOCD: CPT | Mod: CPTII,S$GLB,, | Performed by: PHYSICIAN ASSISTANT

## 2024-02-20 PROCEDURE — 83036 HEMOGLOBIN GLYCOSYLATED A1C: CPT | Performed by: PHYSICIAN ASSISTANT

## 2024-02-20 PROCEDURE — 1160F RVW MEDS BY RX/DR IN RCRD: CPT | Mod: CPTII,S$GLB,, | Performed by: PHYSICIAN ASSISTANT

## 2024-02-20 PROCEDURE — 36415 COLL VENOUS BLD VENIPUNCTURE: CPT | Performed by: PHYSICIAN ASSISTANT

## 2024-02-20 PROCEDURE — 3044F HG A1C LEVEL LT 7.0%: CPT | Mod: CPTII,S$GLB,, | Performed by: PHYSICIAN ASSISTANT

## 2024-02-20 PROCEDURE — 82306 VITAMIN D 25 HYDROXY: CPT | Performed by: PHYSICIAN ASSISTANT

## 2024-02-20 PROCEDURE — 85025 COMPLETE CBC W/AUTO DIFF WBC: CPT | Performed by: PHYSICIAN ASSISTANT

## 2024-02-20 PROCEDURE — 80053 COMPREHEN METABOLIC PANEL: CPT | Performed by: PHYSICIAN ASSISTANT

## 2024-02-20 PROCEDURE — 1159F MED LIST DOCD IN RCRD: CPT | Mod: CPTII,S$GLB,, | Performed by: PHYSICIAN ASSISTANT

## 2024-02-20 RX ORDER — GABAPENTIN 300 MG/1
300 CAPSULE ORAL NIGHTLY
COMMUNITY

## 2024-02-20 RX ORDER — SERTRALINE HYDROCHLORIDE 100 MG/1
100 TABLET, FILM COATED ORAL 2 TIMES DAILY
COMMUNITY
Start: 2024-02-19

## 2024-02-20 RX ORDER — TIRZEPATIDE 10 MG/.5ML
10 INJECTION, SOLUTION SUBCUTANEOUS
COMMUNITY

## 2024-02-20 NOTE — PROGRESS NOTES
Subjective:       Patient ID: Rosa Limon is a 34 y.o. female.        Chief Complaint: Annual Exam    Rosa Limon is an established patient of Eileen John MD here today for annual exam.    Starting weight was around 205#, now down to 137# with mounjaro.    She gets it online and reconstitutes it herself.  Risks of using unregulated medication discussed, she is aware.    Wt Readings from Last 4 Encounters:  02/20/24 : 62.5 kg (137 lb 14.4 oz)  01/20/24 : 77.6 kg (171 lb)  06/13/23 : 78 kg (171 lb 15.3 oz)  03/06/23 : 78 kg (171 lb 15.3 oz)    She got constipated once and strained to have a bowel movement and saw a small amount of bright red blood on the toilet paper.  No recurrence.  No black tarry stool.  No further constipation.    Anxiety, depression -  Zoloft 200 mg daily  Gabapentin 300 mg nightly    H/o kidney stones    H/o sagittal sinus thrombosis and prothrombin mutation           Review of Systems   Constitutional:  Negative for activity change, chills, diaphoresis, fatigue, fever and unexpected weight change.   HENT:  Negative for congestion, hearing loss, rhinorrhea, sore throat and trouble swallowing.    Eyes:  Negative for discharge and visual disturbance.   Respiratory:  Negative for cough, chest tightness, shortness of breath and wheezing.    Cardiovascular:  Negative for chest pain, palpitations and leg swelling.   Gastrointestinal:  Positive for blood in stool and constipation. Negative for abdominal pain, diarrhea, nausea and vomiting.   Endocrine: Negative for polydipsia and polyuria.   Genitourinary:  Negative for difficulty urinating, dysuria, frequency, hematuria, menstrual problem and urgency.   Musculoskeletal:  Negative for arthralgias, back pain, joint swelling and neck pain.   Skin:  Negative for rash.   Neurological:  Negative for dizziness, syncope, weakness and headaches.   Psychiatric/Behavioral:  Negative for confusion, dysphoric mood and sleep  "disturbance. The patient is not nervous/anxious.        Objective:      Physical Exam  Vitals and nursing note reviewed.   Constitutional:       Appearance: Normal appearance. She is well-developed.   HENT:      Head: Normocephalic.      Right Ear: Tympanic membrane and external ear normal.      Left Ear: Tympanic membrane and external ear normal.      Nose: No mucosal edema or rhinorrhea.      Mouth/Throat:      Pharynx: Oropharynx is clear.   Eyes:      Pupils: Pupils are equal, round, and reactive to light.   Cardiovascular:      Rate and Rhythm: Normal rate and regular rhythm.      Heart sounds: Normal heart sounds. No murmur heard.     No friction rub. No gallop.   Pulmonary:      Effort: Pulmonary effort is normal. No respiratory distress.      Breath sounds: Normal breath sounds.   Abdominal:      Palpations: Abdomen is soft.      Tenderness: There is no abdominal tenderness.   Skin:     General: Skin is warm and dry.   Neurological:      Mental Status: She is alert.         Assessment:       1. Health care maintenance        Plan:       Rosa was seen today for annual exam.    Diagnoses and all orders for this visit:    Health care maintenance  -     CBC Auto Differential; Future  -     Comprehensive Metabolic Panel; Future  -     Hemoglobin A1C; Future  -     Lipid Panel; Future  -     TSH; Future  -     Vitamin D; Future    Check fasting lab work today  She will schedule annual with Dr. Nino  She gets yearly eye exams    Pt has been given instructions populated from patient instructions database and has verbalized understanding of the after visit summary and information contained wherein.    Follow up with a primary care provider. May go to ER for acute shortness of breath, lightheadedness, fever, or any other emergent complaints or changes in condition.    "This note will be shared with the patient"    No future appointments.              "

## 2024-03-18 ENCOUNTER — PATIENT MESSAGE (OUTPATIENT)
Dept: ADMINISTRATIVE | Facility: HOSPITAL | Age: 34
End: 2024-03-18
Payer: COMMERCIAL

## 2024-03-18 ENCOUNTER — PATIENT OUTREACH (OUTPATIENT)
Dept: ADMINISTRATIVE | Facility: HOSPITAL | Age: 34
End: 2024-03-18
Payer: COMMERCIAL

## 2024-03-18 NOTE — PROGRESS NOTES
Health Maintenance Due   Topic Date Due    Cervical Cancer Screening  07/15/2023       HM updated. Scheduled GYN visit.  Gloria Davies LPN   Clinical Care Coordinator  [facility]

## 2024-06-20 ENCOUNTER — TELEPHONE (OUTPATIENT)
Dept: OBSTETRICS AND GYNECOLOGY | Facility: CLINIC | Age: 34
End: 2024-06-20
Payer: COMMERCIAL

## 2024-09-30 ENCOUNTER — OFFICE VISIT (OUTPATIENT)
Dept: OBSTETRICS AND GYNECOLOGY | Facility: CLINIC | Age: 34
End: 2024-09-30
Payer: COMMERCIAL

## 2024-09-30 ENCOUNTER — LAB VISIT (OUTPATIENT)
Dept: LAB | Facility: HOSPITAL | Age: 34
End: 2024-09-30
Attending: OBSTETRICS & GYNECOLOGY
Payer: COMMERCIAL

## 2024-09-30 VITALS
SYSTOLIC BLOOD PRESSURE: 110 MMHG | BODY MASS INDEX: 26.05 KG/M2 | HEIGHT: 60 IN | DIASTOLIC BLOOD PRESSURE: 64 MMHG | WEIGHT: 132.69 LBS

## 2024-09-30 DIAGNOSIS — Z11.51 SCREENING FOR HPV (HUMAN PAPILLOMAVIRUS): ICD-10-CM

## 2024-09-30 DIAGNOSIS — Z11.3 SCREENING EXAMINATION FOR STD (SEXUALLY TRANSMITTED DISEASE): ICD-10-CM

## 2024-09-30 DIAGNOSIS — Z12.4 PAP SMEAR FOR CERVICAL CANCER SCREENING: Primary | ICD-10-CM

## 2024-09-30 LAB
HBV SURFACE AG SERPL QL IA: NORMAL
HIV 1+2 AB+HIV1 P24 AG SERPL QL IA: NORMAL
TREPONEMA PALLIDUM IGG+IGM AB [PRESENCE] IN SERUM OR PLASMA BY IMMUNOASSAY: NONREACTIVE

## 2024-09-30 PROCEDURE — 99999 PR PBB SHADOW E&M-EST. PATIENT-LVL III: CPT | Mod: PBBFAC,,, | Performed by: OBSTETRICS & GYNECOLOGY

## 2024-09-30 PROCEDURE — 87340 HEPATITIS B SURFACE AG IA: CPT | Performed by: OBSTETRICS & GYNECOLOGY

## 2024-09-30 PROCEDURE — 87491 CHLMYD TRACH DNA AMP PROBE: CPT | Performed by: OBSTETRICS & GYNECOLOGY

## 2024-09-30 PROCEDURE — 3008F BODY MASS INDEX DOCD: CPT | Mod: CPTII,S$GLB,, | Performed by: OBSTETRICS & GYNECOLOGY

## 2024-09-30 PROCEDURE — 3044F HG A1C LEVEL LT 7.0%: CPT | Mod: CPTII,S$GLB,, | Performed by: OBSTETRICS & GYNECOLOGY

## 2024-09-30 PROCEDURE — 87389 HIV-1 AG W/HIV-1&-2 AB AG IA: CPT | Performed by: OBSTETRICS & GYNECOLOGY

## 2024-09-30 PROCEDURE — 99395 PREV VISIT EST AGE 18-39: CPT | Mod: S$GLB,,, | Performed by: OBSTETRICS & GYNECOLOGY

## 2024-09-30 PROCEDURE — 87624 HPV HI-RISK TYP POOLED RSLT: CPT | Performed by: OBSTETRICS & GYNECOLOGY

## 2024-09-30 PROCEDURE — 3074F SYST BP LT 130 MM HG: CPT | Mod: CPTII,S$GLB,, | Performed by: OBSTETRICS & GYNECOLOGY

## 2024-09-30 PROCEDURE — 36415 COLL VENOUS BLD VENIPUNCTURE: CPT | Performed by: OBSTETRICS & GYNECOLOGY

## 2024-09-30 PROCEDURE — 1159F MED LIST DOCD IN RCRD: CPT | Mod: CPTII,S$GLB,, | Performed by: OBSTETRICS & GYNECOLOGY

## 2024-09-30 PROCEDURE — 86593 SYPHILIS TEST NON-TREP QUANT: CPT | Performed by: OBSTETRICS & GYNECOLOGY

## 2024-09-30 PROCEDURE — 3078F DIAST BP <80 MM HG: CPT | Mod: CPTII,S$GLB,, | Performed by: OBSTETRICS & GYNECOLOGY

## 2024-09-30 PROCEDURE — 87591 N.GONORRHOEAE DNA AMP PROB: CPT | Performed by: OBSTETRICS & GYNECOLOGY

## 2024-09-30 NOTE — PROGRESS NOTES
Subjective:       Patient ID: Rosa Limon is a 34 y.o. female.    Chief Complaint:  Annual Exam ( Pap/HPV Normal/ Mammo 1)        History of Present Illness  Rosa Limon is a 34 y.o. female  who presents for annual. No gyn complaints. Doing well with Liletta IUD. Lost 80# and is feeling good. All questions answered. Had normal MMG this year    No LMP recorded. (Menstrual status: Birth Control).   Date of Last Pap: 2024    Review of Systems  Review of Systems   Constitutional:  Negative for chills and fever.        Objective:   Physical Exam:   Constitutional: She is oriented to person, place, and time. Vital signs are normal. She appears well-developed and well-nourished. No distress.        Pulmonary/Chest: She exhibits no mass. Right breast exhibits no mass, no nipple discharge, no skin change, no tenderness, no bleeding and no swelling. Left breast exhibits no mass, no nipple discharge, no skin change, no tenderness, no bleeding and no swelling. Breasts are symmetrical.        Abdominal: Soft. Bowel sounds are normal. She exhibits no distension and no mass. There is no abdominal tenderness. There is no rebound.     Genitourinary:    Vagina and uterus normal.   There is no rash, tenderness, lesion or injury on the right labia. There is no rash, tenderness, lesion or injury on the left labia. Cervix is normal. Right adnexum displays no mass, no tenderness and no fullness. Left adnexum displays no mass, no tenderness and no fullness. No erythema, vaginal discharge, tenderness, rectocele, cystocele or prolapse of vaginal walls in the vagina. Cervix exhibits no motion tenderness, no discharge and no friability. Uterus is not deviated, not enlarged, not fixed, not tender and not hosting fibroids. IUD strings visualized.          Musculoskeletal: Normal range of motion and moves all extremeties.      Lymphadenopathy:        Right: No supraclavicular adenopathy present.         Left: No supraclavicular adenopathy present.    Neurological: She is alert and oriented to person, place, and time.    Skin: Skin is warm and dry.    Psychiatric: She has a normal mood and affect. Her behavior is normal. Judgment normal.        Assessment/ Plan:     1. Pap smear for cervical cancer screening  Liquid-Based Pap Smear, Screening      2. Screening for HPV (human papillomavirus)  HPV High Risk Genotypes, PCR      3. Screening examination for STD (sexually transmitted disease)  HIV 1/2 Ag/Ab (4th Gen)    Hepatitis B Surface Antigen    C. trachomatis/N. gonorrhoeae by AMP DNA    Treponema Pallidium Antibodies IgG, IgM          No follow-ups on file.    As of April 1, 2021, the Cures Act has been passed nationally. This new law requires that all doctors progress notes, lab results, pathology reports and radiology reports be released IMMEDIATELY to the patient in the patient portal. That means that the results are released to you at the EXACT same time they are released to me. Therefore, with all of the patients that I have I am not able to reply to each patient exactly when the results come in. So there will be a delay from when you see the results to when I see them and have time to come up with a response to send you. Also I only see these results when I am on the computer at work. So if the results come in over the weekend or after 5 pm of a work day, I will not see them until the next business day. As you can tell, this is a challenge as a physician to give every patient the quick response they hope for and deserve. So please be patient! Thanks for understanding, Dr. Nino

## 2024-10-02 LAB
C TRACH DNA SPEC QL NAA+PROBE: NOT DETECTED
N GONORRHOEA DNA SPEC QL NAA+PROBE: NOT DETECTED

## 2024-12-02 ENCOUNTER — TELEPHONE (OUTPATIENT)
Dept: INTERNAL MEDICINE | Facility: CLINIC | Age: 34
End: 2024-12-02
Payer: COMMERCIAL

## 2025-01-29 ENCOUNTER — OFFICE VISIT (OUTPATIENT)
Dept: OBSTETRICS AND GYNECOLOGY | Facility: CLINIC | Age: 35
End: 2025-01-29
Payer: COMMERCIAL

## 2025-01-29 ENCOUNTER — PATIENT MESSAGE (OUTPATIENT)
Dept: OBSTETRICS AND GYNECOLOGY | Facility: CLINIC | Age: 35
End: 2025-01-29

## 2025-01-29 VITALS
HEIGHT: 60 IN | SYSTOLIC BLOOD PRESSURE: 114 MMHG | DIASTOLIC BLOOD PRESSURE: 78 MMHG | WEIGHT: 134.5 LBS | BODY MASS INDEX: 26.41 KG/M2

## 2025-01-29 DIAGNOSIS — Z30.2 ENCOUNTER FOR STERILIZATION: Primary | ICD-10-CM

## 2025-01-29 DIAGNOSIS — Z30.09 STERILIZATION CONSULT: Primary | ICD-10-CM

## 2025-01-29 PROCEDURE — 3074F SYST BP LT 130 MM HG: CPT | Mod: CPTII,S$GLB,, | Performed by: STUDENT IN AN ORGANIZED HEALTH CARE EDUCATION/TRAINING PROGRAM

## 2025-01-29 PROCEDURE — 3078F DIAST BP <80 MM HG: CPT | Mod: CPTII,S$GLB,, | Performed by: STUDENT IN AN ORGANIZED HEALTH CARE EDUCATION/TRAINING PROGRAM

## 2025-01-29 PROCEDURE — 1160F RVW MEDS BY RX/DR IN RCRD: CPT | Mod: CPTII,S$GLB,, | Performed by: STUDENT IN AN ORGANIZED HEALTH CARE EDUCATION/TRAINING PROGRAM

## 2025-01-29 PROCEDURE — 99212 OFFICE O/P EST SF 10 MIN: CPT | Mod: S$GLB,,, | Performed by: STUDENT IN AN ORGANIZED HEALTH CARE EDUCATION/TRAINING PROGRAM

## 2025-01-29 PROCEDURE — 99999 PR PBB SHADOW E&M-EST. PATIENT-LVL III: CPT | Mod: PBBFAC,,, | Performed by: STUDENT IN AN ORGANIZED HEALTH CARE EDUCATION/TRAINING PROGRAM

## 2025-01-29 PROCEDURE — 1159F MED LIST DOCD IN RCRD: CPT | Mod: CPTII,S$GLB,, | Performed by: STUDENT IN AN ORGANIZED HEALTH CARE EDUCATION/TRAINING PROGRAM

## 2025-01-29 PROCEDURE — 3008F BODY MASS INDEX DOCD: CPT | Mod: CPTII,S$GLB,, | Performed by: STUDENT IN AN ORGANIZED HEALTH CARE EDUCATION/TRAINING PROGRAM

## 2025-01-29 NOTE — PROGRESS NOTES
Chief Complaint: Sterilization consult     HPI:      Rosa Limon is a 35 y.o.  who presents today for sterilization consult.  Pt currently has Liletta IUD for contraception. She is positive she never desires pregnancy and would like to discuss permanent sterilization  She is not in a relationship. She has a history of stroke while on OCP and was told factor 2 mutation in 2016, which is another reason she does not want to get pregnant.  She has no new concerns at this time.    Physical Exam:      PHYSICAL EXAM:  /78 (BP Location: Left arm, Patient Position: Sitting)   Ht 5' (1.524 m)   Wt 61 kg (134 lb 7.7 oz)   LMP  (LMP Unknown)   BMI 26.26 kg/m²   Body mass index is 26.26 kg/m².     APPEARANCE: Well nourished, well developed, in no acute distress.    Assessment/Plan:     Sterilization consult    - discussed laparoscopic bilateral salpingectomy procedure vs continuing IUDs - pt very much desires permanent sterilization with salpingectomy - no additional questions at this time   - will plan to schedule this in May  - rtc for pre-op once scheduled

## 2025-01-30 NOTE — TELEPHONE ENCOUNTER
Requested Date:  5/15/25    Requested Time:  0700    Case Length:60 minutes    Surgeon: Gricelda Dover      Assistant Surgeon: Bone   Visit Type: Outpatient    LOCATION: [facility]    PROCEDURE: laparoscopic bilateral salpingectomy  Diagnosis: desires sterilization  Anesthesia type: General   Comments/Special Equipment Needed:  Rep needed: Yes  Does the patient need a PreOp appointment with MD: Yes  U/S needed at pre-op: No  PCP clearance: Not Needed  When does she need her Post op appointment: 2 weeks in person  Do you need additional time blocked out from clinic? Yes - please block AM for surgery (in case I add other cases this AM in Bone's block)  If so, what time do you want to be back in clinic? 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

## 2025-02-06 NOTE — TELEPHONE ENCOUNTER
Messaged  to move case to 4/10.  What time for this case?  Looks like Dr. Wild has a case at 7:00.  Do you need to open up 5/15 for clinic?  And block 4/10 AM and r/s those morning pts?

## 2025-02-20 ENCOUNTER — PATIENT MESSAGE (OUTPATIENT)
Dept: OBSTETRICS AND GYNECOLOGY | Facility: CLINIC | Age: 35
End: 2025-02-20
Payer: COMMERCIAL

## 2025-02-20 DIAGNOSIS — Z12.31 BREAST CANCER SCREENING BY MAMMOGRAM: Primary | ICD-10-CM

## 2025-03-08 ENCOUNTER — HOSPITAL ENCOUNTER (OUTPATIENT)
Dept: RADIOLOGY | Facility: HOSPITAL | Age: 35
Discharge: HOME | End: 2025-03-08
Attending: OBSTETRICS & GYNECOLOGY
Payer: COMMERCIAL

## 2025-03-08 DIAGNOSIS — Z12.31 BREAST CANCER SCREENING BY MAMMOGRAM: ICD-10-CM

## 2025-03-08 PROCEDURE — 77067 SCR MAMMO BI INCL CAD: CPT | Mod: 26,,, | Performed by: RADIOLOGY

## 2025-03-08 PROCEDURE — 77067 SCR MAMMO BI INCL CAD: CPT | Mod: TC

## 2025-03-08 PROCEDURE — 77063 BREAST TOMOSYNTHESIS BI: CPT | Mod: 26,,, | Performed by: RADIOLOGY

## 2025-03-10 ENCOUNTER — RESULTS FOLLOW-UP (OUTPATIENT)
Dept: OBSTETRICS AND GYNECOLOGY | Facility: CLINIC | Age: 35
End: 2025-03-10

## 2025-03-24 ENCOUNTER — OFFICE VISIT (OUTPATIENT)
Dept: INTERNAL MEDICINE | Facility: CLINIC | Age: 35
End: 2025-03-24
Payer: COMMERCIAL

## 2025-03-24 VITALS
HEART RATE: 78 BPM | BODY MASS INDEX: 26.12 KG/M2 | HEIGHT: 60 IN | WEIGHT: 133.06 LBS | DIASTOLIC BLOOD PRESSURE: 60 MMHG | SYSTOLIC BLOOD PRESSURE: 98 MMHG

## 2025-03-24 DIAGNOSIS — N20.0 NEPHROLITHIASIS: ICD-10-CM

## 2025-03-24 DIAGNOSIS — Z23 NEED FOR VACCINATION: ICD-10-CM

## 2025-03-24 DIAGNOSIS — D68.52 PROTHROMBIN G20210A MUTATION: Chronic | ICD-10-CM

## 2025-03-24 DIAGNOSIS — F33.0 MILD EPISODE OF RECURRENT MAJOR DEPRESSIVE DISORDER: Chronic | ICD-10-CM

## 2025-03-24 DIAGNOSIS — E55.9 VITAMIN D INSUFFICIENCY: ICD-10-CM

## 2025-03-24 DIAGNOSIS — Z00.00 HEALTH CARE MAINTENANCE: Primary | ICD-10-CM

## 2025-03-24 PROCEDURE — 3074F SYST BP LT 130 MM HG: CPT | Mod: CPTII,S$GLB,, | Performed by: PHYSICIAN ASSISTANT

## 2025-03-24 PROCEDURE — 3008F BODY MASS INDEX DOCD: CPT | Mod: CPTII,S$GLB,, | Performed by: PHYSICIAN ASSISTANT

## 2025-03-24 PROCEDURE — 90656 IIV3 VACC NO PRSV 0.5 ML IM: CPT | Mod: S$GLB,,, | Performed by: PHYSICIAN ASSISTANT

## 2025-03-24 PROCEDURE — 3078F DIAST BP <80 MM HG: CPT | Mod: CPTII,S$GLB,, | Performed by: PHYSICIAN ASSISTANT

## 2025-03-24 PROCEDURE — 99999 PR PBB SHADOW E&M-EST. PATIENT-LVL III: CPT | Mod: PBBFAC,,, | Performed by: PHYSICIAN ASSISTANT

## 2025-03-24 PROCEDURE — 1160F RVW MEDS BY RX/DR IN RCRD: CPT | Mod: CPTII,S$GLB,, | Performed by: PHYSICIAN ASSISTANT

## 2025-03-24 PROCEDURE — 99395 PREV VISIT EST AGE 18-39: CPT | Mod: 25,S$GLB,, | Performed by: PHYSICIAN ASSISTANT

## 2025-03-24 PROCEDURE — 90471 IMMUNIZATION ADMIN: CPT | Mod: S$GLB,,, | Performed by: PHYSICIAN ASSISTANT

## 2025-03-24 PROCEDURE — 1159F MED LIST DOCD IN RCRD: CPT | Mod: CPTII,S$GLB,, | Performed by: PHYSICIAN ASSISTANT

## 2025-03-24 RX ORDER — BUSPIRONE HYDROCHLORIDE 15 MG/1
15 TABLET ORAL 3 TIMES DAILY
COMMUNITY
Start: 2025-03-01

## 2025-03-24 NOTE — PROGRESS NOTES
Subjective:       Patient ID: Rosa Limon is a 35 y.o. female.        Chief Complaint: Annual Exam    Rosa Limon is an established patient of Eileen John MD here today for annual exam.     Starting weight was around 205#, now down to 137# with mounjaro but no longer taking.  Recall she was getting it online and reconstituting.       BMI 25     Anxiety, depression -  Zoloft 200 mg daily  Gabapentin 300 mg nightly prn sleep  Buspar 15 mg TID     H/o kidney stones     H/o sagittal sinus thrombosis 2015 due to prothrombin mutation and birth control, tx with anticoagulation from [id number], complete per hematology    Will be getting tubes tied upcoming, does not desire fertility             Review of Systems   Constitutional:  Negative for chills, diaphoresis, fatigue and fever.   HENT:  Negative for congestion and sore throat.    Eyes:  Negative for visual disturbance.   Respiratory:  Negative for cough, chest tightness and shortness of breath.    Cardiovascular:  Negative for chest pain, palpitations and leg swelling.   Gastrointestinal:  Negative for abdominal pain, blood in stool, constipation, diarrhea, nausea and vomiting.   Genitourinary:  Negative for dysuria, frequency, hematuria and urgency.   Musculoskeletal:  Negative for arthralgias and back pain.   Skin:  Negative for rash.   Neurological:  Negative for dizziness, syncope, weakness and headaches.   Psychiatric/Behavioral:  Negative for dysphoric mood and sleep disturbance. The patient is not nervous/anxious.        Objective:      Physical Exam  Vitals and nursing note reviewed.   Constitutional:       Appearance: Normal appearance. She is well-developed.   HENT:      Head: Normocephalic.      Right Ear: Tympanic membrane and external ear normal.      Left Ear: Tympanic membrane and external ear normal.      Nose: No mucosal edema or rhinorrhea.      Mouth/Throat:      Pharynx: Oropharynx is clear.   Eyes:      Pupils: Pupils  "are equal, round, and reactive to light.   Cardiovascular:      Rate and Rhythm: Normal rate and regular rhythm.      Heart sounds: Normal heart sounds. No murmur heard.     No friction rub. No gallop.   Pulmonary:      Effort: Pulmonary effort is normal. No respiratory distress.      Breath sounds: Normal breath sounds.   Abdominal:      Palpations: Abdomen is soft.      Tenderness: There is no abdominal tenderness.   Skin:     General: Skin is warm and dry.   Neurological:      Mental Status: She is alert.         Assessment:       1. Health care maintenance    2. Prothrombin [id number] mutation    3. Mild episode of recurrent major depressive disorder    4. Vitamin D insufficiency    5. Nephrolithiasis        Plan:       Rosa was seen today for annual exam.    Diagnoses and all orders for this visit:    Health care maintenance  -     CBC Auto Differential; Future  -     Comprehensive Metabolic Panel; Future  -     Hemoglobin A1C; Future  -     Lipid Panel; Future  -     TSH; Future    Prothrombin [id number] mutation    Mild episode of recurrent major depressive disorder - follows regularly with psychiatrist Dr. Flores, depression controlled, situational anxiety with state of the [place] currently, works from home and enjoys, she is a , support from family as parents and brother live nearby    Vitamin D insufficiency - normalized in labs from 2024    Nephrolithiasis - h/o, drinks plenty of water    Health and safety issues reviewed  Get lab work today  Flu and covid today    Walking 2/day with her dogs    Pt has been given instructions populated from patient instructions database and has verbalized understanding of the after visit summary and information contained wherein.    Follow up with a primary care provider. May go to ER for acute shortness of breath, lightheadedness, fever, or any other emergent complaints or changes in condition.    "This note will be shared with the patient"    Future " Appointments   Date Time Provider Department Center   3/24/2025  8:20 AM LAB, APPOINTMENT [facility]   3/24/2025  9:00 AM COVID VACCINE, PR[facility]   4/1/2025 11:00 AM Gricelda Dover MD [facility] [place]   4/23/2025  3:00 PM Gricelda Dover MD [facility] [place]

## 2025-03-27 ENCOUNTER — LAB VISIT (OUTPATIENT)
Dept: LAB | Facility: HOSPITAL | Age: 35
End: 2025-03-27
Payer: COMMERCIAL

## 2025-03-27 DIAGNOSIS — Z00.00 HEALTH CARE MAINTENANCE: ICD-10-CM

## 2025-03-27 LAB
EAG (OHS): 82 MG/DL (ref 68–131)
HBA1C MFR BLD: 4.5 % (ref 4–5.6)

## 2025-03-27 PROCEDURE — 83036 HEMOGLOBIN GLYCOSYLATED A1C: CPT

## 2025-03-27 PROCEDURE — 80061 LIPID PANEL: CPT

## 2025-03-27 PROCEDURE — 80053 COMPREHEN METABOLIC PANEL: CPT

## 2025-03-27 PROCEDURE — 85025 COMPLETE CBC W/AUTO DIFF WBC: CPT

## 2025-03-27 PROCEDURE — 36415 COLL VENOUS BLD VENIPUNCTURE: CPT

## 2025-03-27 PROCEDURE — 84443 ASSAY THYROID STIM HORMONE: CPT

## 2025-03-28 LAB
ABSOLUTE EOSINOPHIL (OHS): 0.31 K/UL
ABSOLUTE MONOCYTE (OHS): 0.67 K/UL (ref 0.3–1)
ABSOLUTE NEUTROPHIL COUNT (OHS): 3.79 K/UL (ref 1.8–7.7)
ALBUMIN SERPL BCP-MCNC: 3.9 G/DL (ref 3.5–5.2)
ALP SERPL-CCNC: 59 UNIT/L (ref 40–150)
ALT SERPL W/O P-5'-P-CCNC: 15 UNIT/L (ref 10–44)
ANION GAP (OHS): 13 MMOL/L (ref 8–16)
AST SERPL-CCNC: 19 UNIT/L (ref 11–45)
BASOPHILS # BLD AUTO: 0.09 K/UL
BASOPHILS NFR BLD AUTO: 1.1 %
BILIRUB SERPL-MCNC: 0.5 MG/DL (ref 0.1–1)
BUN SERPL-MCNC: 6 MG/DL (ref 6–20)
CALCIUM SERPL-MCNC: 8.6 MG/DL (ref 8.7–10.5)
CHLORIDE SERPL-SCNC: 104 MMOL/L (ref 95–110)
CHOLEST SERPL-MCNC: 165 MG/DL (ref 120–199)
CHOLEST/HDLC SERPL: 2.5 {RATIO} (ref 2–5)
CO2 SERPL-SCNC: 21 MMOL/L (ref 23–29)
CREAT SERPL-MCNC: 0.7 MG/DL (ref 0.5–1.4)
ERYTHROCYTE [DISTWIDTH] IN BLOOD BY AUTOMATED COUNT: 12.8 % (ref 11.5–14.5)
GFR SERPLBLD CREATININE-BSD FMLA CKD-EPI: >60 ML/MIN/1.73/M2
GLUCOSE SERPL-MCNC: 74 MG/DL (ref 70–110)
HCT VFR BLD AUTO: 41 % (ref 37–48.5)
HDLC SERPL-MCNC: 66 MG/DL (ref 40–75)
HDLC SERPL: 40 % (ref 20–50)
HGB BLD-MCNC: 13.6 GM/DL (ref 12–16)
IMM GRANULOCYTES # BLD AUTO: 0.01 K/UL (ref 0–0.04)
IMM GRANULOCYTES NFR BLD AUTO: 0.1 % (ref 0–0.5)
LDLC SERPL CALC-MCNC: 84.4 MG/DL (ref 63–159)
LYMPHOCYTES # BLD AUTO: 3.64 K/UL (ref 1–4.8)
MCH RBC QN AUTO: 30 PG (ref 27–50)
MCHC RBC AUTO-ENTMCNC: 33.2 G/DL (ref 32–36)
MCV RBC AUTO: 91 FL (ref 82–98)
NONHDLC SERPL-MCNC: 99 MG/DL
NUCLEATED RBC (/100WBC) (OHS): 0 /100 WBC
PLATELET # BLD AUTO: 306 K/UL (ref 150–450)
PMV BLD AUTO: 10.3 FL (ref 9.2–12.9)
POTASSIUM SERPL-SCNC: 4 MMOL/L (ref 3.5–5.1)
PROT SERPL-MCNC: 6.9 GM/DL (ref 6–8.4)
RBC # BLD AUTO: 4.53 M/UL (ref 4–5.4)
RELATIVE EOSINOPHIL (OHS): 3.6 %
RELATIVE LYMPHOCYTE (OHS): 42.8 % (ref 18–48)
RELATIVE MONOCYTE (OHS): 7.9 % (ref 4–15)
RELATIVE NEUTROPHIL (OHS): 44.5 % (ref 38–73)
SODIUM SERPL-SCNC: 138 MMOL/L (ref 136–145)
TRIGL SERPL-MCNC: 73 MG/DL (ref 30–150)
TSH SERPL-ACNC: 2.02 UIU/ML (ref 0.4–4)
WBC # BLD AUTO: 8.51 K/UL (ref 3.9–12.7)

## 2025-03-31 ENCOUNTER — RESULTS FOLLOW-UP (OUTPATIENT)
Dept: INTERNAL MEDICINE | Facility: CLINIC | Age: 35
End: 2025-03-31

## 2025-04-01 ENCOUNTER — OFFICE VISIT (OUTPATIENT)
Dept: OBSTETRICS AND GYNECOLOGY | Facility: CLINIC | Age: 35
End: 2025-04-01
Payer: COMMERCIAL

## 2025-04-01 VITALS
WEIGHT: 134.69 LBS | SYSTOLIC BLOOD PRESSURE: 122 MMHG | BODY MASS INDEX: 26.44 KG/M2 | DIASTOLIC BLOOD PRESSURE: 78 MMHG | HEIGHT: 60 IN

## 2025-04-01 DIAGNOSIS — Z30.2 ENCOUNTER FOR STERILIZATION: Primary | ICD-10-CM

## 2025-04-01 DIAGNOSIS — D68.52 HETEROZYGOUS FOR PROTHROMBIN G20210A MUTATION: ICD-10-CM

## 2025-04-01 PROCEDURE — 3078F DIAST BP <80 MM HG: CPT | Mod: CPTII,S$GLB,, | Performed by: STUDENT IN AN ORGANIZED HEALTH CARE EDUCATION/TRAINING PROGRAM

## 2025-04-01 PROCEDURE — 3074F SYST BP LT 130 MM HG: CPT | Mod: CPTII,S$GLB,, | Performed by: STUDENT IN AN ORGANIZED HEALTH CARE EDUCATION/TRAINING PROGRAM

## 2025-04-01 PROCEDURE — 99999 PR PBB SHADOW E&M-EST. PATIENT-LVL III: CPT | Mod: PBBFAC,,, | Performed by: STUDENT IN AN ORGANIZED HEALTH CARE EDUCATION/TRAINING PROGRAM

## 2025-04-01 PROCEDURE — 99213 OFFICE O/P EST LOW 20 MIN: CPT | Mod: S$GLB,,, | Performed by: STUDENT IN AN ORGANIZED HEALTH CARE EDUCATION/TRAINING PROGRAM

## 2025-04-01 PROCEDURE — 3008F BODY MASS INDEX DOCD: CPT | Mod: CPTII,S$GLB,, | Performed by: STUDENT IN AN ORGANIZED HEALTH CARE EDUCATION/TRAINING PROGRAM

## 2025-04-01 PROCEDURE — 3044F HG A1C LEVEL LT 7.0%: CPT | Mod: CPTII,S$GLB,, | Performed by: STUDENT IN AN ORGANIZED HEALTH CARE EDUCATION/TRAINING PROGRAM

## 2025-04-08 ENCOUNTER — ANESTHESIA EVENT (OUTPATIENT)
Dept: SURGERY | Facility: HOSPITAL | Age: 35
End: 2025-04-08
Payer: COMMERCIAL

## 2025-04-08 NOTE — ANESTHESIA PREPROCEDURE EVALUATION
"[facility]  Anesthesia Pre-Operative Evaluation         Patient Name: Rosa Limon  YOB: 1990  MRN: [id number]    SUBJECTIVE:     Pre-operative evaluation for Procedure(s) (LRB):  SALPINGECTOMY, LAPAROSCOPIC (Bilateral)     04/10/2025    Rosa Limon is a 35 y.o. female w/ a significant PMHx of cerebral venous thrombosis with CVA 10 years ago in the setting of oral estrogen contraceptive meds and dx of genetic coagulation factor II.  She was on warfarin for 1 year and discontinued oral contraceptive.  She was then cleared to stop blood thinner 1 year ago.  She has no residual neurologic deficits.    Patient now presents for the above procedure(s).    TTE:   No echocardiogram results found for the past 12 months     Problem List[1]    Review of patient's allergies indicates:   Allergen Reactions    Metoclopramide      Makes her irratated. "feels like she is jumping out of her skin" per patient.       Current Inpatient Medications:      Medications Ordered Prior to Encounter[2]    Past Surgical History:   Procedure Laterality Date    CERVICAL BIOPSY  W/ LOOP ELECTRODE EXCISION  11/2017    COLPOSCOPY  10/12/2017    CYSTOSCOPY  5/30/2022    Procedure: CYSTOSCOPY;  Surgeon: Sharon Zelaya MD;  Location: [facility] OR [address] [place] [address];  Service: Urology;;    CYSTOSCOPY N/A 1/30/2023    Procedure: CYSTOSCOPY;  Surgeon: Sharon Zelaya MD;  Location: [facility] OR [address] [place] [address];  Service: Urology;  Laterality: N/A;    CYSTOSCOPY  2/13/2023    Procedure: CYSTOSCOPY;  Surgeon: Sharon Zelaya MD;  [facility];  Service: Urology;;    FRACTURE SURGERY      HYSTEROSCOPY N/A 2/26/2021    Procedure: HYSTEROSCOPY;  Surgeon: Radha Nino MD;  Location: [facility] OR;  Service: OB/GYN;  Laterality: N/A;    ORIF wrist left Left 5/1/2015    REMOVAL, STENT, URETER  2/13/2023    Procedure: REMOVAL, STENT, URETER;  Surgeon: Sharon Zelaya MD;  [facility];  " Service: Urology;;    REPLACEMENT OF STENT Left 2/13/2023    Procedure: REPLACEMENT, STENT;  Surgeon: Sharon Zelaya MD;  [facility];  Service: Urology;  Laterality: Left;    TONSILLECTOMY      URETERAL STENT PLACEMENT Right 5/30/2022    Procedure: INSERTION, STENT, URETER;  Surgeon: Sharon Zelaya MD;  Location: [facility] OR [facility];  Service: Urology;  Laterality: Right;    URETERAL STENT PLACEMENT Left 1/30/2023    Procedure: INSERTION, STENT, URETER;  Surgeon: Sharon Zelaya MD;  [facility];  Service: Urology;  Laterality: Left;    URETEROSCOPIC REMOVAL OF URETERIC CALCULUS Right 5/30/2022    Procedure: REMOVAL, CALCULUS, URETER, URETEROSCOPIC;  Surgeon: Sharon Zelaya MD;  [facility];  Service: Urology;  Laterality: Right;    URETEROSCOPIC REMOVAL OF URETERIC CALCULUS Left 2/13/2023    Procedure: REMOVAL, CALCULUS, URETER, URETEROSCOPIC;  Surgeon: Sharon Zelaya MD;  [facility];  Service: Urology;  Laterality: Left;    URETEROSCOPY Right 5/30/2022    Procedure: URETEROSCOPY;  Surgeon: Sharon Zelaya MD;  [facility];  Service: Urology;  Laterality: Right;    URETEROSCOPY Left 1/30/2023    Procedure: URETEROSCOPY;  Surgeon: Sharon Zelaya MD;  [facility];  Service: Urology;  Laterality: Left;    URETEROSCOPY Left 2/13/2023    Procedure: URETEROSCOPY;  Surgeon: Sharon Zelaya MD;  Location: [facility] OR [address] [place] [address];  Service: Urology;  Laterality: Left;       OBJECTIVE:     Vital Signs Range (Last 24H):  Temp:  [36.6 °C (97.9 °F)]   Pulse:  [80]   Resp:  [18]   BP: (104)/(65)   SpO2:  [100 %]       Significant Labs:  Lab Results   Component Value Date    WBC 8.51 03/27/2025    HGB 13.6 03/27/2025    HCT 41.0 03/27/2025     03/27/2025    CHOL 165 03/27/2025    TRIG 73 03/27/2025    HDL 66 03/27/2025    ALT 15 03/27/2025    AST 19 03/27/2025     03/27/2025    K 4.0  03/27/2025     03/27/2025    CREATININE 0.7 03/27/2025    BUN 6 03/27/2025    CO2 21 (L) 03/27/2025    TSH 2.018 03/27/2025    INR 0.8 10/03/2017    HGBA1C 4.5 03/27/2025       Diagnostic Studies: No relevant studies.    EKG:   reviewed    ASSESSMENT/PLAN:       Pre-op Assessment    I have reviewed the Patient Summary Reports.     I have reviewed the Nursing Notes. I have reviewed the NPO Status.   I have reviewed the Medications.     Review of Systems  Anesthesia Hx:  No problems with previous Anesthesia             Denies Family Hx of Anesthesia complications.    Denies Personal Hx of Anesthesia complications.                    Social:  Non-Smoker       Hematology/Oncology:       -- Denies Anemia:                  Denies Current/Recent Cancer                Cardiovascular:      Denies Hypertension.   Denies MI.  Denies CAD.     Denies Dysrhythmias.    Denies CHF.    no hyperlipidemia   ECG has been reviewed.    Functional Capacity good / => 4 METS                         Pulmonary:    Denies COPD.  Denies Asthma.   Denies Shortness of breath.   Denies Sleep Apnea.                Renal/:   Denies Chronic Renal Disease.                Hepatic/GI:      Denies GERD.                Musculoskeletal:  Musculoskeletal Normal                Neurological:  Denies TIA. CVA   Headaches Denies Seizures.                                Endocrine:  Endocrine Normal                Physical Exam  General: Well nourished    Airway:  Mallampati: II   Mouth Opening: Normal  TM Distance: Normal  Tongue: Normal  Neck ROM: Normal ROM    Dental:  Intact    Chest/Lungs:  Normal Respiratory Rate    Heart:  Rate: Normal      Anesthesia Plan  Type of Anesthesia, risks & benefits discussed:    Anesthesia Type: Gen ETT  Intra-op Monitoring Plan: Standard ASA Monitors  Post Op Pain Control Plan: multimodal analgesia  Induction:  IV  Airway Plan: Video, Post-Induction  Informed Consent: Informed consent signed with the Patient and all  parties understand the risks and agree with anesthesia plan.  All questions answered.   ASA Score: 2  Day of Surgery Review of History & Physical: H&P Update referred to the surgeon/provider.    Ready For Surgery From Anesthesia Perspective.     .           [1]   Patient Active Problem List  Diagnosis    Cerebral venous thrombosis    Headache    Dural sinus thrombosis    Cephalalgia    Migraine aura, persistent, intractable, with status migrainosus    Mild episode of recurrent major depressive disorder    Abnormal albumin    Cerebral venous sinus thrombosis    Heterozygous for prothrombin [id number] mutation    Vitamin D insufficiency    Nephrolithiasis   [2]   Current Facility-Administered Medications on File Prior to Encounter   Medication Dose Route Frequency Provider Last Rate Last Admin    0.9%  NaCl infusion   Intravenous Continuous Acosta Molina MD   Stopped at 05/30/22 1417    ceFAZolin in sterile water 2 gram/20 mL IV syringe 2,000 mg  2 g Intravenous On Call Procedure Acosta Molina MD        dextrose 5 % in lactated ringers infusion   Intravenous Continuous Radha Nino MD        doxycycline (VIBRAMYCIN) 100mg in dextrose 5% 250 mL IVPB (ready to mix)  100 mg Intravenous On Call Procedure Radha Nino MD   100 mg at 02/26/21 0833    LIDOcaine (PF) 10 mg/ml (1%) injection 10 mg  1 mL Intradermal Once Acosta Molina MD         Current Outpatient Medications on File Prior to Encounter   Medication Sig Dispense Refill    sertraline (ZOLOFT) 100 MG tablet Take 100 mg by mouth 2 (two) times daily.      gabapentin (NEURONTIN) 300 MG capsule Take 300 mg by mouth every evening. (Patient not taking: Reported on 4/1/2025)      levonorgestreL (LILETTA) 20.4 mcg/24 hrs (8 yrs) 52 mg IUD 1 Intra Uterine Device by Intrauterine route once.

## 2025-04-09 RX ORDER — ENOXAPARIN SODIUM 100 MG/ML
40 INJECTION SUBCUTANEOUS DAILY
Qty: 2.8 ML | Refills: 0 | Status: SHIPPED | OUTPATIENT
Start: 2025-04-09 | End: 2025-04-16

## 2025-04-09 NOTE — PRE-PROCEDURE INSTRUCTIONS
The following was discussed with pt via phone and sent to pt portal. Pt verbalized understanding. Pt to be accompanied by her mother.    Dear Rosa ,    You are scheduled for a procedure with Dr. Dover on 4/10/2025. Your scheduled arrival time is 5:30am.  This arrival time is roughly 1.5-2 hours before your anticipated procedure time to allow sufficient time for pre-op.  Please wear comfortable clothes.  This procedure will take place at the [facility] at the corner of [address] [facility] and [facility].  [facility] [place] to [facility].  The address is:    [address].  [place] [address]    After entering the building, you will proceed to the second floor where you can check in with registration. You should take any medications that you routinely take for blood pressure (other than those listed below), heart medications, thyroid, cholesterol, etc.     If you wear contact lenses, please wear glasses to your procedure.    Your fasting instructions are as follow:  Nothing to eat after midnight the evening before your surgery. STOP drinking clear liquids 2 hours prior to your arrival time. Anesthesia encourages this to ensure that you are adequately hydrated. Clear liquids include water, clear juices, Gatorade, black coffee (no milk, no cream), or soda. It does not include anything with pulp such as broth, ice cream, or jello.     You MUST have a responsible adult to bring you home.      The evening before and morning of your procedure, please hold the following medications:  -Aspirin and Aspirin-containing products (Goody's powder, Excedrin)  -NSAIDs (Advil, Ibuprofen, Aleve, Diclofenac)  -Vitamins/Supplements  -Herbal remedies/Teas  -Stimulants (Adderall, Vyvanse, Adipex)  -Diabetic medication (Please bring with you day of procedure)  -Prescription creams/gels/lotions    -May take Tylenol      The evening before and morning of your procedure, take a shower using  antibacterial soap (ex: Hibiclens or Dial antibacterial soap). DO NOT apply deodorant, lotion, cologne, or anything else to the skin. Wear loose, comfortable fitting clothing. Do not wear jewelry or bring any valuables with you. If you wear dentures or contacts, please bring your case with you or leave them at home. Use and bring any inhalers that you may have.    If you have any procedure-specific questions, please call your surgeon's office. Any other questions, don't hesitate to call at [phone].    ON THE MORNING OF SURGERY, if you experience any issues, please call our Pre-op Desk a call at [phone].    Thanks,  Pre-Admit Testing Team  Anesthesia Dept [facility]

## 2025-04-10 ENCOUNTER — ANESTHESIA (OUTPATIENT)
Dept: SURGERY | Facility: HOSPITAL | Age: 35
End: 2025-04-10
Payer: COMMERCIAL

## 2025-04-10 ENCOUNTER — HOSPITAL ENCOUNTER (OUTPATIENT)
Facility: HOSPITAL | Age: 35
Discharge: HOME | End: 2025-04-10
Attending: STUDENT IN AN ORGANIZED HEALTH CARE EDUCATION/TRAINING PROGRAM | Admitting: STUDENT IN AN ORGANIZED HEALTH CARE EDUCATION/TRAINING PROGRAM
Payer: COMMERCIAL

## 2025-04-10 VITALS
SYSTOLIC BLOOD PRESSURE: 88 MMHG | RESPIRATION RATE: 20 BRPM | HEART RATE: 92 BPM | DIASTOLIC BLOOD PRESSURE: 56 MMHG | BODY MASS INDEX: 25 KG/M2 | WEIGHT: 128 LBS | TEMPERATURE: 98 F | OXYGEN SATURATION: 100 %

## 2025-04-10 DIAGNOSIS — Z98.890 STATUS POST LAPAROSCOPIC SURGERY: Primary | ICD-10-CM

## 2025-04-10 DIAGNOSIS — Z30.2 ENCOUNTER FOR STERILIZATION: ICD-10-CM

## 2025-04-10 LAB
B-HCG UR QL: NEGATIVE
CTP QC/QA: YES

## 2025-04-10 PROCEDURE — 27201423 OPTIME MED/SURG SUP & DEVICES STERILE SUPPLY: Performed by: STUDENT IN AN ORGANIZED HEALTH CARE EDUCATION/TRAINING PROGRAM

## 2025-04-10 PROCEDURE — 63600175 PHARM REV CODE 636 W HCPCS: Performed by: STUDENT IN AN ORGANIZED HEALTH CARE EDUCATION/TRAINING PROGRAM

## 2025-04-10 PROCEDURE — 88302 TISSUE EXAM BY PATHOLOGIST: CPT | Mod: TC,91 | Performed by: STUDENT IN AN ORGANIZED HEALTH CARE EDUCATION/TRAINING PROGRAM

## 2025-04-10 PROCEDURE — 71000015 HC POSTOP RECOV 1ST HR: Performed by: STUDENT IN AN ORGANIZED HEALTH CARE EDUCATION/TRAINING PROGRAM

## 2025-04-10 PROCEDURE — 58661 LAPAROSCOPY REMOVE ADNEXA: CPT | Mod: 50,,, | Performed by: STUDENT IN AN ORGANIZED HEALTH CARE EDUCATION/TRAINING PROGRAM

## 2025-04-10 PROCEDURE — 37000009 HC ANESTHESIA EA ADD 15 MINS: Performed by: STUDENT IN AN ORGANIZED HEALTH CARE EDUCATION/TRAINING PROGRAM

## 2025-04-10 PROCEDURE — 36000708 HC OR TIME LEV III 1ST 15 MIN: Performed by: STUDENT IN AN ORGANIZED HEALTH CARE EDUCATION/TRAINING PROGRAM

## 2025-04-10 PROCEDURE — 63600175 PHARM REV CODE 636 W HCPCS: Performed by: NURSE ANESTHETIST, CERTIFIED REGISTERED

## 2025-04-10 PROCEDURE — 25000003 PHARM REV CODE 250: Performed by: NURSE ANESTHETIST, CERTIFIED REGISTERED

## 2025-04-10 PROCEDURE — 71000033 HC RECOVERY, INTIAL HOUR: Performed by: STUDENT IN AN ORGANIZED HEALTH CARE EDUCATION/TRAINING PROGRAM

## 2025-04-10 PROCEDURE — 37000008 HC ANESTHESIA 1ST 15 MINUTES: Performed by: STUDENT IN AN ORGANIZED HEALTH CARE EDUCATION/TRAINING PROGRAM

## 2025-04-10 PROCEDURE — 36000709 HC OR TIME LEV III EA ADD 15 MIN: Performed by: STUDENT IN AN ORGANIZED HEALTH CARE EDUCATION/TRAINING PROGRAM

## 2025-04-10 PROCEDURE — 25000003 PHARM REV CODE 250: Performed by: STUDENT IN AN ORGANIZED HEALTH CARE EDUCATION/TRAINING PROGRAM

## 2025-04-10 PROCEDURE — 94761 N-INVAS EAR/PLS OXIMETRY MLT: CPT

## 2025-04-10 PROCEDURE — 81025 URINE PREGNANCY TEST: CPT | Performed by: STUDENT IN AN ORGANIZED HEALTH CARE EDUCATION/TRAINING PROGRAM

## 2025-04-10 PROCEDURE — 99900035 HC TECH TIME PER 15 MIN (STAT)

## 2025-04-10 PROCEDURE — 58661 LAPAROSCOPY REMOVE ADNEXA: CPT | Mod: 82,50,, | Performed by: OBSTETRICS & GYNECOLOGY

## 2025-04-10 PROCEDURE — 71000016 HC POSTOP RECOV ADDL HR: Performed by: STUDENT IN AN ORGANIZED HEALTH CARE EDUCATION/TRAINING PROGRAM

## 2025-04-10 RX ORDER — ENOXAPARIN SODIUM 100 MG/ML
40 INJECTION SUBCUTANEOUS EVERY 24 HOURS
Status: DISCONTINUED | OUTPATIENT
Start: 2025-04-10 | End: 2025-04-10

## 2025-04-10 RX ORDER — FENTANYL CITRATE 50 UG/ML
INJECTION, SOLUTION INTRAMUSCULAR; INTRAVENOUS
Status: DISCONTINUED | OUTPATIENT
Start: 2025-04-10 | End: 2025-04-10

## 2025-04-10 RX ORDER — MIDAZOLAM HYDROCHLORIDE 1 MG/ML
INJECTION INTRAMUSCULAR; INTRAVENOUS
Status: DISCONTINUED | OUTPATIENT
Start: 2025-04-10 | End: 2025-04-10

## 2025-04-10 RX ORDER — FAMOTIDINE 20 MG/1
20 TABLET, FILM COATED ORAL
Status: COMPLETED | OUTPATIENT
Start: 2025-04-10 | End: 2025-04-10

## 2025-04-10 RX ORDER — ONDANSETRON 4 MG/1
4 TABLET, ORALLY DISINTEGRATING ORAL
Qty: 30 TABLET | Refills: 0 | Status: SHIPPED | OUTPATIENT
Start: 2025-04-10

## 2025-04-10 RX ORDER — GLUCAGON 1 MG
1 KIT INJECTION
Status: DISCONTINUED | OUTPATIENT
Start: 2025-04-10 | End: 2025-04-10 | Stop reason: HOSPADM

## 2025-04-10 RX ORDER — ACETAMINOPHEN 10 MG/ML
INJECTION, SOLUTION INTRAVENOUS
Status: DISCONTINUED | OUTPATIENT
Start: 2025-04-10 | End: 2025-04-10

## 2025-04-10 RX ORDER — ONDANSETRON HYDROCHLORIDE 2 MG/ML
INJECTION, SOLUTION INTRAVENOUS
Status: DISCONTINUED | OUTPATIENT
Start: 2025-04-10 | End: 2025-04-10

## 2025-04-10 RX ORDER — ENOXAPARIN SODIUM 100 MG/ML
40 INJECTION SUBCUTANEOUS
Status: COMPLETED | OUTPATIENT
Start: 2025-04-10 | End: 2025-04-10

## 2025-04-10 RX ORDER — PROPOFOL 10 MG/ML
VIAL (ML) INTRAVENOUS
Status: DISCONTINUED | OUTPATIENT
Start: 2025-04-10 | End: 2025-04-10

## 2025-04-10 RX ORDER — ONDANSETRON HYDROCHLORIDE 2 MG/ML
4 INJECTION, SOLUTION INTRAVENOUS DAILY PRN
Status: DISCONTINUED | OUTPATIENT
Start: 2025-04-10 | End: 2025-04-10 | Stop reason: HOSPADM

## 2025-04-10 RX ORDER — ROCURONIUM BROMIDE 10 MG/ML
INJECTION, SOLUTION INTRAVENOUS
Status: DISCONTINUED | OUTPATIENT
Start: 2025-04-10 | End: 2025-04-10

## 2025-04-10 RX ORDER — OXYCODONE AND ACETAMINOPHEN 5; 325 MG/1; MG/1
1 TABLET ORAL EVERY 4 HOURS PRN
Qty: 10 TABLET | Refills: 0 | Status: SHIPPED | OUTPATIENT
Start: 2025-04-10

## 2025-04-10 RX ORDER — BUPIVACAINE HYDROCHLORIDE 2.5 MG/ML
INJECTION, SOLUTION EPIDURAL; INFILTRATION; INTRACAUDAL; PERINEURAL
Status: DISCONTINUED | OUTPATIENT
Start: 2025-04-10 | End: 2025-04-10 | Stop reason: HOSPADM

## 2025-04-10 RX ORDER — OXYCODONE HYDROCHLORIDE 5 MG/1
5 TABLET ORAL
Status: DISCONTINUED | OUTPATIENT
Start: 2025-04-10 | End: 2025-04-10 | Stop reason: HOSPADM

## 2025-04-10 RX ORDER — SILVER NITRATE 38.21; 12.74 MG/1; MG/1
STICK TOPICAL
Status: DISCONTINUED | OUTPATIENT
Start: 2025-04-10 | End: 2025-04-10 | Stop reason: HOSPADM

## 2025-04-10 RX ORDER — DEXAMETHASONE SODIUM PHOSPHATE 4 MG/ML
INJECTION, SOLUTION INTRA-ARTICULAR; INTRALESIONAL; INTRAMUSCULAR; INTRAVENOUS; SOFT TISSUE
Status: DISCONTINUED | OUTPATIENT
Start: 2025-04-10 | End: 2025-04-10

## 2025-04-10 RX ORDER — SODIUM CHLORIDE 0.9 % (FLUSH) 0.9 %
10 SYRINGE (ML) INJECTION
Status: DISCONTINUED | OUTPATIENT
Start: 2025-04-10 | End: 2025-04-10 | Stop reason: HOSPADM

## 2025-04-10 RX ORDER — HYDROMORPHONE HYDROCHLORIDE 1 MG/ML
0.2 INJECTION, SOLUTION INTRAMUSCULAR; INTRAVENOUS; SUBCUTANEOUS EVERY 5 MIN PRN
Status: DISCONTINUED | OUTPATIENT
Start: 2025-04-10 | End: 2025-04-10 | Stop reason: HOSPADM

## 2025-04-10 RX ORDER — SODIUM CHLORIDE 9 MG/ML
INJECTION, SOLUTION INTRAVENOUS CONTINUOUS
Status: DISCONTINUED | OUTPATIENT
Start: 2025-04-10 | End: 2025-04-10 | Stop reason: HOSPADM

## 2025-04-10 RX ORDER — DEXMEDETOMIDINE HYDROCHLORIDE 100 UG/ML
INJECTION, SOLUTION INTRAVENOUS
Status: DISCONTINUED | OUTPATIENT
Start: 2025-04-10 | End: 2025-04-10

## 2025-04-10 RX ORDER — LIDOCAINE HYDROCHLORIDE 20 MG/ML
INJECTION INTRAVENOUS
Status: DISCONTINUED | OUTPATIENT
Start: 2025-04-10 | End: 2025-04-10

## 2025-04-10 RX ADMIN — ENOXAPARIN SODIUM 40 MG: 40 INJECTION SUBCUTANEOUS at 06:04

## 2025-04-10 RX ADMIN — ACETAMINOPHEN 1000 MG: 10 INJECTION INTRAVENOUS at 07:04

## 2025-04-10 RX ADMIN — SODIUM CHLORIDE: 9 INJECTION, SOLUTION INTRAVENOUS at 07:04

## 2025-04-10 RX ADMIN — DEXAMETHASONE SODIUM PHOSPHATE 8 MG: 4 INJECTION INTRA-ARTICULAR; INTRALESIONAL; INTRAMUSCULAR; INTRAVENOUS; SOFT TISSUE at 07:04

## 2025-04-10 RX ADMIN — DEXMEDETOMIDINE HYDROCHLORIDE 8 MCG: 100 INJECTION, SOLUTION INTRAVENOUS at 08:04

## 2025-04-10 RX ADMIN — HYDROMORPHONE HYDROCHLORIDE 0.2 MG: 1 INJECTION, SOLUTION INTRAMUSCULAR; INTRAVENOUS; SUBCUTANEOUS at 08:04

## 2025-04-10 RX ADMIN — FENTANYL CITRATE 100 MCG: 50 INJECTION, SOLUTION INTRAMUSCULAR; INTRAVENOUS at 07:04

## 2025-04-10 RX ADMIN — DEXMEDETOMIDINE HYDROCHLORIDE 8 MCG: 100 INJECTION, SOLUTION INTRAVENOUS at 07:04

## 2025-04-10 RX ADMIN — MIDAZOLAM HYDROCHLORIDE 2 MG: 2 INJECTION, SOLUTION INTRAMUSCULAR; INTRAVENOUS at 07:04

## 2025-04-10 RX ADMIN — ROCURONIUM BROMIDE 50 MG: 10 INJECTION, SOLUTION INTRAVENOUS at 07:04

## 2025-04-10 RX ADMIN — SUGAMMADEX 200 MG: 100 INJECTION, SOLUTION INTRAVENOUS at 07:04

## 2025-04-10 RX ADMIN — OXYCODONE 5 MG: 5 TABLET ORAL at 08:04

## 2025-04-10 RX ADMIN — LIDOCAINE HYDROCHLORIDE 100 MG: 20 INJECTION INTRAVENOUS at 07:04

## 2025-04-10 RX ADMIN — ONDANSETRON 4 MG: 2 INJECTION INTRAMUSCULAR; INTRAVENOUS at 08:04

## 2025-04-10 RX ADMIN — PROPOFOL 200 MG: 10 INJECTION, EMULSION INTRAVENOUS at 07:04

## 2025-04-10 RX ADMIN — ONDANSETRON 4 MG: 2 INJECTION INTRAMUSCULAR; INTRAVENOUS at 07:04

## 2025-04-10 RX ADMIN — PROPOFOL 50 MG: 10 INJECTION, EMULSION INTRAVENOUS at 07:04

## 2025-04-10 RX ADMIN — FAMOTIDINE 20 MG: 20 TABLET ORAL at 06:04

## 2025-04-10 RX ADMIN — DEXMEDETOMIDINE HYDROCHLORIDE 4 MCG: 100 INJECTION, SOLUTION INTRAVENOUS at 07:04

## 2025-04-10 RX ADMIN — SODIUM CHLORIDE: 9 INJECTION, SOLUTION INTRAVENOUS at 06:04

## 2025-04-10 NOTE — ANESTHESIA POSTPROCEDURE EVALUATION
Anesthesia Post Evaluation    Patient: Rosa Limon    Procedure(s) Performed: Procedure(s) (LRB):  SALPINGECTOMY, LAPAROSCOPIC (Bilateral)    Final Anesthesia Type: general      Patient location during evaluation: PACU  Patient participation: Yes- Able to Participate  Level of consciousness: awake and alert, oriented and awake  Post-procedure vital signs: reviewed and stable  Pain management: adequate  Airway patency: patent  KAM mitigation strategies: Multimodal analgesia, Extubation while patient is awake and Verification of full reversal of neuromuscular block  PONV status at discharge: No PONV  Anesthetic complications: no      Cardiovascular status: hemodynamically stable  Respiratory status: spontaneous ventilation and room air  Hydration status: euvolemic  Follow-up not needed.              Vitals Value Taken Time   BP 88/56 04/10/25 10:15   Temp 36.7 °C (98.1 °F) 04/10/25 08:15   Pulse 92 04/10/25 10:15   Resp 20 04/10/25 10:15   SpO2 100 % 04/10/25 10:15         Event Time   Out of Recovery 08:30:00         Pain/Kari Score: Pain Rating Prior to Med Admin: 4 (4/10/2025  8:51 AM)  Kari Score: 10 (4/10/2025  8:30 AM)

## 2025-04-10 NOTE — OP NOTE
[facility] ([facility])  OBGYN  Operative Note    SUMMARY     Date of Procedure: 4/10/2025     Procedure: Procedure(s) (LRB):  SALPINGECTOMY, LAPAROSCOPIC (Bilateral)       Surgeon: Gricelda Dover MD    Assistant: Bunny Wild M.D.    Pre-Operative Diagnosis: Encounter for sterilization [Z30.2]    Post-Operative Diagnosis: Status post laparoscopic bilateral salpingectomy    Anesthesia: General    Technical Procedures Used: Laparoscopic bilateral salpingectomy    Complications: No    Estimated Blood Loss (EBL): Minimal    Findings:   Small, mobile, mid-position uterus with normal appearing bilateral tubes and ovaries  Large and small intestines, appendix, liver edge all normal in appearance, gallbladder slightly distended  Pictures in Media    Indications: Sterilization    Procedure in Detail:        Ms. Limon was taken to the operating room, and a time out was performed. General anesthesia was performed. The patient was then prepped and draped in the normal sterile fashion. She was placed in the dorsal lithotomy position in Vijay stirrups. Her arms were tucked in the usual fashion. A arroyo was placed to gravity. A uterine manipulator was then placed.      Attention was then turned abdomen, and a 5 mm skin incision was made with a knife. Towel clamps were used to elevate the abdomen. The veress needle was used to enter the abdomen without difficulty with a starting pressure of 5 mm Hg. The abdomen was then insufflated to 15 mmHg. A 5 mm bladeless trocar was then placed using the visiport without difficulty. The patient was then placed in trendelenburg position. A 5 mm bladeless trocar was placed in the LLQ in the usual fashion. A 5 mm  bladeless trocar was placed in the RLQ in the usual fashion.   The abdomen was then evaluated and the findings were as noted above. Left tube was identified out to the fimbriated end, grasped by assistant with atraumatic grasper and the Ligasure device was then  used to coagulate and excise tube free. Tube was then removed from abdomen through trocar. Same procedure was performed on right tube. Bilateral tubes with disposition to pathology.     The trocars were then removed. The skin incisions were closed using 4-0 Monocryl. The instruments were removed from the vagina. IUD strings 2 cm per os. Tenaculum sites oozing, excellent hemostasis after silver nitrate placement.   Left labia noted larger than right at cessation of case, palpated with crepitus, suspect subcutaneous emphysema traveled to labia upon leveling out patient. No evidence of hematoma.  The patient tolerated the procedure well. Sponge lap and needle counts correct x 2.     A qualified resident was not available for this procedure.

## 2025-04-10 NOTE — H&P
GYN PRE-OP H&P    Chief Complaint:  Pre-op Exam      Problem List[1]    History of Present Illness    Rosa Limon is a 35 y.o.  here for preop visit.   Pt desires permanent sterilization. Has never had children and does not desire future childbearing. Reports she is 100% positive of decision. She has IUD in place for cycle control and desires to keep this in place.  No new complaints or concerns today.    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        Past Surgical History:   Procedure Laterality Date    CERVICAL BIOPSY  W/ LOOP ELECTRODE EXCISION  2017    COLPOSCOPY  10/12/2017    CYSTOSCOPY  2022    Procedure: CYSTOSCOPY;  Surgeon: Sharon Zelaya MD;  Location: [address];  Service: Urology;;    CYSTOSCOPY N/A 2023    Procedure: CYSTOSCOPY;  Surgeon: Sharon Zelaya MD;  Location: [address];  Service: Urology;  Laterality: N/A;    CYSTOSCOPY  2023    Procedure: CYSTOSCOPY;  Surgeon: Sharon Zelaya MD;  Location: [address];  Service: Urology;;    FRACTURE SURGERY      HYSTEROSCOPY N/A 2021    Procedure: HYSTEROSCOPY;  Surgeon: Radha Nino MD;  Location: [facility] OR;  Service: OB/GYN;  Laterality: N/A;    ORIF wrist left Left 2015    REMOVAL, STENT, URETER  2023    Procedure: REMOVAL, STENT, URETER;  Surgeon: Sharon Zelaya MD;  Location: [address];  Service: Urology;;    REPLACEMENT OF STENT Left 2023    Procedure: REPLACEMENT, STENT;  Surgeon: Sharon Zelaya MD;  Location: [address];   Service: Urology;  Laterality: Left;    TONSILLECTOMY      URETERAL STENT PLACEMENT Right 2022    Procedure: INSERTION, STENT, URETER;  Surgeon: Sharon Zelaya MD;  Location: [facility] OR 1ST FLR;  Service: Urology;  Laterality: Right;    URETERAL STENT PLACEMENT Left 2023    Procedure: INSERTION, STENT, URETER;  Surgeon: Sharon Zelaya MD;  [facility];  Service: Urology;  Laterality: Left;    URETEROSCOPIC REMOVAL OF URETERIC CALCULUS Right 2022    Procedure: REMOVAL, CALCULUS, URETER, URETEROSCOPIC;  Surgeon: Sharon Zelaya MD;  [facility];  Service: Urology;  Laterality: Right;    URETEROSCOPIC REMOVAL OF URETERIC CALCULUS Left 2023    Procedure: REMOVAL, CALCULUS, URETER, URETEROSCOPIC;  Surgeon: Sharon Zelaya MD;  [facility];  Service: Urology;  Laterality: Left;    URETEROSCOPY Right 2022    Procedure: URETEROSCOPY;  Surgeon: Sharon Zelaya MD;  [facility];  Service: Urology;  Laterality: Right;    URETEROSCOPY Left 2023    Procedure: URETEROSCOPY;  Surgeon: Sharon Zelaya MD;  [facility];  Service: Urology;  Laterality: Left;    URETEROSCOPY Left 2023    Procedure: URETEROSCOPY;  Surgeon: Sharon Zelaya MD;  Location: [facility] OR [address] [place] [address];  Service: Urology;  Laterality: Left;       Family History   Problem Relation Name Age of Onset    Cancer Mother Mom 58        breast cancer    Breast cancer Mother Mom     Aortic aneurysm Father      Diabetes Paternal Uncle      Hypertension Maternal Grandmother      Diabetes Maternal Grandfather [place]     Heart disease Neg Hx      Stroke Neg Hx      Anesthesia problems Neg Hx         Social History[2]    OB History    Para Term  AB Living   0 0 0 0 0 0   SAB IAB Ectopic Multiple Live Births   0 0 0 0    Obstetric Comments   Menarche- 10       No LMP recorded. Patient has had an implant.       Review of  "Systems  GENERAL: Denies unintentional weight gain or weight loss. Feeling well overall.   SKIN: Denies rash or lesions.   HEENT: Denies headaches, or vision changes.   CARDIOVASCULAR: Denies palpitations or chest pain.   RESPIRATORY: Denies shortness of breath or dyspnea on exertion.  BREASTS: Denies pain, lumps, or nipple discharge.   ABDOMEN: Denies abdominal pain, constipation, diarrhea, nausea, vomiting, change in appetite.  URINARY: Denies frequency, dysuria, hematuria.  NEUROLOGIC: Denies syncope or weakness.   PSYCHIATRIC: Denies depression, anxiety or mood swings.     Objective:     /78   Ht 5' (1.524 m)   Wt 61.1 kg (134 lb 11.2 oz)   BMI 26.31 kg/m²     Body mass index is 26.31 kg/m².    APPEARANCE: Well nourished, well developed, in no acute distress.  PSYCH: Appropriate mood and affect.  SKIN: No acne or hirsutism  NECK: Neck symmetric without masses or thyromegaly  NODES: No inguinal, axillary, or supraclavicular lymph node enlargement  CHEST: Normal respiratory effort,  ABDOMEN: Soft.  No tenderness or masses.    EXTREMITIES: No edema       Last Pap: NILM -HPV 10/7/2024    Assessment/ Plan:     1. Encounter for sterilization        2. Heterozygous for prothrombin [id number] mutation            1. To OR for  Laparoscopic bilateral salpingectomy   on 4/10/25  2. Consents reviewed and signed      Counseling     With the patient I had a full discussion of the risks, benefits, and alternatives of all procedures to be performed, including but not limited to injury to other organs, failure to resolve problem, recurrence of disease state, infection, need for open procedure. We discussed the risks, benefits, and alternatives to blood transfusion as well.   She agrees with the plan of care; therefore, we will proceed with the surgery as scheduled.     Finesse consult to on-call Heme Dr Jiménez regarding gerber-operative management:  Pt in "moderate risk" for VTE given heterozygous prothrombin mutation in " setting of prior VTE. Recommendation is post-operative prophylaxis lovenox daily for 7 days. Discussed with patient and she voiced understanding, will send into pharmacy.       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     Social Drivers of Health     Financial Resource Strain: Low Risk  (2/20/2024)    Overall Financial Resource Strain (CARDIA)     Difficulty of Paying Living Expenses: Not very hard   Food Insecurity: No Food Insecurity (2/20/2024)    Hunger Vital Sign     Worried About Running Out of Food in the Last Year: Never true     Ran Out of Food in the Last Year: Never true   Transportation Needs: No Transportation Needs (2/20/2024)    PRAPARE - Transportation     Lack of Transportation (Medical): No     Lack of Transportation (Non-Medical): No   Physical Activity: Insufficiently Active (2/20/2024)    Exercise Vital Sign     Days of Exercise per Week: 2 days     Minutes of Exercise per Session: 30 min   Stress: Stress Concern Present (2/20/2024)    [employer] - Occupational Stress Questionnaire     Feeling of Stress : To some extent   Housing Stability: Low Risk  (2/20/2024)    Housing Stability Vital Sign     Unable to Pay for Housing in the Last Year: No     Number of Places  Lived in the Last Year: 1     Unstable Housing in the Last Year: No

## 2025-04-10 NOTE — PLAN OF CARE
Anesthesia MD notified of hypotension. BP 88/56 (67). Patient asymptomatic at this time. Per MD, okay to discharge patient home. IV removed per protocol. AVS reviewed and discussed with patient; patient verbalizes understanding. Patient escorted to private vehicle via wheelchair.

## 2025-04-10 NOTE — H&P (VIEW-ONLY)
GYN PRE-OP H&P    Chief Complaint:  Pre-op Exam      Problem List[1]    History of Present Illness    Rosa Limon is a 35 y.o.  here for preop visit.   Pt desires permanent sterilization. Has never had children and does not desire future childbearing. Reports she is 100% positive of decision. She has IUD in place for cycle control and desires to keep this in place.  No new complaints or concerns today.    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        Past Surgical History:   Procedure Laterality Date    CERVICAL BIOPSY  W/ LOOP ELECTRODE EXCISION  2017    COLPOSCOPY  10/12/2017    CYSTOSCOPY  2022    Procedure: CYSTOSCOPY;  Surgeon: Sharon Zelaya MD;  Location: [address];  Service: Urology;;    CYSTOSCOPY N/A 2023    Procedure: CYSTOSCOPY;  Surgeon: Shaorn Zelaya MD;  Location: [address];  Service: Urology;  Laterality: N/A;    CYSTOSCOPY  2023    Procedure: CYSTOSCOPY;  Surgeon: Sharon Zelaya MD;  Location: [address];  Service: Urology;;    FRACTURE SURGERY      HYSTEROSCOPY N/A 2021    Procedure: HYSTEROSCOPY;  Surgeon: Radha Nino MD;  Location: [facility] OR;  Service: OB/GYN;  Laterality: N/A;    ORIF wrist left Left 2015    REMOVAL, STENT, URETER  2023    Procedure: REMOVAL, STENT, URETER;  Surgeon: Sharon Zelaya MD;  Location: [address];  Service: Urology;;    REPLACEMENT OF STENT Left 2023    Procedure: REPLACEMENT, STENT;  Surgeon: Sharon Zelaya MD;  Location: [address];   Service: Urology;  Laterality: Left;    TONSILLECTOMY      URETERAL STENT PLACEMENT Right 2022    Procedure: INSERTION, STENT, URETER;  Surgeon: Sharon Zelaya MD;  Location: [facility] OR 1ST FLR;  Service: Urology;  Laterality: Right;    URETERAL STENT PLACEMENT Left 2023    Procedure: INSERTION, STENT, URETER;  Surgeon: Sharon Zelaya MD;  [facility];  Service: Urology;  Laterality: Left;    URETEROSCOPIC REMOVAL OF URETERIC CALCULUS Right 2022    Procedure: REMOVAL, CALCULUS, URETER, URETEROSCOPIC;  Surgeon: Sharon Zelaya MD;  [facility];  Service: Urology;  Laterality: Right;    URETEROSCOPIC REMOVAL OF URETERIC CALCULUS Left 2023    Procedure: REMOVAL, CALCULUS, URETER, URETEROSCOPIC;  Surgeon: Sharon Zelaya MD;  [facility];  Service: Urology;  Laterality: Left;    URETEROSCOPY Right 2022    Procedure: URETEROSCOPY;  Surgeon: Sharon Zelaya MD;  [facility];  Service: Urology;  Laterality: Right;    URETEROSCOPY Left 2023    Procedure: URETEROSCOPY;  Surgeon: Sharon Zelaya MD;  [facility];  Service: Urology;  Laterality: Left;    URETEROSCOPY Left 2023    Procedure: URETEROSCOPY;  Surgeon: Sharon Zelaya MD;  Location: [facility] OR [address] [place] [address];  Service: Urology;  Laterality: Left;       Family History   Problem Relation Name Age of Onset    Cancer Mother Mom 58        breast cancer    Breast cancer Mother Mom     Aortic aneurysm Father      Diabetes Paternal Uncle      Hypertension Maternal Grandmother      Diabetes Maternal Grandfather [place]     Heart disease Neg Hx      Stroke Neg Hx      Anesthesia problems Neg Hx         Social History[2]    OB History    Para Term  AB Living   0 0 0 0 0 0   SAB IAB Ectopic Multiple Live Births   0 0 0 0    Obstetric Comments   Menarche- 10       No LMP recorded. Patient has had an implant.       Review of  "Systems  GENERAL: Denies unintentional weight gain or weight loss. Feeling well overall.   SKIN: Denies rash or lesions.   HEENT: Denies headaches, or vision changes.   CARDIOVASCULAR: Denies palpitations or chest pain.   RESPIRATORY: Denies shortness of breath or dyspnea on exertion.  BREASTS: Denies pain, lumps, or nipple discharge.   ABDOMEN: Denies abdominal pain, constipation, diarrhea, nausea, vomiting, change in appetite.  URINARY: Denies frequency, dysuria, hematuria.  NEUROLOGIC: Denies syncope or weakness.   PSYCHIATRIC: Denies depression, anxiety or mood swings.     Objective:     /78   Ht 5' (1.524 m)   Wt 61.1 kg (134 lb 11.2 oz)   BMI 26.31 kg/m²     Body mass index is 26.31 kg/m².    APPEARANCE: Well nourished, well developed, in no acute distress.  PSYCH: Appropriate mood and affect.  SKIN: No acne or hirsutism  NECK: Neck symmetric without masses or thyromegaly  NODES: No inguinal, axillary, or supraclavicular lymph node enlargement  CHEST: Normal respiratory effort,  ABDOMEN: Soft.  No tenderness or masses.    EXTREMITIES: No edema       Last Pap: NILM -HPV 10/7/2024    Assessment/ Plan:     1. Encounter for sterilization        2. Heterozygous for prothrombin [id number] mutation            1. To OR for  Laparoscopic bilateral salpingectomy   on 4/10/25  2. Consents reviewed and signed      Counseling     With the patient I had a full discussion of the risks, benefits, and alternatives of all procedures to be performed, including but not limited to injury to other organs, failure to resolve problem, recurrence of disease state, infection, need for open procedure. We discussed the risks, benefits, and alternatives to blood transfusion as well.   She agrees with the plan of care; therefore, we will proceed with the surgery as scheduled.     Finesse consult to on-call Heme Dr Jiménez regarding gerber-operative management:  Pt in "moderate risk" for VTE given heterozygous prothrombin mutation in " setting of prior VTE. Recommendation is post-operative prophylaxis lovenox daily for 7 days. Discussed with patient and she voiced understanding, will send into pharmacy.       [1]   Patient Active Problem List  Diagnosis    Cerebral venous thrombosis    Headache    Dural sinus thrombosis    Cephalalgia    Migraine aura, persistent, intractable, with status migrainosus    Mild episode of recurrent major depressive disorder    Abnormal albumin    Cerebral venous sinus thrombosis    Heterozygous for prothrombin [id number] mutation    Vitamin D insufficiency    Nephrolithiasis   [2]   Social History  Socioeconomic History    Marital status: Single   Tobacco Use    Smoking status: Never    Smokeless tobacco: Never   Substance and Sexual Activity    Alcohol use: Not Currently     Comment: No longer drinking ETOH    Drug use: No    Sexual activity: Not Currently     Partners: Male     Birth control/protection: Abstinence, I.U.D.     Social Drivers of Health     Financial Resource Strain: Low Risk  (2/20/2024)    Overall Financial Resource Strain (CARDIA)     Difficulty of Paying Living Expenses: Not very hard   Food Insecurity: No Food Insecurity (2/20/2024)    Hunger Vital Sign     Worried About Running Out of Food in the Last Year: Never true     Ran Out of Food in the Last Year: Never true   Transportation Needs: No Transportation Needs (2/20/2024)    PRAPARE - Transportation     Lack of Transportation (Medical): No     Lack of Transportation (Non-Medical): No   Physical Activity: Insufficiently Active (2/20/2024)    Exercise Vital Sign     Days of Exercise per Week: 2 days     Minutes of Exercise per Session: 30 min   Stress: Stress Concern Present (2/20/2024)    [employer] [employer] - Occupational Stress Questionnaire     Feeling of Stress : To some extent   Housing Stability: Low Risk  (2/20/2024)    Housing Stability Vital Sign     Unable to Pay for Housing in the Last Year: No     Number of Places  Lived in the Last Year: 1     Unstable Housing in the Last Year: No

## 2025-04-10 NOTE — ANESTHESIA PROCEDURE NOTES
Intubation    Date/Time: 4/10/2025 7:12 AM    Performed by: Jhon Ogden  Authorized by: Codey Woodruff,     Intubation:     Induction:  Intravenous    Intubated:  Postinduction    Mask Ventilation:  Easy mask    Attempts:  1    Attempted By:  Student (Jhon Ogden, [facility])    Method of Intubation:  Video laryngoscopy    Blade:  Montalvo 3    Laryngeal View Grade: Grade I - full view of cords      Difficult Airway Encountered?: No      Complications:  None    Airway Device:  Oral endotracheal tube    Airway Device Size:  7.0    Style/Cuff Inflation:  Cuffed (inflated to minimal occlusive pressure)    Inflation Amount (mL):  8    Tube secured:  21    Secured at:  The lips    Placement Verified By:  Capnometry, Colorimetric ETCO2 device and Revisualization with laryngoscopy    Complicating Factors:  None    Findings Post-Intubation:  BS equal bilateral and atraumatic/condition of teeth unchanged

## 2025-04-10 NOTE — DISCHARGE SUMMARY
[facility]  [facility]charge Summary  [facility]    Admission date: 4/10/2025  Discharge date: 04/10/2025    Admit Dx:    Problem List[1]  Discharge Dx:  Problem List[2]    Attending Physician: Gricelda Dover   Discharge Provider: Gricelda Dover    Procedures Performed: Procedure(s) (LRB):  SALPINGECTOMY, LAPAROSCOPIC (Bilateral)    Hospital Course: Patient was admitted on 4/10/2025 to undergo laparoscopic bilateral salpingectomy secondary to requested sterilization.  Procedure was uncomplicated, for full details see operative report. Barring any unforseen incidents, patient will be discharged home when she is able to ambulate, void, and tolerate PO intake.    Consults: none    Significant Diagnostic Studies:   Lab Results   Component Value Date    WBC 8.51 03/27/2025    HGB 13.6 03/27/2025    HCT 41.0 03/27/2025    MCV 91 03/27/2025     03/27/2025     Discharged Condition: stable    Disposition: Home    Follow Up:   Future Appointments   Date Time Provider Department Center   4/23/2025  3:00 PM [facility]         Medications:  Current Discharge Medication List        START taking these medications    Details   ondansetron (ZOFRAN-ODT) 4 MG TbDL Take 1 tablet (4 mg total) by mouth every 6 to 8 hours as needed (nausea).  Qty: 30 tablet, Refills: 0      oxyCODONE-acetaminophen (PERCOCET) 5-325 mg per tablet Take 1 tablet by mouth every 4 (four) hours as needed for Pain.  Qty: 10 tablet, Refills: 0    Comments: Quantity prescribed more than 7 day supply? No  Associated Diagnoses: Status post laparoscopic surgery           CONTINUE these medications which have NOT CHANGED    Details   busPIRone (BUSPAR) 15 MG tablet Take 15 mg by mouth 3 (three) times daily.      sertraline (ZOLOFT) 100 MG tablet Take 100 mg by mouth 2 (two) times daily.      enoxaparin (LOVENOX) 40 mg/0.4 mL Syrg Inject 0.4 mLs (40 mg total) into the skin once daily. 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      levonorgestreL (LILETTA) 20.4 mcg/24 hrs (8 yrs) 52 mg IUD 1 Intra Uterine Device by Intrauterine route once.             Discharge Procedure Orders   Diet Adult Regular     Pelvic Rest   Order Comments: No intercourse or tampons for 2 weeks     Lifting restrictions   Order Comments: Nothing over 20 lbs for 2 weeks     No driving until:   Order Comments: Do not drive until no longer taking narcotic medications and feel comfortable pressing on the brakes     Notify your health care provider if you experience any of the following:  temperature >100.4     Notify your health care provider if you experience any of the following:  persistent nausea and vomiting or diarrhea     Notify your health care provider if you experience any of the following:  severe uncontrolled pain     Notify your health care provider if you experience any of the following:  difficulty breathing or increased cough     Notify your health care provider if you experience any of the following:  severe persistent headache     Notify your health care provider if you experience any of the following:  worsening rash     Notify your health care provider if you experience any of the following:  persistent dizziness, light-headedness, or visual disturbances     Notify your health care provider if you experience any of the following:  increased confusion or weakness     Notify your health care provider if you experience any of the following:   Order Comments: Vaginal bleeding more than 1 pad in 1 hour     Notify your health care provider if you experience any of the following:  redness, tenderness, or signs of infection (pain, swelling, redness, odor or green/yellow discharge around incision site)          [1]   Patient Active Problem List  Diagnosis    Cerebral venous  thrombosis    Headache    Dural sinus thrombosis    Cephalalgia    Migraine aura, persistent, intractable, with status migrainosus    Mild episode of recurrent major depressive disorder    Abnormal albumin    Cerebral venous sinus thrombosis    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

## 2025-04-10 NOTE — OPERATIVE NOTE ADDENDUM
Certification of Assistant at Surgery       Surgery Date: 4/10/2025     Participating Surgeons:  Surgeons and Role:     * Gricelda Dover MD - Primary     * Bunny Wild MD - Assisting    Procedures:  Procedure(s) (LRB):  SALPINGECTOMY, LAPAROSCOPIC (Bilateral)    Assistant Surgeon's Certification of Necessity:  I understand that section 1842 (b) (6) (d) of the Social Security Act generally prohibits Medicare Part B reasonable charge payment for the services of assistants at surgery in teaching hospitals when qualified residents are available to furnish such services. I certify that the services for which payment is claimed were medically necessary, and that no qualified resident was available to perform the services. I further understand that these services are subject to post-payment review by the Medicare carrier.      Bunny Wild MD    04/10/2025  8:33 AM

## 2025-04-10 NOTE — TRANSFER OF CARE
Anesthesia Transfer of Care Note    Patient: Rosa Limon    Procedure(s) Performed: Procedure(s) (LRB):  SALPINGECTOMY, LAPAROSCOPIC (Bilateral)    Patient location: PACU    Anesthesia Type: general    Transport from OR: Transported from OR on 6-10 L/min O2 by face mask with adequate spontaneous ventilation    Post pain: adequate analgesia    Post assessment: no apparent anesthetic complications and tolerated procedure well    Post vital signs: stable    Level of consciousness: alert and awake    Nausea/Vomiting: no nausea/vomiting    Complications: none    Transfer of care protocol was followed      Last vitals: Visit Vitals  /65 (BP Location: Right arm, Patient Position: Lying)   Pulse 80   Temp 36.6 °C (97.9 °F) (Temporal)   Resp 18   Wt 58.1 kg (128 lb)   SpO2 100%   Breastfeeding No   BMI 25.00 kg/m²

## 2025-04-10 NOTE — PLAN OF CARE
Pt in preop bay 42, VSS, meds given and IV inserted. Pt denies any open wounds on body or the use of any weight loss injections. Pt needs anesthesia consents and an updated H&P, otherwise ready to roll.  Procedural consents verified with pt.

## 2025-04-11 ENCOUNTER — TELEPHONE (OUTPATIENT)
Dept: OBSTETRICS AND GYNECOLOGY | Facility: CLINIC | Age: 35
End: 2025-04-11
Payer: COMMERCIAL

## 2025-04-11 NOTE — TELEPHONE ENCOUNTER
Called to check in after surgery yesterday. Feeling well overall. Appropriate soreness. No concerns or questions. Will see for postop visit.

## 2025-04-14 ENCOUNTER — PATIENT MESSAGE (OUTPATIENT)
Dept: OBSTETRICS AND GYNECOLOGY | Facility: CLINIC | Age: 35
End: 2025-04-14
Payer: COMMERCIAL

## 2025-04-14 ENCOUNTER — RESULTS FOLLOW-UP (OUTPATIENT)
Dept: OBSTETRICS AND GYNECOLOGY | Facility: CLINIC | Age: 35
End: 2025-04-14

## 2025-04-14 LAB
ESTROGEN SERPL-MCNC: NORMAL PG/ML
INSULIN SERPL-ACNC: NORMAL U[IU]/ML
LAB AP CLINICAL INFORMATION: NORMAL
LAB AP DIAGNOSIS CATEGORY: NORMAL
LAB AP GROSS DESCRIPTION: NORMAL
LAB AP PERFORMING LOCATION(S): NORMAL
LAB AP REPORT FOOTNOTES: NORMAL

## 2025-04-23 ENCOUNTER — OFFICE VISIT (OUTPATIENT)
Dept: OBSTETRICS AND GYNECOLOGY | Facility: CLINIC | Age: 35
End: 2025-04-23
Payer: COMMERCIAL

## 2025-04-23 VITALS
DIASTOLIC BLOOD PRESSURE: 70 MMHG | HEIGHT: 60 IN | SYSTOLIC BLOOD PRESSURE: 100 MMHG | WEIGHT: 132.25 LBS | BODY MASS INDEX: 25.97 KG/M2

## 2025-04-23 DIAGNOSIS — Z48.89 POSTOPERATIVE VISIT: Primary | ICD-10-CM

## 2025-04-23 PROCEDURE — 99999 PR PBB SHADOW E&M-EST. PATIENT-LVL III: CPT | Mod: PBBFAC,,, | Performed by: STUDENT IN AN ORGANIZED HEALTH CARE EDUCATION/TRAINING PROGRAM

## 2025-04-23 PROCEDURE — 1159F MED LIST DOCD IN RCRD: CPT | Mod: CPTII,S$GLB,, | Performed by: STUDENT IN AN ORGANIZED HEALTH CARE EDUCATION/TRAINING PROGRAM

## 2025-04-23 PROCEDURE — 1160F RVW MEDS BY RX/DR IN RCRD: CPT | Mod: CPTII,S$GLB,, | Performed by: STUDENT IN AN ORGANIZED HEALTH CARE EDUCATION/TRAINING PROGRAM

## 2025-04-23 PROCEDURE — 99024 POSTOP FOLLOW-UP VISIT: CPT | Mod: S$GLB,,, | Performed by: STUDENT IN AN ORGANIZED HEALTH CARE EDUCATION/TRAINING PROGRAM

## 2025-04-23 PROCEDURE — 3078F DIAST BP <80 MM HG: CPT | Mod: CPTII,S$GLB,, | Performed by: STUDENT IN AN ORGANIZED HEALTH CARE EDUCATION/TRAINING PROGRAM

## 2025-04-23 PROCEDURE — 3044F HG A1C LEVEL LT 7.0%: CPT | Mod: CPTII,S$GLB,, | Performed by: STUDENT IN AN ORGANIZED HEALTH CARE EDUCATION/TRAINING PROGRAM

## 2025-04-23 PROCEDURE — 3074F SYST BP LT 130 MM HG: CPT | Mod: CPTII,S$GLB,, | Performed by: STUDENT IN AN ORGANIZED HEALTH CARE EDUCATION/TRAINING PROGRAM

## 2025-04-23 NOTE — PROGRESS NOTES
CC: Postop visit    Subjective:      Rosa Liomn is a 35 y.o.  who presents to the clinic 2 weeks status post laparoscopic bilateral salpingectomy for requested sterilization. The patient is not having any pain. She describes her vaginal bleeding as none. IUD remains in place for cycle control.     Objective:     /70 (BP Location: Left arm, Patient Position: Sitting)   Ht 5' (1.524 m)   Wt 60 kg (132 lb 4.4 oz)   LMP  (LMP Unknown)   BMI 25.83 kg/m²   General:  alert, well appearing, and in no distress   Abdomen: Soft, non-tender, and well healed laparoscopy sites     Pathology:  1. Unremarkable right fallopian tube   2. Unremarkable left fallopian tube     Assessment:     35 y.o.  s/p laparoscopic bilateral salpingectomy 2 weeks ago  - doing well    Plan:     1. Continue any current medications.  2. Wound care discussed.  3. Activity restrictions: none  4. Anticipated return to work: not applicable.  5. Follow up:for annual exam in 1 yr.  Primary GYN Dr Nino.

## (undated) DEVICE — PACK CYSTO

## (undated) DEVICE — GOWN SMARTGOWN LVL4 X-LONG XL

## (undated) DEVICE — PACK LAPAROSCOPY BAPTIST

## (undated) DEVICE — SOL NACL 0.9% IV INJ 1000ML

## (undated) DEVICE — EXTRACTOR TIPLESS 2.4FRX1115CM

## (undated) DEVICE — SYR 10CC LUER LOCK

## (undated) DEVICE — SET IRR URLGY 2LINE UNIV SPIKE

## (undated) DEVICE — APPLICATOR CHLORAPREP ORN 26ML

## (undated) DEVICE — PAD PERI POST REPLACEMNT

## (undated) DEVICE — SOL IRR NACL .9% 3000ML

## (undated) DEVICE — SEAL LENS SCOPE MYOSURE

## (undated) DEVICE — NDL INSUF ULTRA VERESS 120MM

## (undated) DEVICE — SOL 9P NACL IRR PIC IL

## (undated) DEVICE — PAD PINK TRENDELENBURG POS XL

## (undated) DEVICE — TUBING HF INSUFFLATION W/ FLTR

## (undated) DEVICE — SUT MCRYL PLUS 4-0 PS2 27IN

## (undated) DEVICE — TRAY CYSTO BASIN OMC

## (undated) DEVICE — ELECTRODE PENCIL W/ROCKER NDL

## (undated) DEVICE — SET BASIN 48X48IN 6000ML RING

## (undated) DEVICE — ADAPTER HOSE 10FT 8MM

## (undated) DEVICE — PACK FLUENT DISPOSABLE

## (undated) DEVICE — Device

## (undated) DEVICE — CANNULA ENDOPATH XCEL 5X100MM

## (undated) DEVICE — SYS SEE SHARP SCP ANTIFG LNG

## (undated) DEVICE — SOL IRR WATER STRL 3000 ML

## (undated) DEVICE — GLOVE BIOGEL SKINSENSE PI 6.0

## (undated) DEVICE — COVER LIGHT HANDLE 80/CA

## (undated) DEVICE — GUIDE WIRE MOTION .035 X 150CM

## (undated) DEVICE — SUT VICRYL+ 27 UR-6 VIOL

## (undated) DEVICE — TROCAR ENDOPATH XCEL 5X100MM

## (undated) DEVICE — SET CYSTO IRRIGATION UNIV SPIK

## (undated) DEVICE — UNDERGLOVES BIOGEL PI SZ 6 LF

## (undated) DEVICE — SCISSOR 5MMX35CM DIRECT DRIVE

## (undated) DEVICE — CATH POLLACK OPEN-END FLEXI-TI

## (undated) DEVICE — GUIDEWIRE STR TIP HIWIRE 150CM

## (undated) DEVICE — GLOVE BIOGEL SKINSENSE PI 6.5

## (undated) DEVICE — TRAY CYSTO BASIN

## (undated) DEVICE — ADHESIVE DERMABOND ADVANCED

## (undated) DEVICE — ELECTRODE REM PLYHSV RETURN 9

## (undated) DEVICE — SOL NACL IRR 1000ML BTL